# Patient Record
Sex: FEMALE | Race: WHITE | NOT HISPANIC OR LATINO | Employment: OTHER | URBAN - METROPOLITAN AREA
[De-identification: names, ages, dates, MRNs, and addresses within clinical notes are randomized per-mention and may not be internally consistent; named-entity substitution may affect disease eponyms.]

---

## 2018-05-14 ENCOUNTER — CONVERSION ENCOUNTER (OUTPATIENT)
Dept: MAMMOGRAPHY | Facility: HOSPITAL | Age: 68
End: 2018-05-14

## 2018-06-27 ENCOUNTER — OFFICE VISIT CONVERTED (OUTPATIENT)
Dept: GASTROENTEROLOGY | Facility: CLINIC | Age: 68
End: 2018-06-27
Attending: NURSE PRACTITIONER

## 2019-05-16 ENCOUNTER — OFFICE VISIT CONVERTED (OUTPATIENT)
Dept: ORTHOPEDIC SURGERY | Facility: CLINIC | Age: 69
End: 2019-05-16
Attending: ORTHOPAEDIC SURGERY

## 2019-06-18 ENCOUNTER — OFFICE VISIT CONVERTED (OUTPATIENT)
Dept: ORTHOPEDIC SURGERY | Facility: CLINIC | Age: 69
End: 2019-06-18
Attending: ORTHOPAEDIC SURGERY

## 2019-08-05 ENCOUNTER — HOSPITAL ENCOUNTER (OUTPATIENT)
Dept: PREADMISSION TESTING | Facility: HOSPITAL | Age: 69
Discharge: HOME | End: 2019-08-05
Attending: ORTHOPAEDIC SURGERY

## 2019-08-05 LAB
ALBUMIN SERPL-MCNC: 4.3 G/DL (ref 3.5–5)
ALBUMIN/GLOB SERPL: 1.5 {RATIO} (ref 1.4–2.6)
ALP SERPL-CCNC: 75 U/L (ref 43–160)
ALT SERPL-CCNC: 10 U/L (ref 10–40)
ANION GAP SERPL CALC-SCNC: 15 MMOL/L (ref 8–19)
APTT BLD: 23.4 S (ref 22.2–34.2)
AST SERPL-CCNC: 16 U/L (ref 15–50)
BASOPHILS # BLD AUTO: 0.07 10*3/UL (ref 0–0.2)
BASOPHILS NFR BLD AUTO: 0.9 % (ref 0–3)
BILIRUB SERPL-MCNC: 0.41 MG/DL (ref 0.2–1.3)
BUN SERPL-MCNC: 14 MG/DL (ref 5–25)
BUN/CREAT SERPL: 19 {RATIO} (ref 6–20)
CALCIUM SERPL-MCNC: 9.3 MG/DL (ref 8.7–10.4)
CHLORIDE SERPL-SCNC: 101 MMOL/L (ref 99–111)
CONV ABS IMM GRAN: 0.02 10*3/UL (ref 0–0.2)
CONV CO2: 28 MMOL/L (ref 22–32)
CONV IMMATURE GRAN: 0.3 % (ref 0–1.8)
CONV TOTAL PROTEIN: 7.1 G/DL (ref 6.3–8.2)
CREAT UR-MCNC: 0.73 MG/DL (ref 0.5–0.9)
DEPRECATED RDW RBC AUTO: 44.8 FL (ref 36.4–46.3)
EOSINOPHIL # BLD AUTO: 0.67 10*3/UL (ref 0–0.7)
EOSINOPHIL # BLD AUTO: 8.9 % (ref 0–7)
ERYTHROCYTE [DISTWIDTH] IN BLOOD BY AUTOMATED COUNT: 13.2 % (ref 11.7–14.4)
EST. AVERAGE GLUCOSE BLD GHB EST-MCNC: 103 MG/DL
GFR SERPLBLD BASED ON 1.73 SQ M-ARVRAT: >60 ML/MIN/{1.73_M2}
GLOBULIN UR ELPH-MCNC: 2.8 G/DL (ref 2–3.5)
GLUCOSE SERPL-MCNC: 115 MG/DL (ref 65–99)
HBA1C MFR BLD: 5.2 % (ref 3.5–5.7)
HCT VFR BLD AUTO: 38.5 % (ref 37–47)
HGB BLD-MCNC: 12.3 G/DL (ref 12–16)
INR PPP: 0.9 (ref 2–3)
LYMPHOCYTES # BLD AUTO: 1.42 10*3/UL (ref 1–5)
LYMPHOCYTES NFR BLD AUTO: 18.8 % (ref 20–45)
MCH RBC QN AUTO: 29.6 PG (ref 27–31)
MCHC RBC AUTO-ENTMCNC: 31.9 G/DL (ref 33–37)
MCV RBC AUTO: 92.5 FL (ref 81–99)
MONOCYTES # BLD AUTO: 0.73 10*3/UL (ref 0.2–1.2)
MONOCYTES NFR BLD AUTO: 9.7 % (ref 3–10)
NEUTROPHILS # BLD AUTO: 4.63 10*3/UL (ref 2–8)
NEUTROPHILS NFR BLD AUTO: 61.4 % (ref 30–85)
NRBC CBCN: 0 % (ref 0–0.7)
OSMOLALITY SERPL CALC.SUM OF ELEC: 291 MOSM/KG (ref 273–304)
PLATELET # BLD AUTO: 288 10*3/UL (ref 130–400)
PMV BLD AUTO: 9.5 FL (ref 9.4–12.3)
POTASSIUM SERPL-SCNC: 4.4 MMOL/L (ref 3.5–5.3)
PROTHROMBIN TIME: 9.6 S (ref 9.4–12)
RBC # BLD AUTO: 4.16 10*6/UL (ref 4.2–5.4)
SODIUM SERPL-SCNC: 140 MMOL/L (ref 135–147)
WBC # BLD AUTO: 7.54 10*3/UL (ref 4.8–10.8)

## 2019-09-24 ENCOUNTER — HOSPITAL ENCOUNTER (OUTPATIENT)
Dept: OTHER | Facility: HOSPITAL | Age: 69
Discharge: HOME | End: 2019-09-24
Attending: INTERNAL MEDICINE

## 2019-09-24 LAB
ALBUMIN SERPL-MCNC: 4 G/DL (ref 3.5–5)
ALBUMIN/GLOB SERPL: 1.4 {RATIO} (ref 1.4–2.6)
ALP SERPL-CCNC: 89 U/L (ref 43–160)
ALT SERPL-CCNC: 16 U/L (ref 10–40)
ANION GAP SERPL CALC-SCNC: 13 MMOL/L (ref 8–19)
AST SERPL-CCNC: 22 U/L (ref 15–50)
BASOPHILS # BLD AUTO: 0.06 10*3/UL (ref 0–0.2)
BASOPHILS NFR BLD AUTO: 1 % (ref 0–3)
BILIRUB SERPL-MCNC: 0.23 MG/DL (ref 0.2–1.3)
BUN SERPL-MCNC: 12 MG/DL (ref 5–25)
BUN/CREAT SERPL: 20 {RATIO} (ref 6–20)
CALCIUM SERPL-MCNC: 9.6 MG/DL (ref 8.7–10.4)
CHLORIDE SERPL-SCNC: 101 MMOL/L (ref 99–111)
CONV ABS IMM GRAN: 0.02 10*3/UL (ref 0–0.2)
CONV CO2: 27 MMOL/L (ref 22–32)
CONV IMMATURE GRAN: 0.3 % (ref 0–1.8)
CONV TOTAL PROTEIN: 6.8 G/DL (ref 6.3–8.2)
CREAT UR-MCNC: 0.61 MG/DL (ref 0.5–0.9)
DEPRECATED RDW RBC AUTO: 45.4 FL (ref 36.4–46.3)
EOSINOPHIL # BLD AUTO: 0.42 10*3/UL (ref 0–0.7)
EOSINOPHIL # BLD AUTO: 7.3 % (ref 0–7)
ERYTHROCYTE [DISTWIDTH] IN BLOOD BY AUTOMATED COUNT: 13.2 % (ref 11.7–14.4)
GFR SERPLBLD BASED ON 1.73 SQ M-ARVRAT: >60 ML/MIN/{1.73_M2}
GLOBULIN UR ELPH-MCNC: 2.8 G/DL (ref 2–3.5)
GLUCOSE SERPL-MCNC: 105 MG/DL (ref 65–99)
HCT VFR BLD AUTO: 34.5 % (ref 37–47)
HGB BLD-MCNC: 10.8 G/DL (ref 12–16)
LYMPHOCYTES # BLD AUTO: 1.03 10*3/UL (ref 1–5)
LYMPHOCYTES NFR BLD AUTO: 17.9 % (ref 20–45)
MCH RBC QN AUTO: 29.4 PG (ref 27–31)
MCHC RBC AUTO-ENTMCNC: 31.3 G/DL (ref 33–37)
MCV RBC AUTO: 94 FL (ref 81–99)
MONOCYTES # BLD AUTO: 0.62 10*3/UL (ref 0.2–1.2)
MONOCYTES NFR BLD AUTO: 10.7 % (ref 3–10)
NEUTROPHILS # BLD AUTO: 3.62 10*3/UL (ref 2–8)
NEUTROPHILS NFR BLD AUTO: 62.8 % (ref 30–85)
NRBC CBCN: 0 % (ref 0–0.7)
OSMOLALITY SERPL CALC.SUM OF ELEC: 284 MOSM/KG (ref 273–304)
PLATELET # BLD AUTO: 384 10*3/UL (ref 130–400)
PMV BLD AUTO: 9.7 FL (ref 9.4–12.3)
POTASSIUM SERPL-SCNC: 4.4 MMOL/L (ref 3.5–5.3)
RBC # BLD AUTO: 3.67 10*6/UL (ref 4.2–5.4)
SODIUM SERPL-SCNC: 137 MMOL/L (ref 135–147)
WBC # BLD AUTO: 5.77 10*3/UL (ref 4.8–10.8)

## 2019-09-26 ENCOUNTER — OFFICE VISIT CONVERTED (OUTPATIENT)
Dept: ORTHOPEDIC SURGERY | Facility: CLINIC | Age: 69
End: 2019-09-26

## 2019-10-24 ENCOUNTER — OFFICE VISIT CONVERTED (OUTPATIENT)
Dept: ORTHOPEDIC SURGERY | Facility: CLINIC | Age: 69
End: 2019-10-24

## 2019-11-21 ENCOUNTER — OFFICE VISIT CONVERTED (OUTPATIENT)
Dept: ORTHOPEDIC SURGERY | Facility: CLINIC | Age: 69
End: 2019-11-21
Attending: PHYSICIAN ASSISTANT

## 2020-01-07 ENCOUNTER — OFFICE VISIT CONVERTED (OUTPATIENT)
Dept: ORTHOPEDIC SURGERY | Facility: CLINIC | Age: 70
End: 2020-01-07
Attending: PHYSICIAN ASSISTANT

## 2020-02-18 ENCOUNTER — OFFICE VISIT CONVERTED (OUTPATIENT)
Dept: ORTHOPEDIC SURGERY | Facility: CLINIC | Age: 70
End: 2020-02-18
Attending: PHYSICIAN ASSISTANT

## 2020-11-21 ENCOUNTER — HOSPITAL ENCOUNTER (OUTPATIENT)
Dept: MAMMOGRAPHY | Facility: HOSPITAL | Age: 70
Discharge: HOME | End: 2020-11-21
Attending: INTERNAL MEDICINE

## 2021-01-25 ENCOUNTER — HOSPITAL ENCOUNTER (OUTPATIENT)
Dept: MAMMOGRAPHY | Facility: HOSPITAL | Age: 71
Discharge: HOME | End: 2021-01-25
Attending: INTERNAL MEDICINE

## 2021-05-11 ENCOUNTER — HOSPITAL ENCOUNTER (OUTPATIENT)
Dept: MRI IMAGING | Facility: HOSPITAL | Age: 71
Discharge: HOME | End: 2021-05-11
Attending: INTERNAL MEDICINE

## 2021-05-11 LAB
CREAT BLD-MCNC: 0.5 MG/DL (ref 0.6–1.4)
GFR SERPLBLD BASED ON 1.73 SQ M-ARVRAT: >60 ML/MIN/{1.73_M2}

## 2021-05-15 VITALS — HEIGHT: 63 IN | HEART RATE: 105 BPM | OXYGEN SATURATION: 94 %

## 2021-05-15 VITALS — OXYGEN SATURATION: 97 % | WEIGHT: 255 LBS | BODY MASS INDEX: 45.18 KG/M2 | HEIGHT: 63 IN | HEART RATE: 81 BPM

## 2021-05-15 VITALS — WEIGHT: 267 LBS | BODY MASS INDEX: 42.91 KG/M2 | OXYGEN SATURATION: 99 % | HEART RATE: 90 BPM | HEIGHT: 66 IN

## 2021-05-15 VITALS — WEIGHT: 255 LBS | OXYGEN SATURATION: 98 % | HEIGHT: 63 IN | BODY MASS INDEX: 45.18 KG/M2 | HEART RATE: 82 BPM

## 2021-05-15 VITALS — HEART RATE: 90 BPM | WEIGHT: 266 LBS | BODY MASS INDEX: 47.13 KG/M2 | HEIGHT: 63 IN | OXYGEN SATURATION: 97 %

## 2021-05-15 VITALS — HEIGHT: 63 IN | WEIGHT: 255 LBS | HEART RATE: 104 BPM | BODY MASS INDEX: 45.18 KG/M2 | OXYGEN SATURATION: 98 %

## 2021-05-15 VITALS — HEART RATE: 96 BPM | HEIGHT: 66 IN | OXYGEN SATURATION: 92 % | BODY MASS INDEX: 42.77 KG/M2 | WEIGHT: 266.12 LBS

## 2021-05-16 VITALS
WEIGHT: 269 LBS | HEART RATE: 103 BPM | TEMPERATURE: 97.8 F | SYSTOLIC BLOOD PRESSURE: 145 MMHG | HEIGHT: 66 IN | DIASTOLIC BLOOD PRESSURE: 75 MMHG | BODY MASS INDEX: 43.23 KG/M2

## 2022-03-15 ENCOUNTER — TRANSCRIBE ORDERS (OUTPATIENT)
Dept: ADMINISTRATIVE | Facility: HOSPITAL | Age: 72
End: 2022-03-15

## 2022-03-15 DIAGNOSIS — Z12.31 VISIT FOR SCREENING MAMMOGRAM: Primary | ICD-10-CM

## 2022-03-22 ENCOUNTER — APPOINTMENT (OUTPATIENT)
Dept: MAMMOGRAPHY | Facility: HOSPITAL | Age: 72
End: 2022-03-22

## 2022-11-01 ENCOUNTER — APPOINTMENT (OUTPATIENT)
Dept: MAMMOGRAPHY | Facility: HOSPITAL | Age: 72
End: 2022-11-01

## 2022-12-22 ENCOUNTER — APPOINTMENT (OUTPATIENT)
Dept: GENERAL RADIOLOGY | Facility: HOSPITAL | Age: 72
DRG: 184 | End: 2022-12-22
Payer: MEDICARE

## 2022-12-22 ENCOUNTER — HOSPITAL ENCOUNTER (INPATIENT)
Facility: HOSPITAL | Age: 72
LOS: 2 days | Discharge: NURSING, REHAB OR LONG-TERM CARE | DRG: 184 | End: 2022-12-27
Attending: EMERGENCY MEDICINE | Admitting: INTERNAL MEDICINE
Payer: MEDICARE

## 2022-12-22 ENCOUNTER — APPOINTMENT (OUTPATIENT)
Dept: CT IMAGING | Facility: HOSPITAL | Age: 72
DRG: 184 | End: 2022-12-22
Payer: MEDICARE

## 2022-12-22 ENCOUNTER — APPOINTMENT (OUTPATIENT)
Dept: GENERAL RADIOLOGY | Facility: HOSPITAL | Age: 72
End: 2022-12-22

## 2022-12-22 ENCOUNTER — HOSPITAL ENCOUNTER (EMERGENCY)
Facility: HOSPITAL | Age: 72
Discharge: HOME | End: 2022-12-22
Attending: EMERGENCY MEDICINE | Admitting: EMERGENCY MEDICINE

## 2022-12-22 ENCOUNTER — APPOINTMENT (OUTPATIENT)
Dept: CT IMAGING | Facility: HOSPITAL | Age: 72
End: 2022-12-22

## 2022-12-22 VITALS
RESPIRATION RATE: 24 BRPM | TEMPERATURE: 98.6 F | HEIGHT: 65 IN | DIASTOLIC BLOOD PRESSURE: 79 MMHG | OXYGEN SATURATION: 95 % | WEIGHT: 293 LBS | SYSTOLIC BLOOD PRESSURE: 166 MMHG | HEART RATE: 107 BPM | BODY MASS INDEX: 48.82 KG/M2

## 2022-12-22 DIAGNOSIS — S22.39XA CLOSED FRACTURE OF ONE RIB, UNSPECIFIED LATERALITY, INITIAL ENCOUNTER: ICD-10-CM

## 2022-12-22 DIAGNOSIS — S62.101A WRIST FRACTURE, CLOSED, RIGHT, INITIAL ENCOUNTER: ICD-10-CM

## 2022-12-22 DIAGNOSIS — Z78.9 DECREASED ACTIVITIES OF DAILY LIVING (ADL): ICD-10-CM

## 2022-12-22 DIAGNOSIS — S22.41XD CLOSED FRACTURE OF MULTIPLE RIBS OF RIGHT SIDE WITH ROUTINE HEALING, SUBSEQUENT ENCOUNTER: Primary | ICD-10-CM

## 2022-12-22 DIAGNOSIS — R26.2 DIFFICULTY WALKING: ICD-10-CM
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
## 2022-12-22 PROBLEM — R52 INTRACTABLE PAIN: Status: ACTIVE | Noted: 2022-12-22

## 2022-12-22 LAB
ALBUMIN SERPL-MCNC: 3.8 G/DL (ref 3.5–5.2)
ALBUMIN SERPL-MCNC: 3.9 G/DL (ref 3.5–5.2)
ALBUMIN/GLOB SERPL: 1.4 G/DL
ALBUMIN/GLOB SERPL: 1.4 G/DL
ALP SERPL-CCNC: 90 U/L (ref 39–117)
ALP SERPL-CCNC: 92 U/L (ref 39–117)
ALT SERPL W P-5'-P-CCNC: 11 U/L (ref 1–33)
ALT SERPL W P-5'-P-CCNC: 14 U/L (ref 1–33)
ANION GAP SERPL CALCULATED.3IONS-SCNC: 12.8 MMOL/L (ref 5–15)
ANION GAP SERPL CALCULATED.3IONS-SCNC: 13.7 MMOL/L (ref 5–15)
AST SERPL-CCNC: 20 U/L (ref 1–32)
AST SERPL-CCNC: 35 U/L (ref 1–32)
BACTERIA UR QL AUTO: ABNORMAL /HPF
BASOPHILS # BLD AUTO: 0.05 10*3/MM3 (ref 0–0.2)
BASOPHILS # BLD AUTO: 0.06 10*3/MM3 (ref 0–0.2)
BASOPHILS NFR BLD AUTO: 0.4 % (ref 0–1.5)
BASOPHILS NFR BLD AUTO: 0.7 % (ref 0–1.5)
BILIRUB SERPL-MCNC: 0.4 MG/DL (ref 0–1.2)
BILIRUB SERPL-MCNC: 0.7 MG/DL (ref 0–1.2)
BILIRUB UR QL STRIP: NEGATIVE
BUN SERPL-MCNC: 13 MG/DL (ref 8–23)
BUN SERPL-MCNC: 16 MG/DL (ref 8–23)
BUN/CREAT SERPL: 19.4 (ref 7–25)
BUN/CREAT SERPL: 22.5 (ref 7–25)
CALCIUM SPEC-SCNC: 9.5 MG/DL (ref 8.6–10.5)
CALCIUM SPEC-SCNC: 9.5 MG/DL (ref 8.6–10.5)
CHLORIDE SERPL-SCNC: 105 MMOL/L (ref 98–107)
CHLORIDE SERPL-SCNC: 99 MMOL/L (ref 98–107)
CLARITY UR: ABNORMAL
CO2 SERPL-SCNC: 24.3 MMOL/L (ref 22–29)
CO2 SERPL-SCNC: 26.2 MMOL/L (ref 22–29)
COD CRY URNS QL: ABNORMAL /HPF
COLOR UR: YELLOW
CREAT SERPL-MCNC: 0.67 MG/DL (ref 0.57–1)
CREAT SERPL-MCNC: 0.71 MG/DL (ref 0.57–1)
DEPRECATED RDW RBC AUTO: 45.7 FL (ref 37–54)
DEPRECATED RDW RBC AUTO: 45.8 FL (ref 37–54)
EGFRCR SERPLBLD CKD-EPI 2021: 90.5 ML/MIN/1.73
EGFRCR SERPLBLD CKD-EPI 2021: 93 ML/MIN/1.73
EOSINOPHIL # BLD AUTO: 0.02 10*3/MM3 (ref 0–0.4)
EOSINOPHIL # BLD AUTO: 0.18 10*3/MM3 (ref 0–0.4)
EOSINOPHIL NFR BLD AUTO: 0.2 % (ref 0.3–6.2)
EOSINOPHIL NFR BLD AUTO: 2.1 % (ref 0.3–6.2)
ERYTHROCYTE [DISTWIDTH] IN BLOOD BY AUTOMATED COUNT: 13.4 % (ref 12.3–15.4)
ERYTHROCYTE [DISTWIDTH] IN BLOOD BY AUTOMATED COUNT: 13.7 % (ref 12.3–15.4)
GLOBULIN UR ELPH-MCNC: 2.7 GM/DL
GLOBULIN UR ELPH-MCNC: 2.7 GM/DL
GLUCOSE SERPL-MCNC: 109 MG/DL (ref 65–99)
GLUCOSE SERPL-MCNC: 111 MG/DL (ref 65–99)
GLUCOSE UR STRIP-MCNC: NEGATIVE MG/DL
HCT VFR BLD AUTO: 35 % (ref 34–46.6)
HCT VFR BLD AUTO: 37.2 % (ref 34–46.6)
HGB BLD-MCNC: 11.2 G/DL (ref 12–15.9)
HGB BLD-MCNC: 11.7 G/DL (ref 12–15.9)
HGB UR QL STRIP.AUTO: ABNORMAL
HOLD SPECIMEN: NORMAL
HOLD SPECIMEN: NORMAL
HYALINE CASTS UR QL AUTO: ABNORMAL /LPF
IMM GRANULOCYTES # BLD AUTO: 0.04 10*3/MM3 (ref 0–0.05)
IMM GRANULOCYTES # BLD AUTO: 0.05 10*3/MM3 (ref 0–0.05)
IMM GRANULOCYTES NFR BLD AUTO: 0.4 % (ref 0–0.5)
IMM GRANULOCYTES NFR BLD AUTO: 0.5 % (ref 0–0.5)
KETONES UR QL STRIP: ABNORMAL
LEUKOCYTE ESTERASE UR QL STRIP.AUTO: ABNORMAL
LYMPHOCYTES # BLD AUTO: 0.62 10*3/MM3 (ref 0.7–3.1)
LYMPHOCYTES # BLD AUTO: 0.88 10*3/MM3 (ref 0.7–3.1)
LYMPHOCYTES NFR BLD AUTO: 10.5 % (ref 19.6–45.3)
LYMPHOCYTES NFR BLD AUTO: 4.7 % (ref 19.6–45.3)
MAGNESIUM SERPL-MCNC: 1.8 MG/DL (ref 1.6–2.4)
MCH RBC QN AUTO: 28.9 PG (ref 26.6–33)
MCH RBC QN AUTO: 29 PG (ref 26.6–33)
MCHC RBC AUTO-ENTMCNC: 31.5 G/DL (ref 31.5–35.7)
MCHC RBC AUTO-ENTMCNC: 32 G/DL (ref 31.5–35.7)
MCV RBC AUTO: 90.2 FL (ref 79–97)
MCV RBC AUTO: 92.1 FL (ref 79–97)
MONOCYTES # BLD AUTO: 0.93 10*3/MM3 (ref 0.1–0.9)
MONOCYTES # BLD AUTO: 0.97 10*3/MM3 (ref 0.1–0.9)
MONOCYTES NFR BLD AUTO: 11.5 % (ref 5–12)
MONOCYTES NFR BLD AUTO: 7 % (ref 5–12)
NEUTROPHILS NFR BLD AUTO: 11.58 10*3/MM3 (ref 1.7–7)
NEUTROPHILS NFR BLD AUTO: 6.28 10*3/MM3 (ref 1.7–7)
NEUTROPHILS NFR BLD AUTO: 74.7 % (ref 42.7–76)
NEUTROPHILS NFR BLD AUTO: 87.3 % (ref 42.7–76)
NITRITE UR QL STRIP: NEGATIVE
NRBC BLD AUTO-RTO: 0 /100 WBC (ref 0–0.2)
NRBC BLD AUTO-RTO: 0 /100 WBC (ref 0–0.2)
PH UR STRIP.AUTO: 5.5 [PH] (ref 5–8)
PLATELET # BLD AUTO: 350 10*3/MM3 (ref 140–450)
PLATELET # BLD AUTO: 356 10*3/MM3 (ref 140–450)
PMV BLD AUTO: 9 FL (ref 6–12)
PMV BLD AUTO: 9.3 FL (ref 6–12)
POTASSIUM SERPL-SCNC: 4 MMOL/L (ref 3.5–5.2)
POTASSIUM SERPL-SCNC: 4.4 MMOL/L (ref 3.5–5.2)
PROT SERPL-MCNC: 6.5 G/DL (ref 6–8.5)
PROT SERPL-MCNC: 6.6 G/DL (ref 6–8.5)
PROT UR QL STRIP: NEGATIVE
RBC # BLD AUTO: 3.88 10*6/MM3 (ref 3.77–5.28)
RBC # BLD AUTO: 4.04 10*6/MM3 (ref 3.77–5.28)
RBC # UR STRIP: ABNORMAL /HPF
REF LAB TEST METHOD: ABNORMAL
RENAL EPI CELLS #/AREA URNS HPF: ABNORMAL /HPF
SODIUM SERPL-SCNC: 137 MMOL/L (ref 136–145)
SODIUM SERPL-SCNC: 144 MMOL/L (ref 136–145)
SP GR UR STRIP: 1.02 (ref 1–1.03)
SQUAMOUS #/AREA URNS HPF: ABNORMAL /HPF
TROPONIN T SERPL-MCNC: <0.01 NG/ML (ref 0–0.03)
TROPONIN T SERPL-MCNC: <0.01 NG/ML (ref 0–0.03)
URATE CRY URNS QL MICRO: ABNORMAL /HPF
UROBILINOGEN UR QL STRIP: ABNORMAL
WBC # UR STRIP: ABNORMAL /HPF
WBC NRBC COR # BLD: 13.25 10*3/MM3 (ref 3.4–10.8)
WBC NRBC COR # BLD: 8.41 10*3/MM3 (ref 3.4–10.8)
WHOLE BLOOD HOLD COAG: NORMAL
WHOLE BLOOD HOLD SPECIMEN: NORMAL

## 2022-12-22 PROCEDURE — 93010 ELECTROCARDIOGRAM REPORT: CPT | Performed by: INTERNAL MEDICINE

## 2022-12-22 PROCEDURE — 99284 EMERGENCY DEPT VISIT MOD MDM: CPT

## 2022-12-22 PROCEDURE — 84484 ASSAY OF TROPONIN QUANT: CPT | Performed by: NURSE PRACTITIONER

## 2022-12-22 PROCEDURE — 36415 COLL VENOUS BLD VENIPUNCTURE: CPT

## 2022-12-22 PROCEDURE — 73502 X-RAY EXAM HIP UNI 2-3 VIEWS: CPT

## 2022-12-22 PROCEDURE — 81001 URINALYSIS AUTO W/SCOPE: CPT | Performed by: NURSE PRACTITIONER

## 2022-12-22 PROCEDURE — G0378 HOSPITAL OBSERVATION PER HR: HCPCS

## 2022-12-22 PROCEDURE — 25010000002 HYDROMORPHONE 1 MG/ML SOLUTION: Performed by: EMERGENCY MEDICINE

## 2022-12-22 PROCEDURE — 73110 X-RAY EXAM OF WRIST: CPT

## 2022-12-22 PROCEDURE — 71101 X-RAY EXAM UNILAT RIBS/CHEST: CPT

## 2022-12-22 PROCEDURE — 93005 ELECTROCARDIOGRAM TRACING: CPT | Performed by: EMERGENCY MEDICINE

## 2022-12-22 PROCEDURE — 85025 COMPLETE CBC W/AUTO DIFF WBC: CPT

## 2022-12-22 PROCEDURE — 93005 ELECTROCARDIOGRAM TRACING: CPT | Performed by: NURSE PRACTITIONER

## 2022-12-22 PROCEDURE — 0 IOPAMIDOL PER 1 ML: Performed by: EMERGENCY MEDICINE

## 2022-12-22 PROCEDURE — 25010000002 ENOXAPARIN PER 10 MG: Performed by: INTERNAL MEDICINE

## 2022-12-22 PROCEDURE — 80053 COMPREHEN METABOLIC PANEL: CPT | Performed by: NURSE PRACTITIONER

## 2022-12-22 PROCEDURE — 80053 COMPREHEN METABOLIC PANEL: CPT | Performed by: EMERGENCY MEDICINE

## 2022-12-22 PROCEDURE — 85025 COMPLETE CBC W/AUTO DIFF WBC: CPT | Performed by: NURSE PRACTITIONER

## 2022-12-22 PROCEDURE — 93005 ELECTROCARDIOGRAM TRACING: CPT

## 2022-12-22 PROCEDURE — 71260 CT THORAX DX C+: CPT

## 2022-12-22 PROCEDURE — 84484 ASSAY OF TROPONIN QUANT: CPT | Performed by: EMERGENCY MEDICINE

## 2022-12-22 PROCEDURE — 25010000002 KETOROLAC TROMETHAMINE PER 15 MG: Performed by: EMERGENCY MEDICINE

## 2022-12-22 PROCEDURE — 83735 ASSAY OF MAGNESIUM: CPT | Performed by: EMERGENCY MEDICINE

## 2022-12-22 PROCEDURE — 96372 THER/PROPH/DIAG INJ SC/IM: CPT

## 2022-12-22 PROCEDURE — 70450 CT HEAD/BRAIN W/O DYE: CPT

## 2022-12-22 RX ORDER — AMITRIPTYLINE HYDROCHLORIDE 50 MG/1
50 TABLET, FILM COATED ORAL NIGHTLY
COMMUNITY

## 2022-12-22 RX ORDER — KETOROLAC TROMETHAMINE 30 MG/ML
30 INJECTION, SOLUTION INTRAMUSCULAR; INTRAVENOUS ONCE
Status: COMPLETED | OUTPATIENT
Start: 2022-12-22 | End: 2022-12-22

## 2022-12-22 RX ORDER — KETOROLAC TROMETHAMINE 30 MG/ML
30 INJECTION, SOLUTION INTRAMUSCULAR; INTRAVENOUS EVERY 6 HOURS PRN
Status: DISCONTINUED | OUTPATIENT
Start: 2022-12-22 | End: 2022-12-27 | Stop reason: HOSPADM

## 2022-12-22 RX ORDER — MONTELUKAST SODIUM 10 MG/1
10 TABLET ORAL NIGHTLY
COMMUNITY

## 2022-12-22 RX ORDER — HYDROCODONE BITARTRATE AND ACETAMINOPHEN 5; 325 MG/1; MG/1
1 TABLET ORAL ONCE
Status: DISCONTINUED | OUTPATIENT
Start: 2022-12-22 | End: 2022-12-26

## 2022-12-22 RX ORDER — ALBUTEROL SULFATE 2.5 MG/3ML
2.5 SOLUTION RESPIRATORY (INHALATION) EVERY 4 HOURS PRN
Status: ON HOLD | COMMUNITY
End: 2022-12-23

## 2022-12-22 RX ORDER — ATORVASTATIN CALCIUM 20 MG/1
20 TABLET, FILM COATED ORAL DAILY
COMMUNITY

## 2022-12-22 RX ORDER — KETOROLAC TROMETHAMINE 10 MG/1
10 TABLET, FILM COATED ORAL ONCE
Status: COMPLETED | OUTPATIENT
Start: 2022-12-22 | End: 2022-12-22

## 2022-12-22 RX ORDER — HYDROCODONE BITARTRATE AND ACETAMINOPHEN 7.5; 325 MG/1; MG/1
1 TABLET ORAL ONCE
Status: COMPLETED | OUTPATIENT
Start: 2022-12-22 | End: 2022-12-22

## 2022-12-22 RX ORDER — SODIUM CHLORIDE 0.9 % (FLUSH) 0.9 %
10 SYRINGE (ML) INJECTION AS NEEDED
Status: DISCONTINUED | OUTPATIENT
Start: 2022-12-22 | End: 2022-12-22 | Stop reason: HOSPADM

## 2022-12-22 RX ORDER — ERGOCALCIFEROL 1.25 MG/1
50000 CAPSULE ORAL 2 TIMES WEEKLY
COMMUNITY

## 2022-12-22 RX ORDER — HYDROCODONE BITARTRATE AND ACETAMINOPHEN 7.5; 325 MG/1; MG/1
1 TABLET ORAL EVERY 4 HOURS PRN
Qty: 16 TABLET | Refills: 0 | Status: SHIPPED | OUTPATIENT
Start: 2022-12-22 | End: 2022-12-27 | Stop reason: HOSPADM

## 2022-12-22 RX ORDER — HYDROCODONE BITARTRATE AND ACETAMINOPHEN 5; 325 MG/1; MG/1
1 TABLET ORAL EVERY 6 HOURS PRN
Status: DISCONTINUED | OUTPATIENT
Start: 2022-12-22 | End: 2022-12-22

## 2022-12-22 RX ORDER — ENOXAPARIN SODIUM 100 MG/ML
40 INJECTION SUBCUTANEOUS EVERY 12 HOURS SCHEDULED
Status: DISCONTINUED | OUTPATIENT
Start: 2022-12-22 | End: 2022-12-23

## 2022-12-22 RX ADMIN — HYDROMORPHONE HYDROCHLORIDE 1 MG: 1 INJECTION, SOLUTION INTRAMUSCULAR; INTRAVENOUS; SUBCUTANEOUS at 20:04

## 2022-12-22 RX ADMIN — HYDROCODONE BITARTRATE AND ACETAMINOPHEN 1 TABLET: 7.5; 325 TABLET ORAL at 09:57

## 2022-12-22 RX ADMIN — HYDROCODONE BITARTRATE AND ACETAMINOPHEN 1 TABLET: 5; 325 TABLET ORAL at 17:04

## 2022-12-22 RX ADMIN — IOPAMIDOL 100 ML: 755 INJECTION, SOLUTION INTRAVENOUS at 19:35

## 2022-12-22 RX ADMIN — ENOXAPARIN SODIUM 40 MG: 100 INJECTION SUBCUTANEOUS at 23:02

## 2022-12-22 RX ADMIN — KETOROLAC TROMETHAMINE 10 MG: 10 TABLET, FILM COATED ORAL at 17:13

## 2022-12-22 RX ADMIN — HYDROCODONE BITARTRATE AND ACETAMINOPHEN 1 TABLET: 7.5; 325 TABLET ORAL at 03:09

## 2022-12-22 RX ADMIN — KETOROLAC TROMETHAMINE 30 MG: 30 INJECTION, SOLUTION INTRAMUSCULAR; INTRAVENOUS at 03:09

## 2022-12-22 NOTE — ED PROVIDER NOTES
Time: 2:35 AM EST  Chief Complaint:   Chief Complaint   Patient presents with   • Wrist Injury   • Back Pain           History of Present Illness:        Patient is a 72 y.o. year old female who presents to the emergency department with right wrist pain after a fall. Patient states that she was putting on a top when she lost her balance and fell to the floor. She denies losing consciousness but does does not recall hitting her head or bending her glasses. She states that she could not get up for quite some time. Patient also reports that 2 weeks ago she had an episode of chest pain that radiated to her neck. She said it went away and she never was seen for this but thinks that this may be related to today's episode. Pt notes she has chronic pain from fibromyalgia and arthritis. Pt normally takes tylenol for the pain. Pt notes she has been unable to stand since the fall.       History provided by:  Patient   used: No            Patient Care Team  Primary Care Provider: Christopher Thao MD    Past Medical History:     Allergies   Allergen Reactions   • Morphine Anaphylaxis     Past Medical History:   Diagnosis Date   • AMD (age related macular degeneration)    • Arthritis    • Asthma    • COPD (chronic obstructive pulmonary disease) (HCC)    • Fibromyalgia    • Hyperlipidemia    • Hypertension    • Polymyalgia arteritica (HCC)    • Sciatica      Past Surgical History:   Procedure Laterality Date   • REPLACEMENT TOTAL KNEE     • TONSILLECTOMY       History reviewed. No pertinent family history.    Home Medications:  Prior to Admission medications    Not on File        Social History:   Social History     Tobacco Use   • Smoking status: Never   • Smokeless tobacco: Never   Substance Use Topics   • Alcohol use: Never   • Drug use: Never         Review of Systems:  Review of Systems   Constitutional: Negative for chills and fever.   HENT: Negative for congestion, rhinorrhea and sore throat.    Eyes:  "Negative for pain and visual disturbance.   Respiratory: Negative for apnea, cough, chest tightness and shortness of breath.    Cardiovascular: Negative for chest pain and palpitations.   Gastrointestinal: Negative for abdominal pain, diarrhea, nausea and vomiting.   Genitourinary: Negative for difficulty urinating and dysuria.   Musculoskeletal: Positive for arthralgias and back pain. Negative for joint swelling and myalgias.        Right wrist pain   Skin: Negative for color change.   Neurological: Positive for syncope. Negative for seizures and headaches.   Psychiatric/Behavioral: Negative.    All other systems reviewed and are negative.       Physical Exam:  /79 (BP Location: Right arm, Patient Position: Sitting)   Pulse 107   Temp 98.6 °F (37 °C) (Oral)   Resp 24   Ht 165.1 cm (65\")   Wt (!) 138 kg (305 lb)   SpO2 95%   BMI 50.75 kg/m²     Physical Exam  Vitals and nursing note reviewed.   Constitutional:       General: She is not in acute distress.     Appearance: Normal appearance. She is not toxic-appearing.      Comments: In pain   HENT:      Head: Normocephalic and atraumatic.      Jaw: There is normal jaw occlusion.   Eyes:      General: Lids are normal.      Extraocular Movements: Extraocular movements intact.      Conjunctiva/sclera: Conjunctivae normal.      Pupils: Pupils are equal, round, and reactive to light.   Cardiovascular:      Rate and Rhythm: Normal rate and regular rhythm.      Pulses: Normal pulses.      Heart sounds: Normal heart sounds.   Pulmonary:      Effort: Pulmonary effort is normal. No respiratory distress.      Breath sounds: Normal breath sounds. No wheezing or rhonchi.   Abdominal:      General: Abdomen is flat.      Palpations: Abdomen is soft.      Tenderness: There is no abdominal tenderness. There is no guarding or rebound.   Musculoskeletal:      Right wrist: Bony tenderness present. Decreased range of motion.      Cervical back: Normal range of motion and neck " supple.      Right lower leg: No edema.      Left lower leg: No edema.   Skin:     General: Skin is warm and dry.      Findings: Abrasion (superficial and to the R forehead) present.   Neurological:      Mental Status: She is alert and oriented to person, place, and time. Mental status is at baseline.   Psychiatric:         Mood and Affect: Mood normal.                Medications in the Emergency Department:  Medications   sodium chloride 0.9 % flush 10 mL (has no administration in time range)   ketorolac (TORADOL) injection 30 mg (30 mg Intramuscular Given 12/22/22 0309)   HYDROcodone-acetaminophen (NORCO) 7.5-325 MG per tablet 1 tablet (1 tablet Oral Given 12/22/22 0309)        Labs  Lab Results (last 24 hours)     Procedure Component Value Units Date/Time    CBC & Differential [[id number]]  (Abnormal) Collected: 12/22/22 0042    Specimen: Blood Updated: 12/22/22 0112    Narrative:      The following orders were created for panel order CBC & Differential.  Procedure                               Abnormality         Status                     ---------                               -----------         ------                     CBC Auto Differential[[id number]]        Abnormal            Final result                 Please view results for these tests on the individual orders.    Comprehensive Metabolic Panel [[id number]]  (Abnormal) Collected: 12/22/22 0042    Specimen: Blood Updated: 12/22/22 0135     Glucose 111 mg/dL      BUN 13 mg/dL      Creatinine 0.67 mg/dL      Sodium 144 mmol/L      Potassium 4.4 mmol/L      Chloride 105 mmol/L      CO2 26.2 mmol/L      Calcium 9.5 mg/dL      Total Protein 6.5 g/dL      Albumin 3.80 g/dL      ALT (SGPT) 11 U/L      AST (SGOT) 20 U/L      Alkaline Phosphatase 92 U/L      Total Bilirubin 0.4 mg/dL      Globulin 2.7 gm/dL      A/G Ratio 1.4 g/dL      BUN/Creatinine Ratio 19.4     Anion Gap 12.8 mmol/L      eGFR 93.0 mL/min/1.73      Comment: National Kidney Foundation and  American Society of Nephrology (ASN) Task Force recommended calculation based on the Chronic Kidney Disease Epidemiology Collaboration (CKD-EPI) equation refit without adjustment for race.       Narrative:      GFR Normal >60  Chronic Kidney Disease <60  Kidney Failure <15    The GFR formula is only valid for adults with stable renal function between ages 18 and 70.    Magnesium [[id number]]  (Normal) Collected: 12/22/22 0042    Specimen: Blood Updated: 12/22/22 0135     Magnesium 1.8 mg/dL     Troponin [[id number]]  (Normal) Collected: 12/22/22 0042    Specimen: Blood Updated: 12/22/22 0133     Troponin T <0.010 ng/mL     Narrative:      Troponin T Reference Range:  <= 0.03 ng/mL-   Negative for AMI  >0.03 ng/mL-     Abnormal for myocardial necrosis.  Clinicians would have to utilize clinical acumen, EKG, Troponin and serial changes to determine if it is an Acute Myocardial Infarction or myocardial injury due to an underlying chronic condition.       Results may be falsely decreased if patient taking Biotin.      CBC Auto Differential [[id number]]  (Abnormal) Collected: 12/22/22 0042    Specimen: Blood Updated: 12/22/22 0112     WBC 13.25 10*3/mm3      RBC 4.04 10*6/mm3      Hemoglobin 11.7 g/dL      Hematocrit 37.2 %      MCV 92.1 fL      MCH 29.0 pg      MCHC 31.5 g/dL      RDW 13.4 %      RDW-SD 45.8 fl      MPV 9.0 fL      Platelets 350 10*3/mm3      Neutrophil % 87.3 %      Lymphocyte % 4.7 %      Monocyte % 7.0 %      Eosinophil % 0.2 %      Basophil % 0.4 %      Immature Grans % 0.4 %      Neutrophils, Absolute 11.58 10*3/mm3      Lymphocytes, Absolute 0.62 10*3/mm3      Monocytes, Absolute 0.93 10*3/mm3      Eosinophils, Absolute 0.02 10*3/mm3      Basophils, Absolute 0.05 10*3/mm3      Immature Grans, Absolute 0.05 10*3/mm3      nRBC 0.0 /100 WBC            Imaging:  XR Wrist 3+ View Right    Result Date: 12/22/2022  PROCEDURE: XR WRIST 3+ VW RIGHT  COMPARISON: None.  INDICATIONS: 72-year-old female who fell;  right wrist trauma/injury/pain.  FINDINGS: Three views were obtained.  There is an acute comminuted intra-articular closed impacted slightly displaced distal right radial fracture, which is likely a Frykman type 4 (IV) fracture.  There is also an acute nondisplaced fracture of the right ulnar styloid process.  Acute soft tissue contusion is centered about the distal right forearm and the right wrist.  No dislocation is seen.  No other acute fractures are identified.  Mild degenerative changes involve the imaged portions of the right hand and the right wrist.  There is suspected generalized osteopenia.       Acute fractures involve the distal right radius and right ulna, as discussed.  No dislocation.     Please note that portions of this note were completed with a voice recognition program.  KRYSTEN CLEMENTE JR, MD       Electronically Signed and Approved By: KRYSTEN CLEMENTE JR, MD on 12/22/2022 at 2:02              CT Head Without Contrast    Result Date: 12/22/2022  PROCEDURE: CT HEAD WO CONTRAST  COMPARISON: None.  INDICATIONS: 72-year-old female who fell; head injury/trauma; AMS (altered mental status); right wrist fractures.  PROTOCOL:   Standard CT imaging protocol performed.    RADIATION:   Total DLP: 1,082.2 mGy*cm.   MA and/or KV were/was adjusted to minimize radiation dose.    TECHNIQUE: After obtaining the patient's consent, 138 CT images were obtained without non-ionic intravenous contrast material.  There is slight motion artifact on the study.  Asymmetry in positioning of the patient's head in the scan field of view (FOV) is noted.  The best possible images were obtained, considering the patient's condition.  DISCUSSION:  A routine nonenhanced head CT was performed. No acute brain abnormality is identified. No acute intracranial hemorrhage. No acute infarction. No acute skull fracture. No midline shift or acute intracranial mass effect is seen.  Minimal chronic small vessel ischemia/infarction is  suspected. There are arterial calcifications. The extra-axial spaces and the ventricular system are prominent.  Again, there is slight motion artifact on the study.  The patient has undergone bilateral cataract extractions with intra-ocular lens implants.  Mild age-indeterminate mucosal thickening involves the imaged paranasal sinuses.  No air-fluid interfaces are seen within the imaged paranasal sinuses.  Benign external auditory canal debris is suspected, especially on the left.        No acute brain abnormality is seen.  No acute intracranial hemorrhage.  No acute skull fracture.   Please note that portions of this note were completed with a voice recognition program.  KRYSTEN CLEMENTE JR, MD       Electronically Signed and Approved By: KRYSTEN CLEMENTE JR, MD on 12/22/2022 at 3:02              XR Hip With or Without Pelvis 2 - 3 View Left    Result Date: 12/22/2022  PROCEDURE: XR HIP W OR WO PELVIS 2-3 VIEW LEFT  COMPARISON: [facility], CR, RIGHT HIP/PELVIS, 10/28/2018, 15:19.  INDICATIONS: FALL, LEFT HIP PAIN  FINDINGS:  Four (4) views were obtained.  The views are limited.  Grossly, no acute fracture or acute malalignment is identified.  Mild-to-moderate degenerative changes involve the bilateral hip joints.  Chronic posttraumatic changes involve the left pubic bone and the superior and inferior left ischiopubic rami, seen previously.  If symptoms or clinical concerns persist, consider imaging follow-up.        The views are limited.  Grossly, no acute fracture or acute malalignment is appreciated.     Please note that portions of this note were completed with a voice recognition program.  KRYSTEN CLEMENTE JR, MD       Electronically Signed and Approved By: KRYSTEN CLEMENTE JR, MD on 12/22/2022 at 4:28                Procedures:  Procedures    Progress  ED Course as of 12/22/22 0718   Thu Dec 22, 2022   0357 Potassium: 4.4 [JS]      ED Course User Index  [JS] Mehrdad Quigley MD                            The patient  was initially evaluated in the triage area where orders were placed. The patient was later dispositioned by Mehrdad Quigley MD.      The patient was advised to stay for completion of workup which includes but is not limited to communication of labs and radiological results, reassessment and plan. The patient was advised that leaving prior to disposition by a provider could result in critical findings that are not communicated to the patient.     Medical Decision Making:  MDM  Number of Diagnoses or Management Options  Fall, initial encounter: established and worsening  Wrist fracture, closed, right, initial encounter: new and requires workup  Diagnosis management comments: Patient's right wrist fracture was immobilized in a sugar-tong splint.  Patient was immobilized in a sling with improved comfort.  She will follow-up with orthopedics.  Prior to discharge patient began to complain of left groin pain.  X-ray shows no evidence of fracture or dislocation.  Patient is able to range her left hip without pain.  She is safe for discharge home.  We discussed return precautions including worsening symptoms or any additional concerns.    We discussed the patient's frequent falls we discussed the possibility of admission although she has no indication for admission other than concern for increased falls at home but the patient states she feels comfortable plan for discharge home and will contact her PCP regarding additional help at home and/or physical therapy.  We discussed return precautions including worsening symptoms or any additional concerns.       Amount and/or Complexity of Data Reviewed  Clinical lab tests: ordered and reviewed  Tests in the radiology section of CPT®: ordered and reviewed  Tests in the medicine section of CPT®: reviewed and ordered             The following orders were placed after triage and evaluation:  Orders Placed This Encounter   Procedures   • Splint Application   • CT Head Without Contrast    • XR Wrist 3+ View Right   • XR Hip With or Without Pelvis 2 - 3 View Left   • [place] Draw   • Comprehensive Metabolic Panel   • Magnesium   • Troponin   • CBC Auto Differential   • NPO Diet NPO Type: Strict NPO   • Undress & Gown   • Cardiac Monitoring   • Continuous Pulse Oximetry   • Vital Signs   • Orthostatic Blood Pressure   • Obtain & Apply The Following- Upper extremity; Sling   • Oxygen Therapy- Nasal Cannula; 2 LPM; Titrate for SPO2: equal to or greater than, 92%   • POC Glucose Once   • ECG 12 Lead ED Triage Standing Order; Syncope   • Insert Peripheral IV   • CBC & Differential   • Green Top (Gel)   • Lavender Top   • Gold Top - SST   • Light Blue Top       Final diagnoses:   Fall, initial encounter   Wrist fracture, closed, right, initial encounter          Disposition:  ED Disposition     ED Disposition   Discharge    Condition   Stable    Comment   --             This medical record created using voice recognition software.           Eduardo Burrell  12/22/22 0235       Eduardo Burrell  12/22/22 0236       Mehrdad Quigley MD  12/22/22 0782
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
## 2022-12-22 NOTE — ED PROVIDER NOTES
Time: 4:30 PM EST  Chief Complaint:   Chief Complaint   Patient presents with   • Rib Pain           History of Present Illness:  Patient is a 72 y.o. year old female who presents to the emergency department with was seen here this morning due to a fall had imaging of her pelvis, right wrist and head.  Was diagnosed with a wrist fracture.  Patient now has complaints of right rib pain worse with deep breathing and coughing.  Patient followed up with her PCP (Dr. Gilliam) today who told her to come to the ED for admission.      Pt was here this morning due to a fall last night. She injured her pelvis, Right wrist, and head. She had imaging and was diagnosed with a wrist fracture and sent back home around 9:00 AM.     She went to Dr. Thao's office after leaving the ED when she began to not be able to move or breathe well. She notes she began having Right-sided rib pain and began wheezing.     Dr. Thao recommended that she go back to the ED.       History provided by:  Patient   used: No    Injury  Mechanism of injury: fall    Injury location:  Torso, pelvis, hand and head/neck  Hand injury location:  R wrist  Torso injury location:  R chest  Incident location:  Home  Time since incident:  1 day  Arrived directly from scene: no    Prior to arrival data:     Breathing condition since incident:  Worsening  Associated symptoms: chest pain and difficulty breathing    Associated symptoms: no abdominal pain, no headaches, no nausea, no seizures and no vomiting            Patient Care Team  Primary Care Provider: Christopher Thao MD    Past Medical History:     Allergies   Allergen Reactions   • Morphine Anaphylaxis     Past Medical History:   Diagnosis Date   • AMD (age related macular degeneration)    • Arthritis    • Asthma    • COPD (chronic obstructive pulmonary disease) ([facility])    • Fibromyalgia    • Hyperlipidemia    • Hypertension    • Obesity    • Polymyalgia arteritica ([facility])    • Sciatica      Past  Surgical History:   Procedure Laterality Date   • REPLACEMENT TOTAL KNEE     • TONSILLECTOMY       History reviewed. No pertinent family history.    Home Medications:  Prior to Admission medications    Medication Sig Start Date End Date Taking? Authorizing Provider   albuterol (PROVENTIL) (2.5 MG/3ML) 0.083% nebulizer solution Take 2.5 mg by nebulization Every 4 (Four) Hours As Needed for Wheezing.    Ciara Mcgarry MD   amitriptyline (ELAVIL) 10 MG tablet Take 10 mg by mouth Every Night.    Ciara Mcgarry MD   atorvastatin (LIPITOR) 10 MG tablet Take 10 mg by mouth Daily.    Ciara Mcgarry MD   HYDROcodone-acetaminophen (NORCO) 7.5-325 MG per tablet Take 1 tablet by mouth Every 4 (Four) Hours As Needed for Moderate Pain. 12/22/22   Mehrdad Quigley MD   montelukast (SINGULAIR) 10 MG tablet Take 10 mg by mouth Every Night.    Ciara Mcgarry MD        Social History:   Social History     Tobacco Use   • Smoking status: Never   • Smokeless tobacco: Never   Substance Use Topics   • Alcohol use: Never   • Drug use: Never         Review of Systems:  Review of Systems   Constitutional: Negative for chills and fever.   HENT: Negative for congestion, rhinorrhea and sore throat.    Eyes: Negative for pain and visual disturbance.   Respiratory: Positive for shortness of breath. Negative for apnea, cough and chest tightness.    Cardiovascular: Positive for chest pain. Negative for palpitations.   Gastrointestinal: Negative for abdominal pain, diarrhea, nausea and vomiting.   Genitourinary: Negative for difficulty urinating and dysuria.   Musculoskeletal: Negative for joint swelling and myalgias.   Skin: Negative for color change.   Neurological: Negative for seizures and headaches.   Psychiatric/Behavioral: Negative.    All other systems reviewed and are negative.       Physical Exam:  /77   Pulse 97   Temp 98.4 °F (36.9 °C) (Oral)   Resp 20   Ht 167.6 cm (66\")   Wt (!) 138 kg (304 lb 3.8  oz)   SpO2 97%   BMI 49.10 kg/m²     Physical Exam  Vitals and nursing note reviewed.   Constitutional:       General: She is not in acute distress.     Appearance: Normal appearance. She is not toxic-appearing.   HENT:      Head: Normocephalic and atraumatic.      Jaw: There is normal jaw occlusion.   Eyes:      General: Lids are normal.      Extraocular Movements: Extraocular movements intact.      Conjunctiva/sclera: Conjunctivae normal.      Pupils: Pupils are equal, round, and reactive to light.   Cardiovascular:      Rate and Rhythm: Normal rate and regular rhythm.      Pulses: Normal pulses.      Heart sounds: Normal heart sounds.   Pulmonary:      Effort: Pulmonary effort is normal. No respiratory distress.      Breath sounds: Normal breath sounds. No wheezing or rhonchi.   Chest:      Chest wall: Tenderness present.      Comments: Right chest wall tenderness.   Abdominal:      General: Abdomen is flat.      Palpations: Abdomen is soft.      Tenderness: There is no abdominal tenderness. There is no guarding or rebound.   Musculoskeletal:         General: Normal range of motion.      Cervical back: Normal range of motion and neck supple.      Right lower le+ Edema present.      Left lower le+ Edema present.      Comments: Right upper extremity is in a splint.   Skin:     General: Skin is warm and dry.   Neurological:      Mental Status: She is alert and oriented to person, place, and time. Mental status is at baseline.   Psychiatric:         Mood and Affect: Mood normal.                Medications in the Emergency Department:  Medications   HYDROcodone-acetaminophen (NORCO) 5-325 MG per tablet 1 tablet ( Oral Canceled Entry 22)   ketorolac (TORADOL) tablet 10 mg (10 mg Oral Given 22 1713)   HYDROmorphone (DILAUDID) injection 1 mg (1 mg Intravenous Given 22)   iopamidol (ISOVUE-370) 76 % injection 100 mL (100 mL Intravenous Given 22)        Labs  Lab Results  (last 24 hours)     Procedure Component Value Units Date/Time    CBC & Differential [[id number]]  (Abnormal) Collected: 12/22/22 0042    Specimen: Blood Updated: 12/22/22 0112    Narrative:      The following orders were created for panel order CBC & Differential.  Procedure                               Abnormality         Status                     ---------                               -----------         ------                     CBC Auto Differential[[id number]]        Abnormal            Final result                 Please view results for these tests on the individual orders.    Comprehensive Metabolic Panel [[id number]]  (Abnormal) Collected: 12/22/22 0042    Specimen: Blood Updated: 12/22/22 0135     Glucose 111 mg/dL      BUN 13 mg/dL      Creatinine 0.67 mg/dL      Sodium 144 mmol/L      Potassium 4.4 mmol/L      Chloride 105 mmol/L      CO2 26.2 mmol/L      Calcium 9.5 mg/dL      Total Protein 6.5 g/dL      Albumin 3.80 g/dL      ALT (SGPT) 11 U/L      AST (SGOT) 20 U/L      Alkaline Phosphatase 92 U/L      Total Bilirubin 0.4 mg/dL      Globulin 2.7 gm/dL      A/G Ratio 1.4 g/dL      BUN/Creatinine Ratio 19.4     Anion Gap 12.8 mmol/L      eGFR 93.0 mL/min/1.73      Comment: National Kidney Foundation and American Society of Nephrology (ASN) Task Force recommended calculation based on the Chronic Kidney Disease Epidemiology Collaboration (CKD-EPI) equation refit without adjustment for race.       Narrative:      GFR Normal >60  Chronic Kidney Disease <60  Kidney Failure <15    The GFR formula is only valid for adults with stable renal function between ages 18 and 70.    Magnesium [[id number]]  (Normal) Collected: 12/22/22 0042    Specimen: Blood Updated: 12/22/22 0135     Magnesium 1.8 mg/dL     Troponin [[id number]]  (Normal) Collected: 12/22/22 0042    Specimen: Blood Updated: 12/22/22 0133     Troponin T <0.010 ng/mL     Narrative:      Troponin T Reference Range:  <= 0.03 ng/mL-   Negative for  AMI  >0.03 ng/mL-     Abnormal for myocardial necrosis.  Clinicians would have to utilize clinical acumen, EKG, Troponin and serial changes to determine if it is an Acute Myocardial Infarction or myocardial injury due to an underlying chronic condition.       Results may be falsely decreased if patient taking Biotin.      CBC Auto Differential [[id number]]  (Abnormal) Collected: 12/22/22 0042    Specimen: Blood Updated: 12/22/22 0112     WBC 13.25 10*3/mm3      RBC 4.04 10*6/mm3      Hemoglobin 11.7 g/dL      Hematocrit 37.2 %      MCV 92.1 fL      MCH 29.0 pg      MCHC 31.5 g/dL      RDW 13.4 %      RDW-SD 45.8 fl      MPV 9.0 fL      Platelets 350 10*3/mm3      Neutrophil % 87.3 %      Lymphocyte % 4.7 %      Monocyte % 7.0 %      Eosinophil % 0.2 %      Basophil % 0.4 %      Immature Grans % 0.4 %      Neutrophils, Absolute 11.58 10*3/mm3      Lymphocytes, Absolute 0.62 10*3/mm3      Monocytes, Absolute 0.93 10*3/mm3      Eosinophils, Absolute 0.02 10*3/mm3      Basophils, Absolute 0.05 10*3/mm3      Immature Grans, Absolute 0.05 10*3/mm3      nRBC 0.0 /100 WBC     CBC & Differential [[id number]]  (Abnormal) Collected: 12/22/22 1713    Specimen: Blood Updated: 12/22/22 1728    Narrative:      The following orders were created for panel order CBC & Differential.  Procedure                               Abnormality         Status                     ---------                               -----------         ------                     CBC Auto Differential[[id number]]        Abnormal            Final result                 Please view results for these tests on the individual orders.    Comprehensive Metabolic Panel [[id number]]  (Abnormal) Collected: 12/22/22 1713    Specimen: Blood Updated: 12/22/22 1753     Glucose 109 mg/dL      BUN 16 mg/dL      Creatinine 0.71 mg/dL      Sodium 137 mmol/L      Potassium 4.0 mmol/L      Comment: Slight hemolysis detected by analyzer. Results may be affected.        Chloride 99  mmol/L      CO2 24.3 mmol/L      Calcium 9.5 mg/dL      Total Protein 6.6 g/dL      Albumin 3.90 g/dL      ALT (SGPT) 14 U/L      AST (SGOT) 35 U/L      Alkaline Phosphatase 90 U/L      Total Bilirubin 0.7 mg/dL      Globulin 2.7 gm/dL      A/G Ratio 1.4 g/dL      BUN/Creatinine Ratio 22.5     Anion Gap 13.7 mmol/L      eGFR 90.5 mL/min/1.73      Comment: National Kidney Foundation and American Society of Nephrology (ASN) Task Force recommended calculation based on the Chronic Kidney Disease Epidemiology Collaboration (CKD-EPI) equation refit without adjustment for race.       Narrative:      GFR Normal >60  Chronic Kidney Disease <60  Kidney Failure <15    The GFR formula is only valid for adults with stable renal function between ages 18 and 70.    Troponin [[id number]]  (Normal) Collected: 12/22/22 1713    Specimen: Blood Updated: 12/22/22 1759     Troponin T <0.010 ng/mL     Narrative:      Troponin T Reference Range:  <= 0.03 ng/mL-   Negative for AMI  >0.03 ng/mL-     Abnormal for myocardial necrosis.  Clinicians would have to utilize clinical acumen, EKG, Troponin and serial changes to determine if it is an Acute Myocardial Infarction or myocardial injury due to an underlying chronic condition.       Results may be falsely decreased if patient taking Biotin.      CBC Auto Differential [[id number]]  (Abnormal) Collected: 12/22/22 1713    Specimen: Blood Updated: 12/22/22 1728     WBC 8.41 10*3/mm3      RBC 3.88 10*6/mm3      Hemoglobin 11.2 g/dL      Hematocrit 35.0 %      MCV 90.2 fL      MCH 28.9 pg      MCHC 32.0 g/dL      RDW 13.7 %      RDW-SD 45.7 fl      MPV 9.3 fL      Platelets 356 10*3/mm3      Neutrophil % 74.7 %      Lymphocyte % 10.5 %      Monocyte % 11.5 %      Eosinophil % 2.1 %      Basophil % 0.7 %      Immature Grans % 0.5 %      Neutrophils, Absolute 6.28 10*3/mm3      Lymphocytes, Absolute 0.88 10*3/mm3      Monocytes, Absolute 0.97 10*3/mm3      Eosinophils, Absolute 0.18 10*3/mm3       Basophils, Absolute 0.06 10*3/mm3      Immature Grans, Absolute 0.04 10*3/mm3      nRBC 0.0 /100 WBC     Urinalysis With Microscopic If Indicated (No Culture) - Urine, Clean Catch [[id number]]  (Abnormal) Collected: 12/22/22 2017    Specimen: Urine, Clean Catch Updated: 12/22/22 2107     Color, UA Yellow     Appearance, UA Turbid     pH, UA 5.5     Specific Gravity, UA 1.019     Glucose, UA Negative     Ketones, UA 15 mg/dL (1+)     Bilirubin, UA Negative     Blood, UA Moderate (2+)     Protein, UA Negative     Leuk Esterase, UA Moderate (2+)     Nitrite, UA Negative     Urobilinogen, UA 1.0 E.U./dL    Urinalysis, Microscopic Only - Urine, Clean Catch [[id number]]  (Abnormal) Collected: 12/22/22 2017    Specimen: Urine, Clean Catch Updated: 12/22/22 2117     RBC, UA 3-5 /HPF      WBC, UA 6-12 /HPF      Bacteria, UA 4+ /HPF      Squamous Epithelial Cells, UA 7-12 /HPF      Renal Epithelial Cells, UA 0-2 /HPF      Hyaline Casts, UA None Seen /LPF      Uric Acid Crystals, UA Small/1+ /HPF      Calcium Oxalate Crystals, UA Small/1+ /HPF      Methodology Manual Light Microscopy           Imaging:  XR Ribs Right With PA Chest    Result Date: 12/22/2022  PROCEDURE: XR RIBS RIGHT W PA CHEST  COMPARISON: [facility], , CHEST AP/PA 1 VIEW, 8/30/2019, 15:02.  INDICATIONS: fall right rib pain.  FINDINGS:  Lungs normally expanded.  Heart size is enlarged.  No pneumothorax or pleural effusion.  No focal pulmonary parenchymal opacity.  Osteopenia.  Port tip terminates at the brachiocephalic SVC junction.  Scoliosis thoracic spine convex rightward.  There is a mildly displaced fracture of the lateral right 7th rib.        1. No acute cardiopulmonary abnormality. 2. Mildly displaced fracture of the right lateral 7th rib.     MILAGROS JACINTO MD       Electronically Signed and Approved By: MILAGROS JACINTO MD on 12/22/2022 at 18:07             XR Wrist 3+ View Right    Result Date: 12/22/2022  PROCEDURE: XR WRIST 3+ VW RIGHT   COMPARISON: None.  INDICATIONS: 72-year-old female who fell; right wrist trauma/injury/pain.  FINDINGS: Three views were obtained.  There is an acute comminuted intra-articular closed impacted slightly displaced distal right radial fracture, which is likely a Frykman type 4 (IV) fracture.  There is also an acute nondisplaced fracture of the right ulnar styloid process.  Acute soft tissue contusion is centered about the distal right forearm and the right wrist.  No dislocation is seen.  No other acute fractures are identified.  Mild degenerative changes involve the imaged portions of the right hand and the right wrist.  There is suspected generalized osteopenia.       Acute fractures involve the distal right radius and right ulna, as discussed.  No dislocation.     Please note that portions of this note were completed with a voice recognition program.  KRYSTEN CLEMENTE JR, MD       Electronically Signed and Approved By: KRYSTEN CLEMENTE JR, MD on 12/22/2022 at 2:02              CT Head Without Contrast    Result Date: 12/22/2022  PROCEDURE: CT HEAD WO CONTRAST  COMPARISON: None.  INDICATIONS: 72-year-old female who fell; head injury/trauma; AMS (altered mental status); right wrist fractures.  PROTOCOL:   Standard CT imaging protocol performed.    RADIATION:   Total DLP: 1,082.2 mGy*cm.   MA and/or KV were/was adjusted to minimize radiation dose.    TECHNIQUE: After obtaining the patient's consent, 138 CT images were obtained without non-ionic intravenous contrast material.  There is slight motion artifact on the study.  Asymmetry in positioning of the patient's head in the scan field of view (FOV) is noted.  The best possible images were obtained, considering the patient's condition.  DISCUSSION:  A routine nonenhanced head CT was performed. No acute brain abnormality is identified. No acute intracranial hemorrhage. No acute infarction. No acute skull fracture. No midline shift or acute intracranial mass effect is seen.   Minimal chronic small vessel ischemia/infarction is suspected. There are arterial calcifications. The extra-axial spaces and the ventricular system are prominent.  Again, there is slight motion artifact on the study.  The patient has undergone bilateral cataract extractions with intra-ocular lens implants.  Mild age-indeterminate mucosal thickening involves the imaged paranasal sinuses.  No air-fluid interfaces are seen within the imaged paranasal sinuses.  Benign external auditory canal debris is suspected, especially on the left.        No acute brain abnormality is seen.  No acute intracranial hemorrhage.  No acute skull fracture.   Please note that portions of this note were completed with a voice recognition program.  KRYSTEN CLEMENTE JR, MD       Electronically Signed and Approved By: KRYSTEN CLEMENTE JR, MD on 12/22/2022 at 3:02              CT Chest With Contrast Diagnostic    Result Date: 12/22/2022  PROCEDURE: CT CHEST W CONTRAST DIAGNOSTIC  COMPARISON:  None INDICATIONS: shortness of breath right rib pain.  Mildly displaced fracture right lateral 7th rib on radiograph.  TECHNIQUE: After obtaining the patient's consent, CT images were obtained with non-ionic intravenous contrast material.   PROTOCOL:   Pulmonary embolism imaging protocol performed    RADIATION:   DLP: 650.6mGy*cm   Automated exposure control was utilized to minimize radiation dose. CONTRAST: 100cc Isovue 370 I.V. LABS:   eGFR: >60ml/min/1.73m2  FINDINGS:  There is adequate opacification pulmonary arteries.  There are no large filling defect to suggest pulmonary artery embolus.  There is slightly limited evaluation of the subsegmental pulmonary arteries due to respiratory motion.  Pulmonary arteries are enlarged.  There is cardiomegaly.  There are no pathologically enlarged hilar mediastinal lymph nodes.  Thyroid gland symmetric homogeneous.  No pericardial effusion.  No significant coronary artery atherosclerotic calcification.  There is  pleural calcification and pleural thickening of the left thorax.  Volume loss left thorax.  There is a nodule in the left lower lobe inferiorly on image number 182.  It measures 2.4 by 1.9 cm.  It measures 18 Hounsfield units and could be related to some loculated pleural fluid but pulmonary nodule cannot be excluded.  There is scattered ground-glass opacity geographic in appearance throughout the lung parenchyma.  There is some volume loss and atelectasis in the lingula.  Respiratory motion limits evaluation lung parenchyma.  There is a moderate to large hiatal hernia with majority the stomach fundus above the diaphragm.  Large body habitus limits evaluation.  There is a port in the right anterior chest wall.  The tip terminates at the brachiocephalic SVC junction.  There is a minimally displaced fracture of the lateral right 7th rib as was seen on radiograph.  No other acute appearing fractures are identified.  Osteopenia.  Scoliosis thoracic spine.         1. No findings of pulmonary embolus.  Limited evaluation of subsegmental pulmonary arteries due to respiratory motion.  Enlarged pulmonary arteries, question pulmonary artery hypertension. 2. Chronic appearing volume loss in the left thorax with pleural calcifications likely related to remote infection or trauma. 3. There is a nonspecific 2.4 cm nodule in the inferior left lower lobe.  PET-CT evaluation is recommended.  If patient has any prior CT studies of the chest for comparison that could also be helpful. 4. There is some geographic ground-glass opacity upper lobe predominant which is nonspecific.  This can be seen with air trapping, early infection or inflammation or pulmonary edema. 5. Cardiomegaly. 6. Moderate to large hiatal hernia. 7. There is an acute mildly displaced fracture of the right lateral 7th rib.     MILAGROS JACINTO MD       Electronically Signed and Approved By: MILAGROS JACINTO MD on 12/22/2022 at 19:59             XR Hip With or Without Pelvis 2  - 3 View Left    Result Date: 12/22/2022  PROCEDURE: XR HIP W OR WO PELVIS 2-3 VIEW LEFT  COMPARISON: [facility], CR, RIGHT HIP/PELVIS, 10/28/2018, 15:19.  INDICATIONS: FALL, LEFT HIP PAIN  FINDINGS:  Four (4) views were obtained.  The views are limited.  Grossly, no acute fracture or acute malalignment is identified.  Mild-to-moderate degenerative changes involve the bilateral hip joints.  Chronic posttraumatic changes involve the left pubic bone and the superior and inferior left ischiopubic rami, seen previously.  If symptoms or clinical concerns persist, consider imaging follow-up.        The views are limited.  Grossly, no acute fracture or acute malalignment is appreciated.     Please note that portions of this note were completed with a voice recognition program.  KRYSTEN CLEMENTE JR, MD       Electronically Signed and Approved By: KRYSTEN CLEMENTE JR, MD on 12/22/2022 at 4:28                Procedures:  Procedures    Progress  ED Course as of 12/22/22 2121   Thu Dec 22, 2022   2120 EKG:    Rhythm: sinus  Rate: 94  Axis: normal  Intervals: normal  ST Segment: no elevations      Interpreted by me   [BN]      ED Course User Index  [BN] Shahid Lazcano MD                            The patient was initially evaluated in the triage area where orders were placed. The patient was later dispositioned by Shahid Lazcano MD.      The patient was advised to stay for completion of workup which includes but is not limited to communication of labs and radiological results, reassessment and plan. The patient was advised that leaving prior to disposition by a provider could result in critical findings that are not communicated to the patient.     Medical Decision Making:  MDM  Number of Diagnoses or Management Options  Closed fracture of one rib, unspecified laterality, initial encounter  Diagnosis management comments: The patient´s CBC that was reviewed and interpreted by me shows no abnormalities of critical concern.  Of note, there is no anemia requiring a blood transfusion and the platelet count is acceptable.  The patient´s CMP that was reviewed and interpretted by me shows no abnormalities of critical concern. Of note, the patient´s sodium and potassium are acceptable. The patient´s liver enzymes are unremarkable. The patient´s renal function (creatinine) is preserved. The patient has a normal anion gap.  Urinalysis does show 4+ bacteriuria.  CT scan of the chest is negative for PE.  It does show 1/7 rib fracture.  Case was discussed with Dr. Brownlee who agrees with admission.       Amount and/or Complexity of Data Reviewed  Clinical lab tests: reviewed  Tests in the radiology section of CPT®: reviewed  Tests in the medicine section of CPT®: reviewed  Discussion of test results with the performing providers: yes  Discuss the patient with other providers: yes  Independent visualization of images, tracings, or specimens: yes    Risk of Complications, Morbidity, and/or Mortality  Presenting problems: moderate  Management options: moderate    Patient Progress  Patient progress: stable           The following orders were placed after triage and evaluation:  Orders Placed This Encounter   Procedures   • XR Ribs Right With PA Chest   • CT Chest With Contrast Diagnostic   • Comprehensive Metabolic Panel   • Troponin   • Urinalysis With Microscopic If Indicated (No Culture) - Urine, Clean Catch   • CBC Auto Differential   • Urinalysis, Microscopic Only - Urine, Clean Catch   • ECG 12 Lead Other; weakness   • Initiate Observation Status   • CBC & Differential       Final diagnoses:   Closed fracture of one rib, unspecified laterality, initial encounter          Disposition:  ED Disposition     ED Disposition   Decision to Admit    Condition   --    Comment   Level of Care: Med/Surg [1]   Diagnosis: Intractable pain [366957]   Admitting Physician: LUDY SANCHEZ [[id number]]   Attending Physician: LUDY SANCHEZ [[id number]]               This medical  record created using voice recognition software.    Documentation assistance provided by Ethan Chowdhury acting as scribe for Shahid Lazcano MD. Information recorded by the scribe was done at my direction and has been verified and validated by me.          Ethan Chowdhury  12/22/22 1831       Shahid Lazcano MD  12/22/22 2121

## 2022-12-23 PROBLEM — R06.00 DYSPNEA: Status: ACTIVE | Noted: 2022-12-23

## 2022-12-23 PROBLEM — S62.109A WRIST FRACTURE: Status: ACTIVE | Noted: 2022-12-23

## 2022-12-23 PROBLEM — R91.1 LUNG NODULE: Status: ACTIVE | Noted: 2022-12-23

## 2022-12-23 PROBLEM — S22.49XA RIB FRACTURES: Status: ACTIVE | Noted: 2022-12-23

## 2022-12-23 LAB — NT-PROBNP SERPL-MCNC: 412.1 PG/ML (ref 0–900)

## 2022-12-23 PROCEDURE — 25010000002 FUROSEMIDE PER 20 MG: Performed by: INTERNAL MEDICINE

## 2022-12-23 PROCEDURE — 94640 AIRWAY INHALATION TREATMENT: CPT

## 2022-12-23 PROCEDURE — 97165 OT EVAL LOW COMPLEX 30 MIN: CPT

## 2022-12-23 PROCEDURE — 94799 UNLISTED PULMONARY SVC/PX: CPT

## 2022-12-23 PROCEDURE — 25600 CLTX DST RDL FX/EPHYS SEP WO: CPT | Performed by: ORTHOPAEDIC SURGERY

## 2022-12-23 PROCEDURE — 25010000002 ENOXAPARIN PER 10 MG: Performed by: INTERNAL MEDICINE

## 2022-12-23 PROCEDURE — 83880 ASSAY OF NATRIURETIC PEPTIDE: CPT | Performed by: INTERNAL MEDICINE

## 2022-12-23 PROCEDURE — G0378 HOSPITAL OBSERVATION PER HR: HCPCS

## 2022-12-23 PROCEDURE — 97161 PT EVAL LOW COMPLEX 20 MIN: CPT

## 2022-12-23 PROCEDURE — 97530 THERAPEUTIC ACTIVITIES: CPT

## 2022-12-23 PROCEDURE — 25010000002 KETOROLAC TROMETHAMINE PER 15 MG: Performed by: INTERNAL MEDICINE

## 2022-12-23 PROCEDURE — 25010000002 HYDROMORPHONE 1 MG/ML SOLUTION: Performed by: INTERNAL MEDICINE

## 2022-12-23 RX ORDER — BENZONATATE 100 MG/1
100 CAPSULE ORAL 3 TIMES DAILY PRN
Status: DISCONTINUED | OUTPATIENT
Start: 2022-12-23 | End: 2022-12-27 | Stop reason: HOSPADM

## 2022-12-23 RX ORDER — ATORVASTATIN CALCIUM 20 MG/1
20 TABLET, FILM COATED ORAL NIGHTLY
Status: DISCONTINUED | OUTPATIENT
Start: 2022-12-23 | End: 2022-12-27 | Stop reason: HOSPADM

## 2022-12-23 RX ORDER — GABAPENTIN 300 MG/1
300 CAPSULE ORAL 3 TIMES DAILY
Status: ON HOLD | COMMUNITY
End: 2022-12-26 | Stop reason: SDUPTHER

## 2022-12-23 RX ORDER — AMITRIPTYLINE HYDROCHLORIDE 50 MG/1
50 TABLET, FILM COATED ORAL NIGHTLY
Status: DISCONTINUED | OUTPATIENT
Start: 2022-12-23 | End: 2022-12-27 | Stop reason: HOSPADM

## 2022-12-23 RX ORDER — FUROSEMIDE 10 MG/ML
20 INJECTION INTRAMUSCULAR; INTRAVENOUS EVERY 12 HOURS
Status: DISCONTINUED | OUTPATIENT
Start: 2022-12-23 | End: 2022-12-27 | Stop reason: HOSPADM

## 2022-12-23 RX ORDER — ATORVASTATIN CALCIUM 10 MG/1
10 TABLET, FILM COATED ORAL DAILY
Status: DISCONTINUED | OUTPATIENT
Start: 2022-12-23 | End: 2022-12-23 | Stop reason: DRUGHIGH

## 2022-12-23 RX ORDER — ALBUTEROL SULFATE 2.5 MG/3ML
2.5 SOLUTION RESPIRATORY (INHALATION) EVERY 4 HOURS PRN
Status: DISCONTINUED | OUTPATIENT
Start: 2022-12-23 | End: 2022-12-23

## 2022-12-23 RX ORDER — AMITRIPTYLINE HYDROCHLORIDE 10 MG/1
10 TABLET, FILM COATED ORAL NIGHTLY
Status: DISCONTINUED | OUTPATIENT
Start: 2022-12-23 | End: 2022-12-23 | Stop reason: DRUGHIGH

## 2022-12-23 RX ORDER — ENOXAPARIN SODIUM 100 MG/ML
60 INJECTION SUBCUTANEOUS EVERY 12 HOURS SCHEDULED
Status: DISCONTINUED | OUTPATIENT
Start: 2022-12-23 | End: 2022-12-27 | Stop reason: HOSPADM

## 2022-12-23 RX ORDER — IRBESARTAN 300 MG/1
300 TABLET ORAL NIGHTLY
COMMUNITY

## 2022-12-23 RX ORDER — ACETAMINOPHEN AND CODEINE PHOSPHATE 300; 30 MG/1; MG/1
1 TABLET ORAL EVERY 8 HOURS PRN
COMMUNITY
End: 2022-12-27 | Stop reason: HOSPADM

## 2022-12-23 RX ORDER — BUDESONIDE, GLYCOPYRROLATE, AND FORMOTEROL FUMARATE 160; 9; 4.8 UG/1; UG/1; UG/1
2 AEROSOL, METERED RESPIRATORY (INHALATION) 2 TIMES DAILY
COMMUNITY

## 2022-12-23 RX ORDER — OMEPRAZOLE 40 MG/1
40 CAPSULE, DELAYED RELEASE ORAL DAILY
COMMUNITY

## 2022-12-23 RX ORDER — MONTELUKAST SODIUM 10 MG/1
10 TABLET ORAL NIGHTLY
Status: DISCONTINUED | OUTPATIENT
Start: 2022-12-23 | End: 2022-12-27 | Stop reason: HOSPADM

## 2022-12-23 RX ORDER — IPRATROPIUM BROMIDE AND ALBUTEROL SULFATE 2.5; .5 MG/3ML; MG/3ML
3 SOLUTION RESPIRATORY (INHALATION)
Status: DISCONTINUED | OUTPATIENT
Start: 2022-12-23 | End: 2022-12-27 | Stop reason: HOSPADM

## 2022-12-23 RX ORDER — LEVOTHYROXINE SODIUM 0.07 MG/1
75 TABLET ORAL DAILY
COMMUNITY

## 2022-12-23 RX ADMIN — MICONAZOLE NITRATE 1 APPLICATION: 20 CREAM TOPICAL at 20:32

## 2022-12-23 RX ADMIN — HYDROMORPHONE HYDROCHLORIDE 0.5 MG: 1 INJECTION, SOLUTION INTRAMUSCULAR; INTRAVENOUS; SUBCUTANEOUS at 20:32

## 2022-12-23 RX ADMIN — ATORVASTATIN CALCIUM 20 MG: 20 TABLET, FILM COATED ORAL at 20:32

## 2022-12-23 RX ADMIN — MICONAZOLE NITRATE 1 APPLICATION: 20 CREAM TOPICAL at 14:20

## 2022-12-23 RX ADMIN — KETOROLAC TROMETHAMINE 30 MG: 30 INJECTION, SOLUTION INTRAMUSCULAR; INTRAVENOUS at 22:46

## 2022-12-23 RX ADMIN — IPRATROPIUM BROMIDE AND ALBUTEROL SULFATE 3 ML: .5; 3 SOLUTION RESPIRATORY (INHALATION) at 12:43

## 2022-12-23 RX ADMIN — IPRATROPIUM BROMIDE AND ALBUTEROL SULFATE 3 ML: .5; 3 SOLUTION RESPIRATORY (INHALATION) at 23:15

## 2022-12-23 RX ADMIN — ENOXAPARIN SODIUM 60 MG: 100 INJECTION SUBCUTANEOUS at 20:31

## 2022-12-23 RX ADMIN — KETOROLAC TROMETHAMINE 30 MG: 30 INJECTION, SOLUTION INTRAMUSCULAR; INTRAVENOUS at 07:49

## 2022-12-23 RX ADMIN — HYDROMORPHONE HYDROCHLORIDE 0.5 MG: 1 INJECTION, SOLUTION INTRAMUSCULAR; INTRAVENOUS; SUBCUTANEOUS at 11:57

## 2022-12-23 RX ADMIN — KETOROLAC TROMETHAMINE 30 MG: 30 INJECTION, SOLUTION INTRAMUSCULAR; INTRAVENOUS at 15:39

## 2022-12-23 RX ADMIN — IPRATROPIUM BROMIDE AND ALBUTEROL SULFATE 3 ML: .5; 3 SOLUTION RESPIRATORY (INHALATION) at 19:55

## 2022-12-23 RX ADMIN — AMITRIPTYLINE HYDROCHLORIDE 50 MG: 50 TABLET, FILM COATED ORAL at 20:32

## 2022-12-23 RX ADMIN — FUROSEMIDE 20 MG: 10 INJECTION, SOLUTION INTRAMUSCULAR; INTRAVENOUS at 13:39

## 2022-12-23 RX ADMIN — IPRATROPIUM BROMIDE AND ALBUTEROL SULFATE 3 ML: .5; 3 SOLUTION RESPIRATORY (INHALATION) at 15:50

## 2022-12-23 RX ADMIN — BENZONATATE 100 MG: 100 CAPSULE ORAL at 20:32

## 2022-12-23 RX ADMIN — ENOXAPARIN SODIUM 40 MG: 100 INJECTION SUBCUTANEOUS at 09:20

## 2022-12-23 RX ADMIN — MONTELUKAST 10 MG: 10 TABLET, FILM COATED ORAL at 20:32

## 2022-12-23 RX ADMIN — HYDROMORPHONE HYDROCHLORIDE 0.5 MG: 1 INJECTION, SOLUTION INTRAMUSCULAR; INTRAVENOUS; SUBCUTANEOUS at 01:56

## 2022-12-23 RX ADMIN — HYDROMORPHONE HYDROCHLORIDE 0.5 MG: 1 INJECTION, SOLUTION INTRAMUSCULAR; INTRAVENOUS; SUBCUTANEOUS at 14:20

## 2022-12-23 RX ADMIN — HYDROMORPHONE HYDROCHLORIDE 0.5 MG: 1 INJECTION, SOLUTION INTRAMUSCULAR; INTRAVENOUS; SUBCUTANEOUS at 09:20

## 2022-12-23 RX ADMIN — HYDROMORPHONE HYDROCHLORIDE 0.5 MG: 1 INJECTION, SOLUTION INTRAMUSCULAR; INTRAVENOUS; SUBCUTANEOUS at 17:47

## 2022-12-23 RX ADMIN — HYDROMORPHONE HYDROCHLORIDE 0.5 MG: 1 INJECTION, SOLUTION INTRAMUSCULAR; INTRAVENOUS; SUBCUTANEOUS at 06:45

## 2022-12-23 NOTE — PLAN OF CARE
Goal Outcome Evaluation:  Plan of Care Reviewed With: patient        Progress: no change  Outcome Evaluation: Patient presents with limitations affecting strength, activity tolerance, and balance impacting patient's ability to return home safely and independently.  The skills of a therapist will be required to safely and effectively implement the following treatment plan to restore maximal level of function

## 2022-12-23 NOTE — SIGNIFICANT NOTE
12/23/22 1240   Plan   Plan [facility] [place], and [facility] can offer pt a bed pending precert. Pt has accepted [facility]. Precert will be started once PT eval and additional clinicals are available.

## 2022-12-23 NOTE — THERAPY EVALUATION
Acute Care - Physical Therapy Initial Evaluation  [facility]AL[facility]     Patient Name: Lona Rodríguez  : 1950  MRN: [id number]  Today's Date: 2022      Visit Dx:     ICD-10-CM ICD-9-CM   1. Closed fracture of one rib, unspecified laterality, initial encounter  S22.39XA 807.01   2. Decreased activities of daily living (ADL)  Z78.9 V49.89   3. Difficulty walking  R26.2 719.7     Patient Active Problem List   Diagnosis   • Intractable pain   • Wrist fracture   • Rib fractures   • Lung nodule   • Dyspnea     Past Medical History:   Diagnosis Date   • AMD (age related macular degeneration)    • Arthritis    • Asthma    • COPD (chronic obstructive pulmonary disease) ([facility])    • Fibromyalgia    • Hyperlipidemia    • Hypertension    • Obesity    • Polymyalgia arteritica ([facility])    • Sciatica      Past Surgical History:   Procedure Laterality Date   • REPLACEMENT TOTAL KNEE     • TONSILLECTOMY       PT Assessment (last 12 hours)     PT Evaluation and Treatment     Row Name 22 1314          Physical Therapy Time and Intention    Subjective Information complains of;weakness;fatigue;pain  -CW     Document Type evaluation  -CW     Mode of Treatment individual therapy;physical therapy  -CW     Patient Effort adequate  -CW     Symptoms Noted During/After Treatment fatigue;dizziness  -CW     Row Name 22 1314          General Information    Patient Profile Reviewed yes  -CW     Patient Observations alert;cooperative;agree to therapy  -CW     Prior Level of Function independent:;gait;transfer;ADL's  -CW     Equipment Currently Used at Home rollator  -CW     Row Name 22 1314          Living Environment    Current Living Arrangements home  -CW     Home Accessibility stairs to enter home;stairs within home  -CW     People in Home alone  -CW     Row Name 22 1314          Home Main Entrance    Number of Stairs, Main Entrance none  -CW     Stairs Comment, Main Entrance ramp  -CW     Row Name 22 1314           Stairs Within Home, Primary    Number of Stairs, Within Home, Primary none  -CW     Row Name 12/23/22 1314          Range of Motion (ROM)    Range of Motion bilateral lower extremities;ROM is WFL  -CW     Row Name 12/23/22 1314          Strength (Manual Muscle Testing)    Strength (Manual Muscle Testing) bilateral lower extremities  3/5  -CW     Row Name 12/23/22 1314          Mobility    Extremity Weight-bearing Status right upper extremity  -CW     Left Upper Extremity (Weight-bearing Status) other (see comments);non weight-bearing (NWB)  no WB orders, pt states MD told her NWB until she is casted  -CW     Row Name 12/23/22 1314          Bed Mobility    Bed Mobility sit-supine  -CW     Sit-Supine [place] (Bed Mobility) maximum assist (25% patient effort);2 person assist  -CW     Row Name 12/23/22 1314          Transfers    Transfers sit-stand transfer;stand-sit transfer;chair-bed transfer  -     Row Name 12/23/22 1314          Chair-Bed Transfer    Chair-Bed [place] (Transfers) minimum assist (75% patient effort);2 person assist  -     Assistive Device (Chair-Bed Transfers) other (see comments)  HHA  -CW     Row Name 12/23/22 1314          Sit-Stand Transfer    Sit-Stand [place] (Transfers) minimum assist (75% patient effort)  -CW     Assistive Device (Sit-Stand Transfers) other (see comments)  HHA  -CW     Row Name 12/23/22 1314          Stand-Sit Transfer    Stand-Sit [place] (Transfers) minimum assist (75% patient effort)  -CW     Assistive Device (Stand-Sit Transfers) other (see comments)  HHA  -CW     Row Name 12/23/22 1314          Gait/Stairs (Locomotion)    Gait/Stairs Locomotion gait/ambulation independence;gait/ambulation assistive device;distance ambulated  -     [place] Level (Gait) minimum assist (75% patient effort);2 person assist  -CW     Assistive Device (Gait) other (see comments)  HHA  -     Distance in Feet (Gait) 3'  -CW     Deviations/Abnormal Patterns  (Gait) base of support, narrow;festinating/shuffling;gait speed decreased;stride length decreased  -CW     Row Name 12/23/22 1314          Balance    Balance Assessment standing dynamic balance  -CW     Dynamic Standing Balance minimal assist;2-person assist  -CW     Row Name             Wound 12/22/22 2307 Left upper groin MASD (Moisture associated skin damage)    Wound - Properties Group Placement Date: 12/22/22  -KM Placement Time: 2307  -KM Present on Hospital Admission: Y  -KM Side: Left  -KM Orientation: upper  -KM Location: groin  -KM Primary Wound Type: MASD  -KM    Retired Wound - Properties Group Placement Date: 12/22/22  -KM Placement Time: 2307  -KM Present on Hospital Admission: Y  -KM Side: Left  -KM Orientation: upper  -KM Location: groin  -KM Primary Wound Type: MASD  -KM    Retired Wound - Properties Group Date first assessed: 12/22/22  -KM Time first assessed: 2307  -KM Present on Hospital Admission: Y  -KM Side: Left  -KM Location: groin  -KM Primary Wound Type: MASD  -KM    Row Name 12/23/22 1314          Plan of Care Review    Plan of Care Reviewed With patient  -CW     Outcome Evaluation Pt presents with deficits in strength, balance, and functional mobililty. Pt also exhibits increased anxiety and fear of falling with ambulation. Pt would benefit from skilled PT services to address mentioned impairments and allow pt to achieve optimal functional mobility. Recommend rehab upon discharge from hospital.  -CW     Row Name 12/23/22 1314          Positioning and Restraints    Pre-Treatment Position sitting in chair/recliner  -CW     Post Treatment Position bed  -CW     In Bed fowlers;call light within reach;encouraged to call for assist;exit alarm on;with nsg  -CW     Row Name 12/23/22 1314          Therapy Assessment/Plan (PT)    Rehab Potential (PT) good, to achieve stated therapy goals  -CW     Criteria for Skilled Interventions Met (PT) yes;skilled treatment is necessary  -CW     Therapy  Frequency (PT) daily  -CW     Predicted Duration of Therapy Intervention (PT) 10 days  -CW     Problem List (PT) problems related to;balance;mobility;strength  -CW     Activity Limitations Related to Problem List (PT) unable to ambulate safely;unable to transfer safely  -CW     Row Name 12/23/22 1314          PT Evaluation Complexity    History, PT Evaluation Complexity 1-2 personal factors and/or comorbidities  -CW     Examination of Body Systems (PT Eval Complexity) total of 4 or more elements  -CW     Clinical Presentation (PT Evaluation Complexity) stable  -CW     Clinical Decision Making (PT Evaluation Complexity) low complexity  -CW     Overall Complexity (PT Evaluation Complexity) low complexity  -CW     Row Name 12/23/22 1314          Therapy Plan Review/Discharge Plan (PT)    Therapy Plan Review (PT) evaluation/treatment results reviewed;patient  -CW     Row Name 12/23/22 1314          Physical Therapy Goals    Bed Mobility Goal Selection (PT) bed mobility, PT goal 1  -CW     Transfer Goal Selection (PT) transfer, PT goal 1  -CW     Gait Training Goal Selection (PT) gait training, PT goal 1  -     Row Name 12/23/22 1314          Bed Mobility Goal 1 (PT)    Activity/Assistive Device (Bed Mobility Goal 1, PT) sit to supine/supine to sit  -CW     [place] Level/Cues Needed (Bed Mobility Goal 1, PT) minimum assist (75% or more patient effort)  -CW     Time Frame (Bed Mobility Goal 1, PT) 10 days  -     Row Name 12/23/22 1314          Transfer Goal 1 (PT)    Activity/Assistive Device (Transfer Goal 1, PT) sit-to-stand/stand-to-sit;walker, rolling platform  -CW     [place] Level/Cues Needed (Transfer Goal 1, PT) standby assist  -CW     Time Frame (Transfer Goal 1, PT) 10 days  -     Row Name 12/23/22 1314          Gait Training Goal 1 (PT)    Activity/Assistive Device (Gait Training Goal 1, PT) gait (walking locomotion);assistive device use;walker, rolling platform  -CW     [place] Level (Gait  Training Goal 1, PT) minimum assist (75% or more patient effort)  -CW     Distance (Gait Training Goal 1, PT) 100'  -CW     Time Frame (Gait Training Goal 1, PT) 10 days  -CW           User Key  (r) = Recorded By, (t) = Taken By, (c) = Cosigned By    Initials Name Provider Type    Nuzhat Garcia RNA Registered Nurse    Aracely Winston, PT Physical Therapist                Physical Therapy Education     Title: PT OT SLP Therapies (Done)     Topic: Physical Therapy (Done)     Point: Mobility training (Done)     Learning Progress Summary           Patient Acceptance, E,TB, VU by CW at 12/23/2022 1324                   Point: Body mechanics (Done)     Learning Progress Summary           Patient Acceptance, E,TB, VU by CW at 12/23/2022 1324                   Point: Precautions (Done)     Learning Progress Summary           Patient Acceptance, E,TB, VU by CW at 12/23/2022 1324                               User Key     Initials Effective Dates Name Provider Type Discipline     09/23/22 -  Aracely Contreras, PT Physical Therapist PT              PT Recommendation and Plan  Anticipated Discharge Disposition (PT): inpatient rehabilitation facility, sub acute care setting  Planned Therapy Interventions (PT): balance training, bed mobility training, gait training, home exercise program, neuromuscular re-education, postural re-education, patient/family education, ROM (range of motion), stair training, strengthening, stretching, transfer training  Therapy Frequency (PT): daily  Plan of Care Reviewed With: patient  Outcome Evaluation: Pt presents with deficits in strength, balance, and functional mobililty. Pt also exhibits increased anxiety and fear of falling with ambulation. Pt would benefit from skilled PT services to address mentioned impairments and allow pt to achieve optimal functional mobility. Recommend rehab upon discharge from hospital.   Outcome Measures     Row Name 12/23/22 1324             How much help  from another person do you currently need...    Turning from your back to your side while in flat bed without using bedrails? 2  -CW      Moving from lying on back to sitting on the side of a flat bed without bedrails? 2  -CW      Moving to and from a bed to a chair (including a wheelchair)? 2  -CW      Standing up from a chair using your arms (e.g., wheelchair, bedside chair)? 3  -CW      Climbing 3-5 steps with a railing? 1  -CW      To walk in hospital room? 1  -CW      AM-PAC 6 Clicks Score (PT) 11  -CW         Functional Assessment    Outcome Measure Options AM-PAC 6 Clicks Basic Mobility (PT)  -CW            User Key  (r) = Recorded By, (t) = Taken By, (c) = Cosigned By    Initials Name Provider Type    Aracely Winston PT Physical Therapist                 Time Calculation:    PT Charges     Row Name 12/23/22 1326             Time Calculation    PT Received On 12/23/22  -CW      PT Goal Re-Cert Due Date 01/01/23  -CW         Untimed Charges    PT Eval/Re-eval Minutes 35  -CW         Total Minutes    Untimed Charges Total Minutes 35  -CW       Total Minutes 35  -CW            User Key  (r) = Recorded By, (t) = Taken By, (c) = Cosigned By    Initials Name Provider Type    Aracely Winston PT Physical Therapist              Therapy Charges for Today     Code Description Service Date Service Provider Modifiers Qty    [id number] HC PT EVAL LOW COMPLEXITY 3 12/23/2022 Aracely Contreras, HAYDEE GP 1          PT G-Codes  Outcome Measure Options: AM-PAC 6 Clicks Basic Mobility (PT)  AM-PAC 6 Clicks Score (PT): 11  AM-PAC 6 Clicks Score (OT): 10    Aracely Contreras PT  12/23/2022

## 2022-12-23 NOTE — CONSULTS
Pt reports having educational information regarding Advance Directives. Pt reports she does not have the information to appoint a HCS. Pt reports she wants to speak to that individual first. SW informed pt document can be completed at any time.

## 2022-12-23 NOTE — SIGNIFICANT NOTE
Wound Eval / Progress Noted    [facility]     Patient Name: Lona Rodríguez  : 1950  MRN: [id number]  Today's Date: 2022                 Admit Date: 2022    Visit Dx:    ICD-10-CM ICD-9-CM   1. Closed fracture of one rib, unspecified laterality, initial encounter  S22.39XA 807.01   2. Decreased activities of daily living (ADL)  Z78.9 V49.89       Patient Active Problem List   Diagnosis   • Intractable pain   • Wrist fracture   • Rib fractures   • Lung nodule   • Dyspnea        Past Medical History:   Diagnosis Date   • AMD (age related macular degeneration)    • Arthritis    • Asthma    • COPD (chronic obstructive pulmonary disease) ([facility])    • Fibromyalgia    • Hyperlipidemia    • Hypertension    • Obesity    • Polymyalgia arteritica ([facility])    • Sciatica         Past Surgical History:   Procedure Laterality Date   • REPLACEMENT TOTAL KNEE     • TONSILLECTOMY           Physical Assessment:  Wound 22 Left upper groin MASD (Moisture associated skin damage) (Active)   Wound Image   22   Dressing Appearance open to air 22 1054   Closure None 22 1054   Base red;dry 22 1054   Periwound intact;redness;pink 22 1054   Periwound Temperature warm 22 1054   Periwound Skin Turgor soft 22 1054   Edges rolled/closed 22 1054   Drainage Amount none 22 1054   Care, Wound cleansed with;sterile normal saline 22 1054   Dressing Care open to air 22 1054   Periwound Care barrier ointment applied 227      Wound Check / Follow-up:  Patient seen today for wound consult. Patient with small area of fungal rash to left abdominal/groin fold. Cleansed with normal saline and gauze. Recommending BID skin care with application of miconazole cream. Patient denies any other skin issues and need for further assessment at this time.      Impression: Fungal rash.      Short term goals: Regain skin integrity, topical treatment, skin protection,  moisture prevention.      Kylah Romero RN    12/23/2022    12:11 EST

## 2022-12-23 NOTE — PLAN OF CARE
Goal Outcome Evaluation:  Plan of Care Reviewed With: patient        Progress: no change     Pt pain managed with rotation of dilaudid and Toradol IM.  Pt BP WNL for pt.  1 episode of nigh BP in early am.  Reduced once pain medication adm.      Pt up to use bedside commode with 2 assist.

## 2022-12-23 NOTE — PLAN OF CARE
Goal Outcome Evaluation:  Plan of Care Reviewed With: patient           Outcome Evaluation: Pt presents with deficits in strength, balance, and functional mobililty. Pt also exhibits increased anxiety and fear of falling with ambulation. Pt would benefit from skilled PT services to address mentioned impairments and allow pt to achieve optimal functional mobility. Recommend rehab upon discharge from hospital.

## 2022-12-23 NOTE — CONSULTS
[facility]   Consult Note    Patient Name: Lona Rodríguez  : 1950  MRN: [id number]  Primary Care Physician:  Christopher Thao MD  Referring Physician: No ref. provider found  Date of admission: 2022    Consults  Subjective   Subjective     Reason for Consult/ Chief Complaint: Right wrist pain after a fall    History of Present Illness  Lona Rodríguez is a 72 y.o. female who fell approximately 2 days ago and was diagnosed with a right distal radius fracture.  She was sent home but returned degenerate ability to ambulate because of pain.  Believes she has been worked up for possible discharge to a care facility.  Complaints of pain and swelling to her wrist.  Said the splint is sticking into her forearm.    Review of Systems     Personal History     Past Medical History:   Diagnosis Date   • AMD (age related macular degeneration)    • Arthritis    • Asthma    • COPD (chronic obstructive pulmonary disease) ([facility])    • Fibromyalgia    • Hyperlipidemia    • Hypertension    • Obesity    • Polymyalgia arteritica ([facility])    • Sciatica        Past Surgical History:   Procedure Laterality Date   • REPLACEMENT TOTAL KNEE     • TONSILLECTOMY         Family History: Her family history is not on file.     Social History: She  reports that she has never smoked. She has never used smokeless tobacco. She reports that she does not drink alcohol and does not use drugs.    Home Medications:  Budeson-Glycopyrrol-Formoterol, HYDROcodone-acetaminophen, acetaminophen-codeine, amitriptyline, atorvastatin, gabapentin, irbesartan, levothyroxine, montelukast, omeprazole, and vitamin D    Allergies:  She is allergic to morphine.    Objective    Objective     Vitals:    Temp:  [97.2 °F (36.2 °C)-99.1 °F (37.3 °C)] 97.9 °F (36.6 °C)  Heart Rate:  [] 91  Resp:  [18-22] 18  BP: (126-157)/() 141/75    Physical Exam  Awake and alert, pain in right wrist, moderate swelling, able to wiggle her fingers, no elbow pain, no  shoulder pain, pain with palpation to her right wrist.  Decreased range of motion.  No left upper extremity pain.  Previous total knee incisions well-healed.  Result Review    Result Review:  I have personally reviewed the results from the time of this admission to 12/23/2022 13:30 EST and agree with these findings:  [x]  Laboratory list / accordion  []  Microbiology  [x]  Radiology  []  EKG/Telemetry   []  Cardiology/Vascular   []  Pathology  []  Old records  []  Other:  Most notable findings include: Right distal radius fracture      Assessment & Plan   Assessment / Plan     Brief Patient Summary:  Lona Rodríguez is a 72 y.o. female right distal radius fracture with mild displacement.    Active Hospital Problems:  Active Hospital Problems    Diagnosis    • **Intractable pain    • Wrist fracture    • Rib fractures    • Lung nodule    • Dyspnea        Plan:   Is awaiting placement in rehab.  She is placed in a well padded well molded short arm cast.  Okay to use platform walker.  Follow-up with me in approximately a week for repeat x-rays    Precious Kennedy MD

## 2022-12-23 NOTE — OP NOTE
Procedure Report    Patient Name:  Lona Rodríguez  YOB: 1950    Date of Surgery:       Indications: Distal radius fracture    Pre-op Diagnosis:   Comminuted mildly displaced distal radius fracture       Post-Op Diagnosis Codes:  Same    Procedure/CPT® Codes:  Closed treatment with a short arm cast with molding            Staff:  Surgeon: Dr. Precious Kennedy  Assistant: Margaret Almaraz    Anesthesia: None    Estimated Blood Loss: None        Specimen:          None        Findings: Right distal radius fracture    Complications: None    Description of Procedure: Risk and benefits of continued splinting versus casting were explained.  Patient desires to have a cast.  While holding traction by the assistant I placed a well-padded well molded short arm cast.  This was molded to help with alignment.  Follow-up in 1 week for repeat imaging                      Precious Kennedy MD     Date: 12/23/2022  Time: 13:34 EST

## 2022-12-23 NOTE — THERAPY EVALUATION
Patient Name: Lona Rodríguez  : 1950    MRN: [id number]                              Today's Date: 2022       Admit Date: 2022    Visit Dx:     ICD-10-CM ICD-9-CM   1. Closed fracture of one rib, unspecified laterality, initial encounter  S22.39XA 807.01   2. Decreased activities of daily living (ADL)  Z78.9 V49.89     Patient Active Problem List   Diagnosis   • Intractable pain     Past Medical History:   Diagnosis Date   • AMD (age related macular degeneration)    • Arthritis    • Asthma    • COPD (chronic obstructive pulmonary disease) ([facility])    • Fibromyalgia    • Hyperlipidemia    • Hypertension    • Obesity    • Polymyalgia arteritica ([facility])    • Sciatica      Past Surgical History:   Procedure Laterality Date   • REPLACEMENT TOTAL KNEE     • TONSILLECTOMY        General Information     Row Name 22       OT Time and Intention    Document Type therapy note (daily note)  -PG evaluation  -PG    Mode of Treatment individual therapy;occupational therapy  -PG individual therapy;occupational therapy  -PG    Row Name 22          General Information    Patient Profile Reviewed yes  -PG     Prior Level of Function independent:;transfer;ADL's  -PG     Existing Precautions/Restrictions fall;non-weight bearing  -PG     Barriers to Rehab none identified  -PG     Row Name 22          Occupational Profile    Reason for Services/Referral (Occupational Profile) Patient is a pleasant 72-year-old female who sustained a fall at home fracturing her right wrist and 1 rib.  No previous OT services identified.  Patient being evaluated by Occupational Therapy due to recent decline in ADL function  -PG     Row Name 22          Living Environment    People in Home alone  -PG     Row Name 22          Cognition    Orientation Status (Cognition) oriented x 3  -PG     Row Name 22          Safety Issues, Functional Mobility    Impairments  Affecting Function (Mobility) balance;endurance/activity tolerance;range of motion (ROM);strength;shortness of breath;pain  -PG           User Key  (r) = Recorded By, (t) = Taken By, (c) = Cosigned By    Initials Name Provider Type    PG Raudel Bartlett, OT Occupational Therapist                 Mobility/ADL's     Row Name 12/23/22 0916 12/23/22 0908       Bed Mobility    Bed Mobility supine-sit  -PG supine-sit  -PG    Supine-Sit [place] (Bed Mobility) 2 person assist;maximum assist (25% patient effort);moderate assist (50% patient effort)  - 2 person assist;maximum assist (25% patient effort);moderate assist (50% patient effort)  -    Row Name 12/23/22 0916 12/23/22 0908       Transfers    Transfers bed-chair transfer  -PG bed-chair transfer  -PG    Row Name 12/23/22 0916 12/23/22 0908       Bed-Chair Transfer    Bed-Chair [place] (Transfers) minimum assist (75% patient effort);2 person assist;verbal cues  - minimum assist (75% patient effort);2 person assist;verbal cues  -    Row Name 12/23/22 0908          Activities of Daily Living    BADL Assessment/Intervention bathing;upper body dressing;lower body dressing;grooming;toileting  -     Row Name 12/23/22 0908          Bathing Assessment/Intervention    [place] Level (Bathing) bathing skills;maximum assist (25% patient effort);dependent (less than 25% patient effort)  -     Row Name 12/23/22 09          Upper Body Dressing Assessment/Training    [place] Level (Upper Body Dressing) upper body dressing skills;maximum assist (25% patient effort);dependent (less than 25% patient effort)  -     Row Name 12/23/22 0908          Lower Body Dressing Assessment/Training    [place] Level (Lower Body Dressing) lower body dressing skills;dependent (less than 25% patient effort)  -     Row Name 12/23/22 09          Grooming Assessment/Training    [place] Level (Grooming) grooming skills;minimum assist (75% patient effort)  -      Row Name 12/23/22 0908          Toileting Assessment/Training    [place] Level (Toileting) toileting skills;dependent (less than 25% patient effort)  -PG           User Key  (r) = Recorded By, (t) = Taken By, (c) = Cosigned By    Initials Name Provider Type    PG Raudel Bartlett OT Occupational Therapist               Obj/Interventions     Row Name 12/23/22 0909          Sensory Assessment (Somatosensory)    Sensory Assessment (Somatosensory) sensation intact  -PG     Row Name 12/23/22 0909          Vision Assessment/Intervention    Visual Impairment/Limitations WFL  -PG     Row Name 12/23/22 0909          Range of Motion Comprehensive    General Range of Motion upper extremity range of motion deficits identified  -PG     Row Name 12/23/22 0909          Strength Comprehensive (MMT)    General Manual Muscle Testing (MMT) Assessment upper extremity strength deficits identified  -PG     Comment, General Manual Muscle Testing (MMT) Assessment Grossly 3+ bilateral upper extremities  -PG     Row Name 12/23/22 0909          Motor Skills    Motor Skills coordination;functional endurance  -PG     Coordination WFL  -PG     Functional Endurance Poor plus  -PG           User Key  (r) = Recorded By, (t) = Taken By, (c) = Cosigned By    Initials Name Provider Type    PG Raudel Bartlett OT Occupational Therapist               Goals/Plan     Row Name 12/23/22 0911          Transfer Goal 1 (OT)    Activity/Assistive Device (Transfer Goal 1, OT) transfers, all  -PG     [place] Level/Cues Needed (Transfer Goal 1, OT) supervision required  -PG     Time Frame (Transfer Goal 1, OT) long term goal (LTG);10 days  -PG     Row Name 12/23/22 0911          Bathing Goal 1 (OT)    Activity/Device (Bathing Goal 1, OT) bathing skills, all  -PG     [place] Level/Cues Needed (Bathing Goal 1, OT) supervision required  -PG     Time Frame (Bathing Goal 1, OT) long term goal (LTG);10 days  -PG     Row Name 12/23/22 0911          Dressing  Goal 1 (OT)    Activity/Device (Dressing Goal 1, OT) dressing skills, all  -PG     [place]/Cues Needed (Dressing Goal 1, OT) minimum assist (75% or more patient effort)  -PG     Time Frame (Dressing Goal 1, OT) long term goal (LTG);10 days  -PG     Row Name 12/23/22 0911          Toileting Goal 1 (OT)    Activity/Device (Toileting Goal 1, OT) toileting skills, all  -PG     [place] Level/Cues Needed (Toileting Goal 1, OT) minimum assist (75% or more patient effort)  -PG     Time Frame (Toileting Goal 1, OT) long term goal (LTG);10 days  -PG     Row Name 12/23/22 0911          Grooming Goal 1 (OT)    Activity/Device (Grooming Goal 1, OT) grooming skills, all  -PG     [place] (Grooming Goal 1, OT) minimum assist (75% or more patient effort)  -PG     Time Frame (Grooming Goal 1, OT) long term goal (LTG);10 days  -PG     Row Name 12/23/22 0911          Problem Specific Goal 1 (OT)    Problem Specific Goal 1 (OT) Patient will improve activity tolerance to fair plus to support independence and engagement with ADL activities  -PG     Time Frame (Problem Specific Goal 1, OT) long term goal (LTG);10 days  -PG     Row Name 12/23/22 0911          Therapy Assessment/Plan (OT)    Planned Therapy Interventions (OT) activity tolerance training;BADL retraining;strengthening exercise;transfer/mobility retraining;patient/caregiver education/training;occupation/activity based interventions  -PG           User Key  (r) = Recorded By, (t) = Taken By, (c) = Cosigned By    Initials Name Provider Type    PG Raudel Bartlett, OT Occupational Therapist               Clinical Impression     Row Name 12/23/22 0910          Pain Assessment    Pretreatment Pain Rating 8/10  -PG     Posttreatment Pain Rating 8/10  -PG     Pain Location generalized  -PG     Pain Intervention(s) Nursing Notified  -PG     Row Name 12/23/22 0910          Plan of Care Review    Plan of Care Reviewed With patient  -PG     Progress no change  -PG     Outcome  Evaluation Patient presents with limitations affecting strength, activity tolerance, and balance impacting patient's ability to return home safely and independently.  The skills of a therapist will be required to safely and effectively implement the following treatment plan to restore maximal level of function  -PG     Row Name 12/23/22 0910          Therapy Assessment/Plan (OT)    Patient/Family Therapy Goal Statement (OT) Patient would like to be in less pain and return home independently  -PG     Rehab Potential (OT) good, to achieve stated therapy goals  -PG     Criteria for Skilled Therapeutic Interventions Met (OT) yes;meets criteria;skilled treatment is necessary  -PG     Therapy Frequency (OT) 5 times/wk  -PG     Row Name 12/23/22 0910          Therapy Plan Review/Discharge Plan (OT)    Anticipated Discharge Disposition (OT) skilled nursing facility;inpatient rehabilitation facility;sub acute care setting  -PG           User Key  (r) = Recorded By, (t) = Taken By, (c) = Cosigned By    Initials Name Provider Type    PG Raudel Bartlett, OT Occupational Therapist               Outcome Measures     Row Name 12/23/22 0914          How much help from another is currently needed...    Putting on and taking off regular lower body clothing? 1  -PG     Bathing (including washing, rinsing, and drying) 1  -PG     Toileting (which includes using toilet bed pan or urinal) 1  -PG     Putting on and taking off regular upper body clothing 2  -PG     Taking care of personal grooming (such as brushing teeth) 2  -PG     Eating meals 3  -PG     AM-PAC 6 Clicks Score (OT) 10  -PG     Row Name 12/22/22 7950          How much help from another person do you currently need...    Turning from your back to your side while in flat bed without using bedrails? 2  -KM     Moving from lying on back to sitting on the side of a flat bed without bedrails? 2  -KM     Moving to and from a bed to a chair (including a wheelchair)? 2  -KM      Standing up from a chair using your arms (e.g., wheelchair, bedside chair)? 1  -KM     Climbing 3-5 steps with a railing? 1  -KM     To walk in hospital room? 1  -KM     AM-PAC 6 Clicks Score (PT) 9  -KM     Highest level of mobility 3 --> Sat at edge of bed  -KM     Row Name 12/23/22 0914          Functional Assessment    Outcome Measure Options AM-PAC 6 Clicks Daily Activity (OT);Optimal Instrument  -PG     Row Name 12/23/22 0914          Optimal Instrument    Optimal Instrument Optimal - 3  -PG     Bending/Stooping 4  -PG     Standing 3  -PG     Reaching 2  -PG     From the list, choose the 3 activities you would most like to be able to do without any difficulty Bending/stooping;Standing;Reaching  -PG     Total Score Optimal - 3 9  -PG           User Key  (r) = Recorded By, (t) = Taken By, (c) = Cosigned By    Initials Name Provider Type    Nuzhat Garcia RNA Registered Nurse    Raudel Martell OT Occupational Therapist                Occupational Therapy Education     Title: PT OT SLP Therapies (Done)     Topic: Occupational Therapy (Done)     Point: ADL training (Done)     Description:   Instruct learner(s) on proper safety adaptation and remediation techniques during self care or transfers.   Instruct in proper use of assistive devices.              Learning Progress Summary           Patient Acceptance, E,D, DU by PG at 12/23/2022 0915                   Point: Home exercise program (Done)     Description:   Instruct learner(s) on appropriate technique for monitoring, assisting and/or progressing therapeutic exercises/activities.              Learning Progress Summary           Patient Acceptance, E,D, DU by PG at 12/23/2022 0915                   Point: Precautions (Done)     Description:   Instruct learner(s) on prescribed precautions during self-care and functional transfers.              Learning Progress Summary           Patient Acceptance, E,D, DU by PG at 12/23/2022 0915                    Point: Body mechanics (Done)     Description:   Instruct learner(s) on proper positioning and spine alignment during self-care, functional mobility activities and/or exercises.              Learning Progress Summary           Patient Acceptance, E,D, DU by PG at 12/23/2022 0915                               User Key     Initials Effective Dates Name Provider Type Discipline    PG 06/16/21 -  Raudel Bartlett OT Occupational Therapist OT              OT Recommendation and Plan  Planned Therapy Interventions (OT): activity tolerance training, BADL retraining, strengthening exercise, transfer/mobility retraining, patient/caregiver education/training, occupation/activity based interventions  Therapy Frequency (OT): 5 times/wk  Plan of Care Review  Plan of Care Reviewed With: patient  Progress: no change  Outcome Evaluation: Patient presents with limitations affecting strength, activity tolerance, and balance impacting patient's ability to return home safely and independently.  The skills of a therapist will be required to safely and effectively implement the following treatment plan to restore maximal level of function     Time Calculation:    Time Calculation- OT     Row Name 12/23/22 0917             Time Calculation- OT    OT Received On 12/23/22  -PG      OT Goal Re-Cert Due Date 01/01/23  -PG         Timed Charges    02128 - OT Therapeutic Activity Minutes 10  -PG         Untimed Charges    OT Eval/Re-eval Minutes 35  -PG         Total Minutes    Timed Charges Total Minutes 10  -PG      Untimed Charges Total Minutes 35  -PG       Total Minutes 45  -PG            User Key  (r) = Recorded By, (t) = Taken By, (c) = Cosigned By    Initials Name Provider Type    PG Raudel Bartlett OT Occupational Therapist              Therapy Charges for Today     Code Description Service Date Service Provider Modifiers Qty    [id number]  OT THERAPEUTIC ACT EA 15 MIN 12/23/2022 Raudel Bartlett OT GO 1    [id number]  OT EVAL LOW  COMPLEXITY 3 12/23/2022 Raudel Bartlett, OT GO 1               Raudel Bartlett, OT  12/23/2022

## 2022-12-23 NOTE — PLAN OF CARE
Goal Outcome Evaluation:      New admit from ED this shift. Bath given upon arrival, photos taken of rash in left groin fold, would consult placed. Will continue to monitor.

## 2022-12-23 NOTE — CONSULTS
Discharge Planning Assessment   Rodriguez     Patient Name: Lona Rodríguez  MRN: [id number]  Today's Date: 12/23/2022    Admit Date: 12/22/2022    Plan: RL completed assessment with pt. Pt lives alone and reports having neighbors that can assist when available. Pt confirmed PCP and pharmacy. Pt reports \"my house is a mess.\" Pt has dogs, but the neighbors are caring for dogs while pt is in the hospital. Pt uses neighbor, insurance transport, or TACK for transportation. SW discussed discharge planning and rehab placement. Pt is agreeable to local referrals being made. Prefers [place]. PT/OT pending.   Discharge Needs Assessment     Row Name 12/23/22 1052       Living Environment    People in Home alone    Current Living Arrangements home    Primary Care Provided by self    Provides Primary Care For no one, unable/limited ability to care for self    Family Caregiver if Needed friend(s)       Resource/Environmental Concerns    Resource/Environmental Concerns home accessibility       Transition Planning    Patient/Family Anticipates Transition to inpatient rehabilitation facility    Patient/Family Anticipated Services at Transition skilled nursing;rehabilitation services    Transportation Anticipated public transportation;family or friend will provide;health plan transportation       Discharge Needs Assessment    Readmission Within the Last 30 Days no previous admission in last 30 days    Equipment Currently Used at Home rollator;shower chair    Equipment Needed After Discharge none    Discharge Facility/Level of Care Needs nursing facility, skilled;rehabilitation facility               Discharge Plan     Row Name 12/23/22 1055       Plan    Plan RL completed assessment with pt. Pt lives alone and reports having neighbors that can assist when available. Pt confirmed PCP and pharmacy. Pt reports \"my house is a mess.\" Pt has dogs, but the neighbors are caring for dogs while pt is in the hospital. Pt uses neighbor,  insurance transport, or TACK for transportation. SW discussed discharge planning and rehab placement. Pt is agreeable to local referrals being made. Prefers [place]. PT/OT pending.              Continued Care and Services - Admitted Since 12/22/2022     Destination     Service Provider Request Status Selected Services Address Phone Fax Patient Preferred    [facility] - Request Sent N/A [address] [place] [address] [phone] --    [facility] Pending - Request Sent N/A [address] [place] [id number] [phone] --    [facility] Pending - Request Sent N/A [address] [place] [address] [phone] --    [facility] Pending - Request Sent N/A [address] [place] [address] [phone] --    [facility] [place] Pending - Request Sent N/A [address] [place] [address] [phone] --    [facility] Pending - Request Sent N/A [address] [place] [address] [phone] --    [facility] Pending - Request Sent N/A [address] [place] [address] [phone]     [facility] Pending - Request Sent N/A [address] [place] [address] [phone] --                 Demographic Summary     Row Name 12/23/22 1051       General Information    Admission Type observation    Arrived From emergency department    Reason for Consult discharge planning    Preferred Language English               Functional Status    No documentation.                Psychosocial    No documentation.                Abuse/Neglect    No documentation.                Legal    No documentation.                 Substance Abuse    No documentation.                Patient Forms    No documentation.                   NONA Little

## 2022-12-24 LAB
ALBUMIN SERPL-MCNC: 3.4 G/DL (ref 3.5–5.2)
ALBUMIN/GLOB SERPL: 1.4 G/DL
ALP SERPL-CCNC: 76 U/L (ref 39–117)
ALT SERPL W P-5'-P-CCNC: 12 U/L (ref 1–33)
ANION GAP SERPL CALCULATED.3IONS-SCNC: 10.3 MMOL/L (ref 5–15)
AST SERPL-CCNC: 25 U/L (ref 1–32)
BILIRUB SERPL-MCNC: 0.5 MG/DL (ref 0–1.2)
BUN SERPL-MCNC: 13 MG/DL (ref 8–23)
BUN/CREAT SERPL: 18.8 (ref 7–25)
CALCIUM SPEC-SCNC: 8.8 MG/DL (ref 8.6–10.5)
CHLORIDE SERPL-SCNC: 100 MMOL/L (ref 98–107)
CO2 SERPL-SCNC: 29.7 MMOL/L (ref 22–29)
CREAT SERPL-MCNC: 0.69 MG/DL (ref 0.57–1)
EGFRCR SERPLBLD CKD-EPI 2021: 92.3 ML/MIN/1.73
GLOBULIN UR ELPH-MCNC: 2.5 GM/DL
GLUCOSE SERPL-MCNC: 122 MG/DL (ref 65–99)
POTASSIUM SERPL-SCNC: 3.8 MMOL/L (ref 3.5–5.2)
PROT SERPL-MCNC: 5.9 G/DL (ref 6–8.5)
SODIUM SERPL-SCNC: 140 MMOL/L (ref 136–145)
WHOLE BLOOD HOLD SPECIMEN: NORMAL

## 2022-12-24 PROCEDURE — 94664 DEMO&/EVAL PT USE INHALER: CPT

## 2022-12-24 PROCEDURE — 25010000002 HYDROMORPHONE 1 MG/ML SOLUTION: Performed by: INTERNAL MEDICINE

## 2022-12-24 PROCEDURE — 25010000002 KETOROLAC TROMETHAMINE PER 15 MG: Performed by: INTERNAL MEDICINE

## 2022-12-24 PROCEDURE — 25010000002 ENOXAPARIN PER 10 MG: Performed by: INTERNAL MEDICINE

## 2022-12-24 PROCEDURE — 94799 UNLISTED PULMONARY SVC/PX: CPT

## 2022-12-24 PROCEDURE — 80053 COMPREHEN METABOLIC PANEL: CPT | Performed by: INTERNAL MEDICINE

## 2022-12-24 PROCEDURE — 94761 N-INVAS EAR/PLS OXIMETRY MLT: CPT

## 2022-12-24 PROCEDURE — G0378 HOSPITAL OBSERVATION PER HR: HCPCS

## 2022-12-24 PROCEDURE — 25010000002 FUROSEMIDE PER 20 MG: Performed by: INTERNAL MEDICINE

## 2022-12-24 RX ADMIN — HYDROMORPHONE HYDROCHLORIDE 0.5 MG: 1 INJECTION, SOLUTION INTRAMUSCULAR; INTRAVENOUS; SUBCUTANEOUS at 04:50

## 2022-12-24 RX ADMIN — ENOXAPARIN SODIUM 60 MG: 100 INJECTION SUBCUTANEOUS at 08:23

## 2022-12-24 RX ADMIN — IPRATROPIUM BROMIDE AND ALBUTEROL SULFATE 3 ML: .5; 3 SOLUTION RESPIRATORY (INHALATION) at 15:52

## 2022-12-24 RX ADMIN — ENOXAPARIN SODIUM 60 MG: 100 INJECTION SUBCUTANEOUS at 21:48

## 2022-12-24 RX ADMIN — IPRATROPIUM BROMIDE AND ALBUTEROL SULFATE 3 ML: .5; 3 SOLUTION RESPIRATORY (INHALATION) at 18:41

## 2022-12-24 RX ADMIN — AMITRIPTYLINE HYDROCHLORIDE 50 MG: 50 TABLET, FILM COATED ORAL at 21:49

## 2022-12-24 RX ADMIN — KETOROLAC TROMETHAMINE 30 MG: 30 INJECTION, SOLUTION INTRAMUSCULAR; INTRAVENOUS at 07:26

## 2022-12-24 RX ADMIN — FUROSEMIDE 20 MG: 10 INJECTION, SOLUTION INTRAMUSCULAR; INTRAVENOUS at 13:05

## 2022-12-24 RX ADMIN — IPRATROPIUM BROMIDE AND ALBUTEROL SULFATE 3 ML: .5; 3 SOLUTION RESPIRATORY (INHALATION) at 08:19

## 2022-12-24 RX ADMIN — IPRATROPIUM BROMIDE AND ALBUTEROL SULFATE 3 ML: .5; 3 SOLUTION RESPIRATORY (INHALATION) at 03:31

## 2022-12-24 RX ADMIN — FUROSEMIDE 20 MG: 10 INJECTION, SOLUTION INTRAMUSCULAR; INTRAVENOUS at 00:31

## 2022-12-24 RX ADMIN — BENZONATATE 100 MG: 100 CAPSULE ORAL at 18:03

## 2022-12-24 RX ADMIN — HYDROMORPHONE HYDROCHLORIDE 0.5 MG: 1 INJECTION, SOLUTION INTRAMUSCULAR; INTRAVENOUS; SUBCUTANEOUS at 08:54

## 2022-12-24 RX ADMIN — MONTELUKAST 10 MG: 10 TABLET, FILM COATED ORAL at 21:49

## 2022-12-24 RX ADMIN — HYDROMORPHONE HYDROCHLORIDE 0.5 MG: 1 INJECTION, SOLUTION INTRAMUSCULAR; INTRAVENOUS; SUBCUTANEOUS at 13:02

## 2022-12-24 RX ADMIN — HYDROMORPHONE HYDROCHLORIDE 0.5 MG: 1 INJECTION, SOLUTION INTRAMUSCULAR; INTRAVENOUS; SUBCUTANEOUS at 21:52

## 2022-12-24 RX ADMIN — IPRATROPIUM BROMIDE AND ALBUTEROL SULFATE 3 ML: .5; 3 SOLUTION RESPIRATORY (INHALATION) at 12:11

## 2022-12-24 RX ADMIN — KETOROLAC TROMETHAMINE 30 MG: 30 INJECTION, SOLUTION INTRAMUSCULAR; INTRAVENOUS at 18:02

## 2022-12-24 RX ADMIN — MICONAZOLE NITRATE 1 APPLICATION: 20 CREAM TOPICAL at 21:49

## 2022-12-24 RX ADMIN — HYDROMORPHONE HYDROCHLORIDE 0.5 MG: 1 INJECTION, SOLUTION INTRAMUSCULAR; INTRAVENOUS; SUBCUTANEOUS at 02:45

## 2022-12-24 RX ADMIN — HYDROMORPHONE HYDROCHLORIDE 0.5 MG: 1 INJECTION, SOLUTION INTRAMUSCULAR; INTRAVENOUS; SUBCUTANEOUS at 16:21

## 2022-12-24 RX ADMIN — BENZONATATE 100 MG: 100 CAPSULE ORAL at 04:35

## 2022-12-24 RX ADMIN — ATORVASTATIN CALCIUM 20 MG: 20 TABLET, FILM COATED ORAL at 21:49

## 2022-12-24 NOTE — PLAN OF CARE
Goal Outcome Evaluation:      Pt remains A&O x4, rm air, NSR, +2 LE, still requiring dilaudid Q2 for broken rib fracture, pt states very painful while coughing. Pt has gotten up to bedside commode 3 times to urinate, diuresis pt. Pt requested telson pearls to diminish cough frequency so they can get some rest tonight.  Pt is progressing well.

## 2022-12-24 NOTE — PROGRESS NOTES
[facility]     Progress Note    Patient Name: Lona Rodríguez  : 1950  MRN: [id number]  Primary Care Physician:  Christopher Thao MD  Date of admission: 2022    Subjective   Subjective     Chief Complaint: Fall with rib fractures and distal radius fracture right    HPI:  Patient Reports she has fractures of her ribs and right arm.  Dr. Kennedy has placed a short arm cast with molding on right arm yesterday.  She is morbidly obese and uses assistive device to walk.   Review of Systems   Review of Systems   Constitutional: Activity change: in bed.   HENT: Negative.    Eyes: Negative.    Respiratory: Negative.    Cardiovascular: Chest pain: rib fractures.   Gastrointestinal: Negative.    Endocrine: Negative.    Genitourinary: Negative.    Musculoskeletal: Positive for arthralgias.   Skin: Negative.    Allergic/Immunologic: Negative.    Neurological: Negative.    Hematological: Negative.    Psychiatric/Behavioral: Negative.        Objective   Objective     Vitals:   Temp:  [97.4 °F (36.3 °C)-98.3 °F (36.8 °C)] 97.9 °F (36.6 °C)  Heart Rate:  [88-97] 88  Resp:  [18-20] 18  BP: (125-150)/(62-84) 125/74  Physical Exam    Constitutional: Awake, alert   Eyes: PERRLA, sclerae anicteric, no conjunctival injection   HENT: NCAT, mucous membranes moist   Neck: Supple, no thyromegaly, no lymphadenopathy, trachea midline   Respiratory: Clear to auscultation bilaterally, nonlabored respirations    Cardiovascular: RRR, no murmurs, rubs, or gallops, palpable pedal pulses bilaterally   Gastrointestinal: Positive bowel sounds, soft, nontender, nondistended   Musculoskeletal: No bilateral ankle edema, no clubbing or cyanosis to extremities   Psychiatric: Appropriate affect, cooperative   Neurologic: Oriented x 3, strength symmetric in all extremities, Cranial Nerves grossly intact to confrontation, speech clear   Skin: No rashes     Result Review    Result Review:  I have personally reviewed the results from the time of  this admission to 12/24/2022 12:07 EST and agree with these findings:  [x]  Laboratory  []  Microbiology  []  Radiology  []  EKG/Telemetry   []  Cardiology/Vascular   []  Pathology  []  Old records  []  Other:  Most notable findings include: Post right wrist fracture and rib fractures after fall at home  Assessment & Plan   Assessment / Plan     Brief Patient Summary:  Lona Rodríguez is a 72 y.o. female who has fallen at home and being treated for right radius fracture and rib fractures.     Active Hospital Problems:  Active Hospital Problems    Diagnosis    • **Intractable pain    • Wrist fracture    • Rib fractures    • Lung nodule    • Dyspnea      Plan:   Has 2.4 cm lung nodule, PET scan as outpatient  Social service for inpatient rehab  On pain meds Lasix nebs    DVT prophylaxis:  Medical DVT prophylaxis orders are present.    CODE STATUS:   Code Status (Patient has no pulse and is not breathing): CPR (Attempt to Resuscitate)  Medical Interventions (Patient has pulse or is breathing): Full Support  Release to patient: Routine Release      Electronically signed by Jazlyn Luna MD, 12/24/22, 12:07 PM EST.

## 2022-12-24 NOTE — PLAN OF CARE
Goal Outcome Evaluation:      VSS. Pain control this shift. Multiple voids, x2 to BSC. Will continue to monitor.

## 2022-12-25 PROBLEM — S22.39XA CLOSED FRACTURE OF ONE RIB, UNSPECIFIED LATERALITY, INITIAL ENCOUNTER: Status: ACTIVE | Noted: 2022-12-25

## 2022-12-25 PROCEDURE — 94761 N-INVAS EAR/PLS OXIMETRY MLT: CPT

## 2022-12-25 PROCEDURE — 94799 UNLISTED PULMONARY SVC/PX: CPT

## 2022-12-25 PROCEDURE — 25010000002 ENOXAPARIN PER 10 MG: Performed by: INTERNAL MEDICINE

## 2022-12-25 PROCEDURE — 25010000002 KETOROLAC TROMETHAMINE PER 15 MG: Performed by: INTERNAL MEDICINE

## 2022-12-25 PROCEDURE — 25010000002 HYDROMORPHONE 1 MG/ML SOLUTION: Performed by: INTERNAL MEDICINE

## 2022-12-25 PROCEDURE — 25010000002 FUROSEMIDE PER 20 MG: Performed by: INTERNAL MEDICINE

## 2022-12-25 RX ORDER — HYDROCODONE BITARTRATE AND ACETAMINOPHEN 5; 325 MG/1; MG/1
1 TABLET ORAL EVERY 6 HOURS PRN
Status: DISCONTINUED | OUTPATIENT
Start: 2022-12-25 | End: 2022-12-26 | Stop reason: SDUPTHER

## 2022-12-25 RX ADMIN — HYDROMORPHONE HYDROCHLORIDE 0.5 MG: 1 INJECTION, SOLUTION INTRAMUSCULAR; INTRAVENOUS; SUBCUTANEOUS at 11:42

## 2022-12-25 RX ADMIN — BENZONATATE 100 MG: 100 CAPSULE ORAL at 17:18

## 2022-12-25 RX ADMIN — IPRATROPIUM BROMIDE AND ALBUTEROL SULFATE 3 ML: .5; 3 SOLUTION RESPIRATORY (INHALATION) at 00:08

## 2022-12-25 RX ADMIN — MICONAZOLE NITRATE 1 APPLICATION: 20 CREAM TOPICAL at 20:05

## 2022-12-25 RX ADMIN — HYDROMORPHONE HYDROCHLORIDE 0.5 MG: 1 INJECTION, SOLUTION INTRAMUSCULAR; INTRAVENOUS; SUBCUTANEOUS at 14:55

## 2022-12-25 RX ADMIN — IPRATROPIUM BROMIDE AND ALBUTEROL SULFATE 3 ML: .5; 3 SOLUTION RESPIRATORY (INHALATION) at 15:49

## 2022-12-25 RX ADMIN — IPRATROPIUM BROMIDE AND ALBUTEROL SULFATE 3 ML: .5; 3 SOLUTION RESPIRATORY (INHALATION) at 06:48

## 2022-12-25 RX ADMIN — HYDROMORPHONE HYDROCHLORIDE 0.5 MG: 1 INJECTION, SOLUTION INTRAMUSCULAR; INTRAVENOUS; SUBCUTANEOUS at 00:44

## 2022-12-25 RX ADMIN — HYDROCODONE BITARTRATE AND ACETAMINOPHEN 1 TABLET: 5; 325 TABLET ORAL at 19:17

## 2022-12-25 RX ADMIN — ATORVASTATIN CALCIUM 20 MG: 20 TABLET, FILM COATED ORAL at 20:06

## 2022-12-25 RX ADMIN — BENZONATATE 100 MG: 100 CAPSULE ORAL at 09:08

## 2022-12-25 RX ADMIN — MICONAZOLE NITRATE 1 APPLICATION: 20 CREAM TOPICAL at 08:36

## 2022-12-25 RX ADMIN — HYDROMORPHONE HYDROCHLORIDE 0.5 MG: 1 INJECTION, SOLUTION INTRAMUSCULAR; INTRAVENOUS; SUBCUTANEOUS at 04:30

## 2022-12-25 RX ADMIN — AMITRIPTYLINE HYDROCHLORIDE 50 MG: 50 TABLET, FILM COATED ORAL at 20:06

## 2022-12-25 RX ADMIN — ENOXAPARIN SODIUM 60 MG: 100 INJECTION SUBCUTANEOUS at 20:04

## 2022-12-25 RX ADMIN — HYDROMORPHONE HYDROCHLORIDE 0.5 MG: 1 INJECTION, SOLUTION INTRAMUSCULAR; INTRAVENOUS; SUBCUTANEOUS at 23:41

## 2022-12-25 RX ADMIN — HYDROMORPHONE HYDROCHLORIDE 0.5 MG: 1 INJECTION, SOLUTION INTRAMUSCULAR; INTRAVENOUS; SUBCUTANEOUS at 08:36

## 2022-12-25 RX ADMIN — FUROSEMIDE 20 MG: 10 INJECTION, SOLUTION INTRAMUSCULAR; INTRAVENOUS at 00:44

## 2022-12-25 RX ADMIN — IPRATROPIUM BROMIDE AND ALBUTEROL SULFATE 3 ML: .5; 3 SOLUTION RESPIRATORY (INHALATION) at 03:13

## 2022-12-25 RX ADMIN — IPRATROPIUM BROMIDE AND ALBUTEROL SULFATE 3 ML: .5; 3 SOLUTION RESPIRATORY (INHALATION) at 19:00

## 2022-12-25 RX ADMIN — FUROSEMIDE 20 MG: 10 INJECTION, SOLUTION INTRAMUSCULAR; INTRAVENOUS at 20:06

## 2022-12-25 RX ADMIN — IPRATROPIUM BROMIDE AND ALBUTEROL SULFATE 3 ML: .5; 3 SOLUTION RESPIRATORY (INHALATION) at 23:42

## 2022-12-25 RX ADMIN — ENOXAPARIN SODIUM 60 MG: 100 INJECTION SUBCUTANEOUS at 08:35

## 2022-12-25 RX ADMIN — KETOROLAC TROMETHAMINE 30 MG: 30 INJECTION, SOLUTION INTRAMUSCULAR; INTRAVENOUS at 13:33

## 2022-12-25 RX ADMIN — MONTELUKAST 10 MG: 10 TABLET, FILM COATED ORAL at 20:06

## 2022-12-25 RX ADMIN — IPRATROPIUM BROMIDE AND ALBUTEROL SULFATE 3 ML: .5; 3 SOLUTION RESPIRATORY (INHALATION) at 11:27

## 2022-12-25 NOTE — PROGRESS NOTES
[facility]     Progress Note    Patient Name: Lona Rodríguez  : 1950  MRN: [id number]  Primary Care Physician:  Christopher Thao MD  Date of admission: 2022    Subjective   Subjective     Chief Complaint: Fall with rib fractures and distal radius fracture right  HPI:  Patient Reports she is taking IV pain medication.  Encouraged her to use p.o. medications which will last longer.  Also worried about her rehab bed    Review of Systems   Review of Systems   Constitutional: Activity change: up in chair.   HENT: Negative.    Eyes: Negative.    Respiratory: Negative.    Gastrointestinal: Negative.    Endocrine: Negative.    Genitourinary: Negative.    Musculoskeletal: Joint swelling: rib pain on coughing.   Skin: Negative.    Neurological: Negative.    Hematological: Negative.    Psychiatric/Behavioral: Negative.        Objective   Objective     Vitals:   Temp:  [97.3 °F (36.3 °C)-98.3 °F (36.8 °C)] 97.9 °F (36.6 °C)  Heart Rate:  [85-97] 89  Resp:  [16-20] 18  BP: (105-133)/(52-64) 122/53  Flow (L/min):  [1.5-2] 1.5  Physical Exam    Constitutional: Awake, alert   Eyes: PERRLA, sclerae anicteric, no conjunctival injection   HENT: NCAT, mucous membranes moist   Neck: Supple, no thyromegaly, no lymphadenopathy, trachea midline   Respiratory: Clear to auscultation bilaterally, nonlabored respirations    Cardiovascular: RRR, no murmurs, rubs, or gallops, palpable pedal pulses bilaterally   Gastrointestinal: Positive bowel sounds, soft, nontender, nondistended   Musculoskeletal: No bilateral ankle edema, no clubbing or cyanosis to extremities   Psychiatric: Appropriate affect, cooperative   Neurologic: Oriented x 3, strength symmetric in all extremities, Cranial Nerves grossly intact to confrontation, speech clear   Skin: No rashes     Result Review    Result Review:  I have personally reviewed the results from the time of this admission to 2022 15:04 EST and agree with these findings:  [x]   Laboratory  []  Microbiology  []  Radiology  []  EKG/Telemetry   []  Cardiology/Vascular   []  Pathology  []  Old records  []  Other:  Most notable findings include: Comp profile within normal range  Assessment & Plan   Assessment / Plan     Brief Patient Summary:  Lona Rodríguez is a 72 y.o. female who has been having pain in her ribs    Active Hospital Problems:  Active Hospital Problems    Diagnosis    • **Intractable pain    • Wrist fracture    • Rib fractures    • Lung nodule    • Dyspnea      Plan:   Encouraged her to use oral pain medicine  We will switch to inpatient  Nurse to talk to social service  DVT prophylaxis:  Medical DVT prophylaxis orders are present.    CODE STATUS:   Code Status (Patient has no pulse and is not breathing): CPR (Attempt to Resuscitate)  Medical Interventions (Patient has pulse or is breathing): Full Support  Release to patient: Routine Release      Electronically signed by Jazlyn Luna MD, 12/25/22, 3:04 PM EST.

## 2022-12-25 NOTE — PLAN OF CARE
Problem: Fall Injury Risk  Goal: Absence of Fall and Fall-Related Injury  Outcome: Ongoing, Progressing  Intervention: Promote Injury-Free Environment  Recent Flowsheet Documentation  Taken 12/25/2022 1726 by Gia Schaffer RN  Safety Promotion/Fall Prevention: safety round/check completed     Problem: Pain Acute  Goal: Acceptable Pain Control and Functional Ability  Outcome: Ongoing, Progressing  Intervention: Develop Pain Management Plan  Recent Flowsheet Documentation  Taken 12/25/2022 1455 by Gia Schaffer RN  Pain Management Interventions: see MAR     Problem: COPD (Chronic Obstructive Pulmonary Disease) Comorbidity  Goal: Maintenance of COPD Symptom Control  Outcome: Ongoing, Progressing     Problem: Hypertension Comorbidity  Goal: Blood Pressure in Desired Range  Outcome: Ongoing, Progressing     Problem: Pain Chronic (Persistent) (Comorbidity Management)  Goal: Acceptable Pain Control and Functional Ability  Outcome: Ongoing, Progressing  Intervention: Develop Pain Management Plan  Recent Flowsheet Documentation  Taken 12/25/2022 1455 by Gia Schaffer RN  Pain Management Interventions: see MAR     Problem: Adult Inpatient Plan of Care  Goal: Plan of Care Review  Outcome: Ongoing, Progressing  Goal: Patient-Specific Goal (Individualized)  Outcome: Ongoing, Progressing  Goal: Absence of Hospital-Acquired Illness or Injury  Outcome: Ongoing, Progressing  Intervention: Identify and Manage Fall Risk  Recent Flowsheet Documentation  Taken 12/25/2022 1726 by Gia Schaffer RN  Safety Promotion/Fall Prevention: safety round/check completed  Intervention: Prevent and Manage VTE (Venous Thromboembolism) Risk  Recent Flowsheet Documentation  Taken 12/25/2022 1726 by Gia Schaffer RN  Activity Management: up to bedside commode  Goal: Optimal Comfort and Wellbeing  Outcome: Ongoing, Progressing  Intervention: Monitor Pain and Promote Comfort  Recent Flowsheet Documentation  Taken 12/25/2022 1455 by Karime  Gia PINEDA RN  Pain Management Interventions: see MAR  Goal: Readiness for Transition of Care  Outcome: Ongoing, Progressing     Problem: Skin Injury Risk Increased  Goal: Skin Health and Integrity  Outcome: Ongoing, Progressing

## 2022-12-26 ENCOUNTER — APPOINTMENT (OUTPATIENT)
Dept: GENERAL RADIOLOGY | Facility: HOSPITAL | Age: 72
DRG: 184 | End: 2022-12-26
Payer: MEDICARE

## 2022-12-26 PROCEDURE — 25010000002 HYDROMORPHONE 1 MG/ML SOLUTION: Performed by: INTERNAL MEDICINE

## 2022-12-26 PROCEDURE — 25010000002 FUROSEMIDE PER 20 MG: Performed by: INTERNAL MEDICINE

## 2022-12-26 PROCEDURE — 73110 X-RAY EXAM OF WRIST: CPT

## 2022-12-26 PROCEDURE — 25010000002 KETOROLAC TROMETHAMINE PER 15 MG: Performed by: INTERNAL MEDICINE

## 2022-12-26 PROCEDURE — 94799 UNLISTED PULMONARY SVC/PX: CPT

## 2022-12-26 PROCEDURE — 25010000002 ENOXAPARIN PER 10 MG: Performed by: INTERNAL MEDICINE

## 2022-12-26 PROCEDURE — 94761 N-INVAS EAR/PLS OXIMETRY MLT: CPT

## 2022-12-26 RX ORDER — SODIUM PHOSPHATE,MONO-DIBASIC 19G-7G/118
1 ENEMA (ML) RECTAL ONCE AS NEEDED
Status: COMPLETED | OUTPATIENT
Start: 2022-12-26 | End: 2022-12-27

## 2022-12-26 RX ORDER — HYDROCODONE BITARTRATE AND ACETAMINOPHEN 10; 325 MG/1; MG/1
1 TABLET ORAL EVERY 4 HOURS PRN
Qty: 150 TABLET | Refills: 0 | Status: SHIPPED | OUTPATIENT
Start: 2022-12-26 | End: 2023-01-25

## 2022-12-26 RX ORDER — FUROSEMIDE 20 MG/1
20 TABLET ORAL 2 TIMES DAILY
Qty: 60 TABLET | Refills: 0 | Status: SHIPPED | OUTPATIENT
Start: 2022-12-26 | End: 2023-01-25

## 2022-12-26 RX ORDER — POLYETHYLENE GLYCOL 3350 17 G/17G
17 POWDER, FOR SOLUTION ORAL DAILY
Status: DISCONTINUED | OUTPATIENT
Start: 2022-12-26 | End: 2022-12-27 | Stop reason: HOSPADM

## 2022-12-26 RX ORDER — HYDROCODONE BITARTRATE AND ACETAMINOPHEN 10; 325 MG/1; MG/1
1 TABLET ORAL EVERY 4 HOURS PRN
Status: DISCONTINUED | OUTPATIENT
Start: 2022-12-26 | End: 2022-12-27 | Stop reason: HOSPADM

## 2022-12-26 RX ORDER — BENZONATATE 100 MG/1
100 CAPSULE ORAL 3 TIMES DAILY PRN
Qty: 90 CAPSULE | Refills: 0 | Status: SHIPPED | OUTPATIENT
Start: 2022-12-26 | End: 2023-01-25

## 2022-12-26 RX ORDER — IPRATROPIUM BROMIDE AND ALBUTEROL SULFATE 2.5; .5 MG/3ML; MG/3ML
3 SOLUTION RESPIRATORY (INHALATION)
Qty: 540 ML | Refills: 0 | Status: SHIPPED | OUTPATIENT
Start: 2022-12-26 | End: 2023-01-25

## 2022-12-26 RX ORDER — ENOXAPARIN SODIUM 100 MG/ML
60 INJECTION SUBCUTANEOUS EVERY 12 HOURS SCHEDULED
Qty: 36 ML | Refills: 0 | Status: SHIPPED | OUTPATIENT
Start: 2022-12-26 | End: 2023-01-25

## 2022-12-26 RX ORDER — LACTULOSE 10 G/15ML
30 SOLUTION ORAL ONCE
Status: COMPLETED | OUTPATIENT
Start: 2022-12-26 | End: 2022-12-26

## 2022-12-26 RX ORDER — POLYETHYLENE GLYCOL 3350 17 G/17G
17 POWDER, FOR SOLUTION ORAL DAILY
Qty: 30 PACKET | Refills: 0 | Status: SHIPPED | OUTPATIENT
Start: 2022-12-27 | End: 2023-01-26

## 2022-12-26 RX ORDER — GABAPENTIN 300 MG/1
300 CAPSULE ORAL 3 TIMES DAILY
Qty: 90 CAPSULE | Refills: 0 | Status: SHIPPED | OUTPATIENT
Start: 2022-12-26 | End: 2023-01-25

## 2022-12-26 RX ADMIN — HYDROMORPHONE HYDROCHLORIDE 0.5 MG: 1 INJECTION, SOLUTION INTRAMUSCULAR; INTRAVENOUS; SUBCUTANEOUS at 09:38

## 2022-12-26 RX ADMIN — IPRATROPIUM BROMIDE AND ALBUTEROL SULFATE 3 ML: .5; 3 SOLUTION RESPIRATORY (INHALATION) at 18:58

## 2022-12-26 RX ADMIN — FUROSEMIDE 20 MG: 10 INJECTION, SOLUTION INTRAMUSCULAR; INTRAVENOUS at 08:09

## 2022-12-26 RX ADMIN — BENZONATATE 100 MG: 100 CAPSULE ORAL at 01:13

## 2022-12-26 RX ADMIN — HYDROCODONE BITARTRATE AND ACETAMINOPHEN 1 TABLET: 5; 325 TABLET ORAL at 07:33

## 2022-12-26 RX ADMIN — IPRATROPIUM BROMIDE AND ALBUTEROL SULFATE 3 ML: .5; 3 SOLUTION RESPIRATORY (INHALATION) at 11:48

## 2022-12-26 RX ADMIN — HYDROCODONE BITARTRATE AND ACETAMINOPHEN 1 TABLET: 10; 325 TABLET ORAL at 22:52

## 2022-12-26 RX ADMIN — POLYETHYLENE GLYCOL 3350 17 G: 17 POWDER, FOR SOLUTION ORAL at 14:31

## 2022-12-26 RX ADMIN — MICONAZOLE NITRATE 1 APPLICATION: 20 CREAM TOPICAL at 20:28

## 2022-12-26 RX ADMIN — ENOXAPARIN SODIUM 60 MG: 100 INJECTION SUBCUTANEOUS at 08:10

## 2022-12-26 RX ADMIN — HYDROCODONE BITARTRATE AND ACETAMINOPHEN 1 TABLET: 10; 325 TABLET ORAL at 14:31

## 2022-12-26 RX ADMIN — MONTELUKAST 10 MG: 10 TABLET, FILM COATED ORAL at 20:27

## 2022-12-26 RX ADMIN — BENZONATATE 100 MG: 100 CAPSULE ORAL at 22:51

## 2022-12-26 RX ADMIN — IPRATROPIUM BROMIDE AND ALBUTEROL SULFATE 3 ML: .5; 3 SOLUTION RESPIRATORY (INHALATION) at 03:19

## 2022-12-26 RX ADMIN — HYDROCODONE BITARTRATE AND ACETAMINOPHEN 1 TABLET: 10; 325 TABLET ORAL at 18:48

## 2022-12-26 RX ADMIN — FUROSEMIDE 20 MG: 10 INJECTION, SOLUTION INTRAMUSCULAR; INTRAVENOUS at 20:26

## 2022-12-26 RX ADMIN — ENOXAPARIN SODIUM 60 MG: 100 INJECTION SUBCUTANEOUS at 21:17

## 2022-12-26 RX ADMIN — IPRATROPIUM BROMIDE AND ALBUTEROL SULFATE 3 ML: .5; 3 SOLUTION RESPIRATORY (INHALATION) at 07:01

## 2022-12-26 RX ADMIN — MICONAZOLE NITRATE 1 APPLICATION: 20 CREAM TOPICAL at 08:10

## 2022-12-26 RX ADMIN — HYDROCODONE BITARTRATE AND ACETAMINOPHEN 1 TABLET: 5; 325 TABLET ORAL at 01:13

## 2022-12-26 RX ADMIN — AMITRIPTYLINE HYDROCHLORIDE 50 MG: 50 TABLET, FILM COATED ORAL at 20:27

## 2022-12-26 RX ADMIN — BENZONATATE 100 MG: 100 CAPSULE ORAL at 14:34

## 2022-12-26 RX ADMIN — KETOROLAC TROMETHAMINE 30 MG: 30 INJECTION, SOLUTION INTRAMUSCULAR; INTRAVENOUS at 20:25

## 2022-12-26 RX ADMIN — IPRATROPIUM BROMIDE AND ALBUTEROL SULFATE 3 ML: .5; 3 SOLUTION RESPIRATORY (INHALATION) at 16:28

## 2022-12-26 RX ADMIN — LACTULOSE 30 ML: 20 SOLUTION ORAL at 20:24

## 2022-12-26 RX ADMIN — ATORVASTATIN CALCIUM 20 MG: 20 TABLET, FILM COATED ORAL at 20:27

## 2022-12-26 NOTE — PLAN OF CARE
Goal Outcome Evaluation:  Plan of Care Reviewed With: patient           Outcome Evaluation: patient resting in bed, patient c/o pain in right side rib area when coughing or with movement, patient is alert and oriented, call light within reach.

## 2022-12-26 NOTE — DISCHARGE SUMMARY
[facility]         DISCHARGE SUMMARY    Patient Name: Lona Rodríguez  : 1950  MRN: [id number]    Date of Admission: 2022  Date of Discharge: 2022  Primary Care Physician: Christopher Thao MD    Consults     Date and Time Order Name Status Description    2022 12:02 PM Inpatient Orthopedic Surgery Consult Completed           Presenting Problem:   Intractable pain [R52]  Closed fracture of one rib, unspecified laterality, initial encounter [S22.39XA]    Active and Resolved Hospital Problems:  Active Hospital Problems    Diagnosis POA   • **Intractable pain [R52] Yes     Priority: Medium   • Closed fracture of one rib, unspecified laterality, initial encounter [S22.39XA] Yes     Priority: Medium   • Wrist fracture [S62.109A] Yes     Priority: Medium   • Rib fractures [S22.49XA] Yes     Priority: Medium   • Lung nodule [R91.1] Yes     Priority: Medium   • Dyspnea [R06.00] Yes     Priority: Medium      Resolved Hospital Problems   No resolved problems to display.         Hospital Course     Hospital Course:  Loan Rodríguez is a 72 y.o. female was admitted after a fall at home.  She had come the ER and was treated and sent home.  Because of pain in her right ribs came back again and this time x-rays done showed right rib fractures and a right wrist fracture.  She is moderately obese and uses assistive device to walk.  Because of the fracture of the wrist she was admitted.  Dr. Kennedy was asked to see her and right wrist was placed in a short arm cast with molding.  She was given IV pain medications for her rib pains especially after coughing.  She was also started on Tessalon Perles.  She was encouraged to go on p.o. pain medications hydrocodone 10 mg.   back after holiday weekend and she is being sent to [facility] for rehab now.  She was discharged on 2022.      DISCHARGE Follow Up Recommendations for labs and diagnostics: In PMDs  office      Day of Discharge     Vital Signs:  Temp:  [97.5 °F (36.4 °C)-98.4 °F (36.9 °C)] 98.4 °F (36.9 °C)  Heart Rate:  [78-92] 78  Resp:  [16-20] 18  BP: (106-148)/(47-75) 148/71  Flow (L/min):  [1-1.5] 1  Physical Exam:  Constitutional: Awake, alert   Eyes: PERRLA, sclerae anicteric, no conjunctival injection   HENT: NCAT, mucous membranes moist   Neck: Supple, no thyromegaly, no lymphadenopathy, trachea midline   Respiratory: Clear to auscultation bilaterally, nonlabored respirations    Cardiovascular: RRR, no murmurs, rubs, or gallops, palpable pedal pulses bilaterally   Gastrointestinal: Positive bowel sounds, soft, nontender, nondistended   Musculoskeletal: No bilateral ankle edema, no clubbing or cyanosis to extremities   Psychiatric: Appropriate affect, cooperative   Neurologic: Oriented x 3, strength symmetric in all extremities, Cranial Nerves grossly intact to confrontation, speech clear   Skin: No rashes       Pertinent  and/or Most Recent Results     LAB RESULTS:      Lab 12/22/22 1713 12/22/22 0042   WBC 8.41 13.25*   HEMOGLOBIN 11.2* 11.7*   HEMATOCRIT 35.0 37.2   PLATELETS 356 350   NEUTROS ABS 6.28 11.58*   IMMATURE GRANS (ABS) 0.04 0.05   LYMPHS ABS 0.88 0.62*   MONOS ABS 0.97* 0.93*   EOS ABS 0.18 0.02   MCV 90.2 92.1         Lab 12/24/22 0448 12/22/22 1713 12/22/22 0042   SODIUM 140 137 144   POTASSIUM 3.8 4.0 4.4   CHLORIDE 100 99 105   CO2 29.7* 24.3 26.2   ANION GAP 10.3 13.7 12.8   BUN 13 16 13   CREATININE 0.69 0.71 0.67   EGFR 92.3 90.5 93.0   GLUCOSE 122* 109* 111*   CALCIUM 8.8 9.5 9.5   MAGNESIUM  --   --  1.8         Lab 12/24/22 0448 12/22/22 1713 12/22/22 0042   TOTAL PROTEIN 5.9* 6.6 6.5   ALBUMIN 3.40* 3.90 3.80   GLOBULIN 2.5 2.7 2.7   ALT (SGPT) 12 14 11   AST (SGOT) 25 35* 20   BILIRUBIN 0.5 0.7 0.4   ALK PHOS 76 90 92         Lab 12/23/22  1225 12/22/22  1713 12/22/22  0042   PROBNP 412.1  --   --    TROPONIN T  --  <0.010 <0.010                 Brief Urine Lab  Results  (Last result in the past 365 days)      Color   Clarity   Blood   Leuk Est   Nitrite   Protein   CREAT   Urine HCG        12/22/22 2017 Yellow   Turbid   Moderate (2+)   Moderate (2+)   Negative   Negative               Microbiology Results (last 10 days)     ** No results found for the last 240 hours. **          XR Ribs Right With PA Chest    Result Date: 12/22/2022  Impression:   1. No acute cardiopulmonary abnormality. 2. Mildly displaced fracture of the right lateral 7th rib.     MILAGROS JACINTO MD       Electronically Signed and Approved By: MILAGROS JACINTO MD on 12/22/2022 at 18:07             XR Wrist 3+ View Right    Result Date: 12/26/2022  Impression:  Near anatomic alignment of distal radius fracture.      EMRE GARCIA MD       Electronically Signed and Approved By: EMRE GARCIA MD on 12/26/2022 at 10:37             XR Wrist 3+ View Right    Result Date: 12/22/2022  Impression:  Acute fractures involve the distal right radius and right ulna, as discussed.  No dislocation.     Please note that portions of this note were completed with a voice recognition program.  KRYSTEN CLEMENTE JR, MD       Electronically Signed and Approved By: KRYSTEN CLEMENTE JR, MD on 12/22/2022 at 2:02              CT Head Without Contrast    Result Date: 12/22/2022  Impression:   No acute brain abnormality is seen.  No acute intracranial hemorrhage.  No acute skull fracture.   Please note that portions of this note were completed with a voice recognition program.  KRYSTEN CLEMENTE JR, MD       Electronically Signed and Approved By: KRYSTEN CLEMENTE JR, MD on 12/22/2022 at 3:02              CT Chest With Contrast Diagnostic    Result Date: 12/22/2022  Impression:   1. No findings of pulmonary embolus.  Limited evaluation of subsegmental pulmonary arteries due to respiratory motion.  Enlarged pulmonary arteries, question pulmonary artery hypertension. 2. Chronic appearing volume loss in the left thorax with pleural calcifications likely  related to remote infection or trauma. 3. There is a nonspecific 2.4 cm nodule in the inferior left lower lobe.  PET-CT evaluation is recommended.  If patient has any prior CT studies of the chest for comparison that could also be helpful. 4. There is some geographic ground-glass opacity upper lobe predominant which is nonspecific.  This can be seen with air trapping, early infection or inflammation or pulmonary edema. 5. Cardiomegaly. 6. Moderate to large hiatal hernia. 7. There is an acute mildly displaced fracture of the right lateral 7th rib.     MILAGROS JACINTO MD       Electronically Signed and Approved By: MILAGROS JACINTO MD on 12/22/2022 at 19:59             XR Hip With or Without Pelvis 2 - 3 View Left    Result Date: 12/22/2022  Impression:   The views are limited.  Grossly, no acute fracture or acute malalignment is appreciated.     Please note that portions of this note were completed with a voice recognition program.  KRYSTEN CLEMENTE JR, MD       Electronically Signed and Approved By: KRYSTEN CLEMENTE JR, MD on 12/22/2022 at 4:28                            Labs Pending at Discharge: None        Discharge Details        Discharge Medications      New Medications      Instructions Start Date   benzonatate 100 MG capsule  Commonly known as: TESSALON   100 mg, Oral, 3 Times Daily PRN      Enoxaparin Sodium 60 MG/0.6ML solution prefilled syringe syringe  Commonly known as: LOVENOX   60 mg, Subcutaneous, Every 12 Hours Scheduled      furosemide 20 MG tablet  Commonly known as: Lasix   20 mg, Oral, 2 Times Daily      HYDROcodone-acetaminophen  MG per tablet  Commonly known as: NORCO  Replaces: HYDROcodone-acetaminophen 7.5-325 MG per tablet   1 tablet, Oral, Every 4 Hours PRN      ipratropium-albuterol 0.5-2.5 mg/3 ml nebulizer  Commonly known as: DUO-NEB   3 mL, Nebulization, Every 4 Hours - RT      miconazole 2 % cream  Commonly known as: MICOTIN   1 application, Topical, Every 12 Hours Scheduled       polyethylene glycol 17 g packet  Commonly known as: MIRALAX   17 g, Oral, Daily   Start Date: December 27, 2022        Continue These Medications      Instructions Start Date   amitriptyline 50 MG tablet  Commonly known as: ELAVIL   50 mg, Oral, Nightly      atorvastatin 20 MG tablet  Commonly known as: LIPITOR   20 mg, Oral, Daily      Breztri Aerosphere 160-9-4.8 MCG/ACT aerosol inhaler  Generic drug: Budeson-Glycopyrrol-Formoterol   2 puffs, Inhalation, 2 Times Daily      gabapentin 300 MG capsule  Commonly known as: NEURONTIN   300 mg, Oral, 3 Times Daily      irbesartan 300 MG tablet  Commonly known as: AVAPRO   300 mg, Oral, Nightly      levothyroxine 75 MCG tablet  Commonly known as: SYNTHROID, LEVOTHROID   75 mcg, Oral, Daily      montelukast 10 MG tablet  Commonly known as: SINGULAIR   10 mg, Oral, Nightly      omeprazole 40 MG capsule  Commonly known as: priLOSEC   40 mg, Oral, Daily      vitamin D 1.25 MG (86353 UT) capsule capsule  Commonly known as: ERGOCALCIFEROL   50,000 Units, Oral, 2 Times Weekly         Stop These Medications    acetaminophen-codeine 300-30 MG per tablet  Commonly known as: TYLENOL #3     HYDROcodone-acetaminophen 7.5-325 MG per tablet  Commonly known as: NORCO  Replaced by: HYDROcodone-acetaminophen  MG per tablet            Allergies   Allergen Reactions   • Morphine Anaphylaxis         Discharge Disposition:  Rehab Facility or Unit (DC - External)    Patient Condition on discharge: Stable    Diet:  Hospital:  Diet Order   Procedures   • Diet: Regular/House Diet; Texture: Regular Texture (IDDSI 7); Fluid Consistency: Thin (IDDSI 0)         Discharge Activity: As tolerated        CODE STATUS:  Code Status and Medical Interventions:   Ordered at: 12/23/22 1105     Code Status (Patient has no pulse and is not breathing):    CPR (Attempt to Resuscitate)     Medical Interventions (Patient has pulse or is breathing):    Full Support     Release to patient:    Routine Release          Future Appointments   Date Time Provider Department Center   1/27/2023  3:00 PM YONATAN ALEXIS [facility] 2  YONATAN [facility]WMM [facility]   3/27/2023 10:00 AM NURSE/MA GASTRO ETOWN [facility] GE ETWR [facility]             Electronically signed by Jazlyn Luna MD, 12/26/22, 5:24 PM EST.

## 2022-12-26 NOTE — SIGNIFICANT NOTE
12/26/22 1614   Plan   Plan Pt's pre-cert for [facility] has been approved and expires after tomorrow, 12/27. If pt doesn't admit by tomorrow, pre-cert will need to be restarted.

## 2022-12-27 VITALS
HEART RATE: 86 BPM | RESPIRATION RATE: 20 BRPM | OXYGEN SATURATION: 95 % | HEIGHT: 65 IN | BODY MASS INDEX: 48.82 KG/M2 | TEMPERATURE: 97.5 F | DIASTOLIC BLOOD PRESSURE: 70 MMHG | WEIGHT: 293 LBS | SYSTOLIC BLOOD PRESSURE: 135 MMHG

## 2022-12-27 LAB
ANION GAP SERPL CALCULATED.3IONS-SCNC: 8.9 MMOL/L (ref 5–15)
BUN SERPL-MCNC: 12 MG/DL (ref 8–23)
BUN/CREAT SERPL: 16.2 (ref 7–25)
CALCIUM SPEC-SCNC: 8.9 MG/DL (ref 8.6–10.5)
CHLORIDE SERPL-SCNC: 101 MMOL/L (ref 98–107)
CO2 SERPL-SCNC: 25.1 MMOL/L (ref 22–29)
CREAT SERPL-MCNC: 0.74 MG/DL (ref 0.57–1)
EGFRCR SERPLBLD CKD-EPI 2021: 86.1 ML/MIN/1.73
GLUCOSE SERPL-MCNC: 103 MG/DL (ref 65–99)
POTASSIUM SERPL-SCNC: 4.2 MMOL/L (ref 3.5–5.2)
SODIUM SERPL-SCNC: 135 MMOL/L (ref 136–145)

## 2022-12-27 PROCEDURE — 25010000002 KETOROLAC TROMETHAMINE PER 15 MG: Performed by: INTERNAL MEDICINE

## 2022-12-27 PROCEDURE — 25010000002 ENOXAPARIN PER 10 MG: Performed by: INTERNAL MEDICINE

## 2022-12-27 PROCEDURE — 94799 UNLISTED PULMONARY SVC/PX: CPT

## 2022-12-27 PROCEDURE — 94761 N-INVAS EAR/PLS OXIMETRY MLT: CPT

## 2022-12-27 PROCEDURE — 80048 BASIC METABOLIC PNL TOTAL CA: CPT | Performed by: INTERNAL MEDICINE

## 2022-12-27 PROCEDURE — 25010000002 FUROSEMIDE PER 20 MG: Performed by: INTERNAL MEDICINE

## 2022-12-27 RX ORDER — LEVOTHYROXINE SODIUM 0.07 MG/1
75 TABLET ORAL
Status: DISCONTINUED | OUTPATIENT
Start: 2022-12-27 | End: 2022-12-27 | Stop reason: HOSPADM

## 2022-12-27 RX ADMIN — SODIUM PHOSPHATE 1 ENEMA: 7; 19 ENEMA RECTAL at 09:47

## 2022-12-27 RX ADMIN — POLYETHYLENE GLYCOL 3350 17 G: 17 POWDER, FOR SOLUTION ORAL at 07:40

## 2022-12-27 RX ADMIN — FUROSEMIDE 20 MG: 10 INJECTION, SOLUTION INTRAMUSCULAR; INTRAVENOUS at 07:40

## 2022-12-27 RX ADMIN — MICONAZOLE NITRATE 1 APPLICATION: 20 CREAM TOPICAL at 07:42

## 2022-12-27 RX ADMIN — KETOROLAC TROMETHAMINE 30 MG: 30 INJECTION, SOLUTION INTRAMUSCULAR; INTRAVENOUS at 09:55

## 2022-12-27 RX ADMIN — HYDROCODONE BITARTRATE AND ACETAMINOPHEN 1 TABLET: 10; 325 TABLET ORAL at 05:43

## 2022-12-27 RX ADMIN — IPRATROPIUM BROMIDE AND ALBUTEROL SULFATE 3 ML: .5; 3 SOLUTION RESPIRATORY (INHALATION) at 03:27

## 2022-12-27 RX ADMIN — IPRATROPIUM BROMIDE AND ALBUTEROL SULFATE 3 ML: .5; 3 SOLUTION RESPIRATORY (INHALATION) at 06:55

## 2022-12-27 RX ADMIN — IPRATROPIUM BROMIDE AND ALBUTEROL SULFATE 3 ML: .5; 3 SOLUTION RESPIRATORY (INHALATION) at 00:16

## 2022-12-27 RX ADMIN — LEVOTHYROXINE SODIUM 75 MCG: 75 TABLET ORAL at 09:47

## 2022-12-27 RX ADMIN — ENOXAPARIN SODIUM 60 MG: 100 INJECTION SUBCUTANEOUS at 07:41

## 2022-12-27 NOTE — PLAN OF CARE
Goal Outcome Evaluation:              Outcome Evaluation: VSS. Prn pain meds given for right wrist and chest pain. Pt will be discharged tomorrow to rehab. [facility] was not able to take pt this evening. MD frey.

## 2022-12-27 NOTE — PROGRESS NOTES
[facility]     Progress Note    Patient Name: Lona Rodríguez  : 1950  MRN: [id number]  Primary Care Physician:  Christopher Thao MD  Date of admission: 2022      Subjective   Brief summary.  Patient admitted with wrist fracture and rib fractures  Waiting for nursing home placement      HPI:  Complains of constipation  Taking a lot of pain meds  Also not getting her Synthroid    Review of Systems     Fatigue  Pain in wrist and ribs  Right ribs hurt  No shortness of breath      Objective     Vitals:   Temp:  [97.5 °F (36.4 °C)-98.2 °F (36.8 °C)] 97.5 °F (36.4 °C)  Heart Rate:  [82-91] 86  Resp:  [18-20] 20  BP: (124-145)/(55-79) 135/70  Flow (L/min):  [1] 1    Physical Exam :     Elderly morbidly obese female not in acute distress  Heart regular  Lungs clear  Right chest wall tenderness  Right wrist in cast      Result Review:  I have personally reviewed the results from the time of this admission to 2022 18:12 EST and agree with these findings:  []  Laboratory  []  Microbiology  []  Radiology  []  EKG/Telemetry   []  Cardiology/Vascular   []  Pathology  []  Old records  []  Other:           Assessment / Plan       Active Hospital Problems:  Active Hospital Problems    Diagnosis    • **Intractable pain    • Closed fracture of one rib, unspecified laterality, initial encounter    • Wrist fracture    • Rib fractures    • Lung nodule    • Dyspnea        Plan:   Patient with known history of hypothyroid, restart Synthroid, currently on stool softeners for constipation, fleets/soapsuds enema if no BM, waiting for discharge pending BM.       DVT prophylaxis:  Medical DVT prophylaxis orders are present.    CODE STATUS:   Code Status (Patient has no pulse and is not breathing): CPR (Attempt to Resuscitate)  Medical Interventions (Patient has pulse or is breathing): Full Support  Release to patient: Routine Release            Electronically signed by Luiz Diana MD, 22, 6:12 PM EST.

## 2022-12-27 NOTE — PLAN OF CARE
Goal Outcome Evaluation:              Outcome Evaluation: PRN pain meds given for right sided body pain (see emar).  Patient up to bsc with 2 assist with much anxiety of falling.  Patient with proabable discharge today to [facility].  Continue with plan of care.

## 2022-12-27 NOTE — PROGRESS NOTES
[facility]     Progress Note    Patient Name: Lona Rodríguez  : 1950  MRN: [id number]  Primary Care Physician:  Christopher Thao MD  Date of admission: 2022    Subjective   Subjective     Chief Complaint: Fall with rib and distal right radius fracture    HPI:  Patient Reports she is constipated.Given Miralax with no result Lactulose will be given next.Advised her to take PO meds as she will be discharged to Rehab soon.     Review of Systems   Review of Systems   Constitutional: Activity change: in bed.   HENT: Negative.    Eyes: Negative.    Respiratory: Negative.    Cardiovascular: Chest pain: on coughing.   Gastrointestinal: Positive for constipation.   Endocrine: Negative.    Musculoskeletal: Positive for arthralgias. Myalgias: rib pain.   Skin: Negative.    Allergic/Immunologic: Negative.    Neurological: Negative.    Hematological: Negative.    Psychiatric/Behavioral: Negative.        Objective   Objective     Vitals:   Temp:  [97.6 °F (36.4 °C)-98.4 °F (36.9 °C)] 98.2 °F (36.8 °C)  Heart Rate:  [78-92] 84  Resp:  [16-20] 18  BP: (106-148)/(47-75) 136/55  Flow (L/min):  [1-1.5] 1  Physical Exam    Constitutional: Awake, alert   Eyes: PERRLA, sclerae anicteric, no conjunctival injection   HENT: NCAT, mucous membranes moist   Neck: Supple, no thyromegaly, no lymphadenopathy, trachea midline   Respiratory: Clear to auscultation bilaterally, nonlabored respirations    Cardiovascular: RRR, no murmurs, rubs, or gallops, palpable pedal pulses bilaterally   Gastrointestinal: Positive bowel sounds, soft, nontender, nondistended   Musculoskeletal: No bilateral ankle edema, no clubbing or cyanosis to extremities   Psychiatric: Appropriate affect, cooperative   Neurologic: Oriented x 3, strength symmetric in all extremities, Cranial Nerves grossly intact to confrontation, speech clear   Skin: No rashes     Result Review    Result Review:  I have personally reviewed the results from the time of this  admission to 12/26/2022 20:25 EST and agree with these findings:  []  Laboratory  []  Microbiology  [x]  Radiology  []  EKG/Telemetry   []  Cardiology/Vascular   []  Pathology  []  Old records  []  Other:  Most notable findings include: constipation due to pain medications  Assessment & Plan   Assessment / Plan     Brief Patient Summary:  Lona Rodríguez is a 72 y.o. female who has been on pain medications after rib fractures    Active Hospital Problems:  Active Hospital Problems    Diagnosis    • **Intractable pain    • Closed fracture of one rib, unspecified laterality, initial encounter    • Wrist fracture    • Rib fractures    • Lung nodule    • Dyspnea      Plan:    She was DC to Rehab but nurses in [place] were not able to take her DC postponed for am  Orders written  DC summary written    DVT prophylaxis:  Medical DVT prophylaxis orders are present.    CODE STATUS:   Code Status (Patient has no pulse and is not breathing): CPR (Attempt to Resuscitate)  Medical Interventions (Patient has pulse or is breathing): Full Support  Release to patient: Routine Release      Electronically signed by Jazlyn Luna MD, 12/26/22, 8:25 PM EST.

## 2022-12-27 NOTE — PLAN OF CARE
Goal Outcome Evaluation:              Pt discharged to [facility] rehab, transported by EMS. Report given to Malena

## 2022-12-27 NOTE — PROGRESS NOTES
[facility]   Surgical Progress Note    Patient Name: Lona Rodríguez  : 1950  MRN: [id number]  Date of admission: 2022  Surgical Procedures Since Admission:    Subjective   Subjective     Chief Complaint: Right wrist fracture    History of Present Illness   Status post fall with right wrist fracture, was placed in a splint several days ago.  She states her pain is better, cast is intact.  X-rays show acceptable alignment    Review of Systems    Objective   Objective     Vitals:   Temp:  [97.8 °F (36.6 °C)-98.4 °F (36.9 °C)] 97.8 °F (36.6 °C)  Heart Rate:  [78-92] 89  Resp:  [18-20] 18  BP: (120-148)/(47-71) 133/66  Flow (L/min):  [1] 1    Physical Exam    Cast intact, wiggles fingers, pain control good  Result Review    Result Review:  I have personally reviewed the results from the time of this admission to 2022 07:44 EST and agree with these findings:  []  Laboratory list / accordion  []  Microbiology  [x]  Radiology  []  EKG/Telemetry   []  Cardiology/Vascular   []  Pathology  []  Old records  []  Other:  Most notable findings include: Wrist fracture      Assessment & Plan   Assessment / Plan     Brief Patient Summary:  Lona Rodríguez is a 72 y.o. female who is post casting for wrist fracture    Active Hospital Problems:  Active Hospital Problems    Diagnosis    • **Intractable pain    • Closed fracture of one rib, unspecified laterality, initial encounter    • Wrist fracture    • Rib fractures    • Lung nodule    • Dyspnea      Plan:   Follow-up in my office in 1 week for cast change and x-ray    Precious Kennedy MD

## 2023-01-05 LAB
QT INTERVAL: 345 MS
QT INTERVAL: 365 MS

## 2023-01-19 ENCOUNTER — OFFICE VISIT (OUTPATIENT)
Dept: ORTHOPEDIC SURGERY | Facility: CLINIC | Age: 73
End: 2023-01-19
Payer: MEDICARE

## 2023-01-19 ENCOUNTER — TELEPHONE (OUTPATIENT)
Dept: ORTHOPEDIC SURGERY | Facility: CLINIC | Age: 73
End: 2023-01-19

## 2023-01-19 VITALS — BODY MASS INDEX: 48.82 KG/M2 | HEIGHT: 65 IN | WEIGHT: 293 LBS

## 2023-01-19 DIAGNOSIS — Z47.89 AFTERCARE FOLLOWING SURGERY OF THE MUSCULOSKELETAL SYSTEM: ICD-10-CM

## 2023-01-19 DIAGNOSIS — M25.531 RIGHT WRIST PAIN: Primary | ICD-10-CM

## 2023-01-19 PROCEDURE — 99024 POSTOP FOLLOW-UP VISIT: CPT | Performed by: PHYSICIAN ASSISTANT

## 2023-01-19 NOTE — PROGRESS NOTES
"Chief Complaint  Pain and Follow-up of the Right Wrist    Subjective      Lona Rodríguez presents to [facility] for follow-up of closed, comminuted and mildly displaced distal radius fracture status post closed treatment with short arm cast with molding on 12/23/2022 by Dr. Kennedy. The patient was discharged to have facility for treatment of multiple other injuries, including multiple rib fractures sustained during fall, causing the above-mentioned injury.  She was due for follow-up 1 week after casting for repeat images, however, patient reports she had COVID and was placed in isolation.  She presents today with cast in place.  Does report that she had increased pain in her wrist over the weekend, I felt like I broke it all over again\".  States that she occasionally forgets not to  things with or use her right hand.    Objective   Allergies   Allergen Reactions   • Morphine Anaphylaxis       Vital Signs:   Ht 165.1 cm (65\")   Wt (!) 138 kg (304 lb)   BMI 50.59 kg/m²       Physical Exam    Constitutional: Awake, alert. Well nourished appearance.    Integumentary: Warm, dry, intact. No obvious rashes.    HENT: Atraumatic, normocephalic.   Respiratory: Non labored respirations .   Cardiovascular: Intact peripheral pulses.    Psychiatric: Normal mood and affect. A&O X3    Ortho Exam  Right wrist: Cast removed.  Skin is warm, dry, and intact.  Moderate edema.  No tenderness to palpation of right distal radius.  There is limited ROM to the right wrist with decreased flexion and extension.  Limited supination and pronation.  Patient is able to form a fist.  Good thumb opposition.  Sensation is intact to light touch.  Distal neurovascular intact.    Imaging Results (Most Recent)     Procedure Component Value Units Date/Time    XR Wrist 2 View Right [[id number]] Resulted: 01/19/23 1657     Updated: 01/19/23 1658    Narrative:      X-Ray Report:  Study: X-rays ordered, taken in the " office, and reviewed today.   Site: Right wrist Xray  Indication: Fracture  View: AP/Lateral view(s)  Findings: Good early healing noted to right distal radius fracture,   appropriate anatomic alignment.  Prior studies available for comparison: yes                       Assessment and Plan   Problem List Items Addressed This Visit    None  Visit Diagnoses     Right wrist pain    -  Primary    Relevant Orders    XR Wrist 2 View Right (Completed)    Aftercare following closed reduction of R distal radius fracture              Follow Up   Return in about 3 weeks (around 2/9/2023).  Patient is a non-smoker.  Did not discuss options for smoking cessation    Patient Instructions   X-rays taken and cast removed. Patient placed into R wrist brace. May remove for hygiene and gentle wrist/hand ROM. No lifting, pushing, or pulling.     May ice and elevate as needed for pain and swelling.     Follow up in 3 weeks. Repeat x-rays needed. Call with changes or concerns.     Patient was given instructions and counseling regarding her condition or for health maintenance advice. Please see specific information pulled into the AVS if appropriate.

## 2023-01-19 NOTE — PATIENT INSTRUCTIONS
X-rays taken and cast removed. Patient placed into R wrist brace. May remove for hygiene and gentle wrist/hand ROM. No lifting, pushing, or pulling.     May ice and elevate as needed for pain and swelling.     Follow up in 3 weeks. Repeat x-rays needed. Call with changes or concerns.

## 2023-01-19 NOTE — TELEPHONE ENCOUNTER
SPOKE TO PAT FROM [facility] AND WAS GIVEN PERMISSION TO BILL THEIR FACILITY FOR RT WRIST XRAYS FOR THE PATIENT.

## 2023-02-06 ENCOUNTER — TELEPHONE (OUTPATIENT)
Dept: ORTHOPEDIC SURGERY | Facility: CLINIC | Age: 73
End: 2023-02-06
Payer: MEDICARE

## 2023-02-09 ENCOUNTER — OFFICE VISIT (OUTPATIENT)
Dept: ORTHOPEDIC SURGERY | Facility: CLINIC | Age: 73
End: 2023-02-09
Payer: MEDICARE

## 2023-02-09 VITALS — HEIGHT: 65 IN | WEIGHT: 293 LBS | BODY MASS INDEX: 48.82 KG/M2

## 2023-02-09 DIAGNOSIS — Z47.89 AFTERCARE FOLLOWING SURGERY OF THE MUSCULOSKELETAL SYSTEM: Primary | ICD-10-CM

## 2023-02-09 PROCEDURE — 99024 POSTOP FOLLOW-UP VISIT: CPT | Performed by: ORTHOPAEDIC SURGERY

## 2023-02-10 NOTE — PROGRESS NOTES
"Chief Complaint  Follow-up of the Right Wrist     Subjective      Lona Rodríguez presents to [facility] for follow-up of closed, comminuted and mildly displaced distal radius fracture status post closed treatment with short arm cast with molding on 12/23/2022.  She is wearing a brace and notes some pain still.  She is working on increasing ROM for increase ability with ADL's and fine motor coordination with tasks.     Allergies   Allergen Reactions   • Morphine Anaphylaxis        Social History     Socioeconomic History   • Marital status: Single   Tobacco Use   • Smoking status: Never   • Smokeless tobacco: Never   Vaping Use   • Vaping Use: Never used   Substance and Sexual Activity   • Alcohol use: Never   • Drug use: Never        Review of Systems     Objective   Vital Signs:   Ht 165.1 cm (65\")   Wt (!) 138 kg (304 lb)   BMI 50.59 kg/m²       Physical Exam  Constitutional:       Appearance: Normal appearance. Patient is well-developed and normal weight.   HENT:      Head: Normocephalic.      Right Ear: Hearing and external ear normal.      Left Ear: Hearing and external ear normal.      Nose: Nose normal.   Eyes:      Conjunctiva/sclera: Conjunctivae normal.   Cardiovascular:      Rate and Rhythm: Normal rate.   Pulmonary:      Effort: Pulmonary effort is normal.      Breath sounds: No wheezing or rales.   Abdominal:      Palpations: Abdomen is soft.      Tenderness: There is no abdominal tenderness.   Musculoskeletal:      Cervical back: Normal range of motion.   Skin:     Findings: No rash.   Neurological:      Mental Status: Patient is alert and oriented to person, place, and time.   Psychiatric:         Mood and Affect: Mood and affect normal.         Judgment: Judgment normal.       Ortho Exam      RIGHT WRIST Sensation grossly intact to light touch, median, radial and ulnar nerve. Positive AIN, PIN and ulnar nerve motor function intact. Axillary nerve intact. Positive " pulses. Full , thumb opposition, MCP flexors, DIP flexors and PIP flexors. Continued increase ROM for wrist flexion/extension, pronation and supination.         Procedures      Imaging Results (Most Recent)     None           Result Review :         XR Wrist 2 View Right    Result Date: 1/23/2023  Narrative: X-Ray Report: Study: X-rays ordered, taken in the office, and reviewed today. Site: Right wrist Xray Indication: Fracture View: AP/Lateral view(s) Findings: Good early healing noted to right distal radius fracture, appropriate anatomic alignment. Prior studies available for comparison: yes              Assessment and Plan     Diagnoses and all orders for this visit:    1. Aftercare following closed reduction of R distal radius fracture (Primary)        Discussed the treatment plan with the patient. Continue with the brace and work on ROM and strength. X rays brought from facility showing moderate interval healing.     Call or return if worsening symptoms.    Follow Up     4 weeks X ray      Patient was given instructions and counseling regarding her condition or for health maintenance advice. Please see specific information pulled into the AVS if appropriate.     Scribed for Precious Kennedy MD by Elisa Pink MA.  02/10/23   07:23 EST    I have personally performed the services described in this document as scribed by the above individual and it is both accurate and complete. Precious Kennedy MD 02/10/23

## 2023-03-09 ENCOUNTER — OFFICE VISIT (OUTPATIENT)
Dept: ORTHOPEDIC SURGERY | Facility: CLINIC | Age: 73
End: 2023-03-09
Payer: MEDICARE

## 2023-03-09 VITALS — BODY MASS INDEX: 48.82 KG/M2 | WEIGHT: 293 LBS | HEIGHT: 65 IN

## 2023-03-09 DIAGNOSIS — Z47.89 AFTERCARE FOLLOWING SURGERY OF THE MUSCULOSKELETAL SYSTEM: Primary | ICD-10-CM

## 2023-03-09 PROCEDURE — 1160F RVW MEDS BY RX/DR IN RCRD: CPT | Performed by: PHYSICIAN ASSISTANT

## 2023-03-09 PROCEDURE — 99024 POSTOP FOLLOW-UP VISIT: CPT | Performed by: PHYSICIAN ASSISTANT

## 2023-03-09 PROCEDURE — 1159F MED LIST DOCD IN RCRD: CPT | Performed by: PHYSICIAN ASSISTANT

## 2023-03-09 NOTE — PROGRESS NOTES
"Chief Complaint  Pain and Follow-up of the Right Wrist    Subjective      Lona Rodríguez presents to [facility] for follow-up of right wrist.  Patient sustained comminuted and mildly displaced distal radius fracture and underwent closed treatment with short arm casting with molding on 12/23/2022.  Patient was last seen in office on 2/9/2023, recommended to continue bracing to the right wrist.  She presents today with brace in place, reporting that she is \"doing fair\".  States she has had continued swelling, although pain has continued to improve for her.  She is participating in home health therapy, although they are requesting information regarding weight restrictions.    Objective   Allergies   Allergen Reactions   • Morphine Anaphylaxis       Vital Signs:   Ht 165.1 cm (65\")   Wt (!) 138 kg (305 lb)   BMI 50.75 kg/m²       Physical Exam    Constitutional: Awake, alert. Well nourished appearance.    Integumentary: Warm, dry, intact. No obvious rashes.    HENT: Atraumatic, normocephalic.   Respiratory: Non labored respirations .   Cardiovascular: Intact peripheral pulses.    Psychiatric: Normal mood and affect. A&O X3    Ortho Exam  Right wrist: Brace removed for this exam.  Skin is warm, dry, and intact.  Moderate edema.  No tenderness to palpation of distal radius.  Limited flexion and extension of the wrist.  Limited pronation and supination.  Patient is able to form a full fist.  Good thumb opposition.  Sensation intact to light touch.  Distal neurovascular intact.    Imaging Results (Most Recent)     Procedure Component Value Units Date/Time    XR Wrist 2 View Right [[id number]] Resulted: 03/10/23 1259     Updated: 03/10/23 1300    Narrative:      X-Ray Report:  Study: X-rays ordered, taken in the office, and reviewed today.   Site: Right wrist Xray  Indication: Fracture  View: AP/Lateral view(s)  Findings: Well-healing distal radius fracture, appropriate anatomic "   alignment.  Prior studies available for comparison: yes                       Assessment and Plan   Problem List Items Addressed This Visit    None  Visit Diagnoses     Aftercare following surgery of the musculoskeletal system    -  Primary    Relevant Orders    XR Wrist 2 View Right (Completed)    Ambulatory Referral to Home Health (Outpatient) (Completed)        Follow Up   Return in about 6 weeks (around 4/20/2023).  Patient is a non-smoker.  Did not discuss options for smoking cessation.    Patient Instructions   X-rays taken and reviewed. Patient can discontinue brace. Advised to continue home exercises and participation in home health PT. Patient may progress to strengthening efforts as tolerated, up to 5 lbs.     Follow up in 6 weeks. Repeat x-rays. Call with changes or concerns.     Patient was given instructions and counseling regarding her condition or for health maintenance advice. Please see specific information pulled into the AVS if appropriate.

## 2023-03-09 NOTE — PATIENT INSTRUCTIONS
X-rays taken and reviewed. Patient can discontinue brace. Advised to continue home exercises and participation in home health PT. Patient may progress to strengthening efforts as tolerated, up to 5 lbs.     Follow up in 6 weeks. Repeat x-rays. Call with changes or concerns.

## 2023-05-02 ENCOUNTER — OFFICE VISIT (OUTPATIENT)
Dept: ORTHOPEDIC SURGERY | Facility: CLINIC | Age: 73
End: 2023-05-02
Payer: MEDICARE

## 2023-05-02 VITALS — WEIGHT: 293 LBS | HEIGHT: 65 IN | BODY MASS INDEX: 48.82 KG/M2

## 2023-05-02 DIAGNOSIS — M25.531 RIGHT WRIST PAIN: Primary | ICD-10-CM

## 2023-05-02 DIAGNOSIS — S52.501D CLOSED FRACTURE OF DISTAL END OF RIGHT RADIUS WITH ROUTINE HEALING, UNSPECIFIED FRACTURE MORPHOLOGY, SUBSEQUENT ENCOUNTER: ICD-10-CM

## 2023-05-02 RX ORDER — FUROSEMIDE 40 MG/1
1 TABLET ORAL EVERY 12 HOURS SCHEDULED
COMMUNITY
Start: 2023-04-20

## 2023-05-02 RX ORDER — HYDROCODONE BITARTRATE AND ACETAMINOPHEN 7.5; 325 MG/1; MG/1
1 TABLET ORAL EVERY 6 HOURS
COMMUNITY
Start: 2023-04-20

## 2023-05-02 RX ORDER — POTASSIUM CHLORIDE 750 MG/1
1 TABLET, FILM COATED, EXTENDED RELEASE ORAL EVERY 12 HOURS SCHEDULED
COMMUNITY
Start: 2023-04-20

## 2023-05-02 RX ORDER — METHYLPREDNISOLONE 4 MG/1
TABLET ORAL
Qty: 21 TABLET | Refills: 0 | Status: SHIPPED | OUTPATIENT
Start: 2023-05-02

## 2023-05-02 RX ORDER — METHOCARBAMOL 500 MG/1
1 TABLET, FILM COATED ORAL EVERY 12 HOURS SCHEDULED
COMMUNITY
Start: 2023-04-20

## 2023-05-02 NOTE — PATIENT INSTRUCTIONS
X-rays taken and reviewed. Patient is progressing slowly. Advised to continue PT to completion to progress strength and ROM. Continue home exercises.     Continue icing as needed up to 4 times daily for no longer than 15-20 mins at a time.     Rx for medrol dosepak sent to pharmacy.     Follow-up in 6 weeks. Repeat x-rays not needed. Call with changes or concerns.

## 2023-05-02 NOTE — PROGRESS NOTES
"Chief Complaint  Follow-up of the Right Wrist    Subjective      Lona Rodríguez presents to [facility] for follow-up of distal radius fracture status post closed treatment with short arm casting with molding performed on 12/23/2022 by Dr. Kennedy.  She was last seen in office on 3/9/2023, recommended to continue participation in in home health PT.  The patient presents today for follow-up stating \"I am not doing very well at all\".  She reports that her right wrist remains swollen, painful with ROM, however, that she does feel she has regained near full ROM.  Does state that she is \"better than when I started PT\".  She reports dissatisfaction because \"my wrist looks crooked\".  She has been participating in home health PT once weekly with approximately 4 sessions remaining.    Objective   Allergies   Allergen Reactions   • Morphine Anaphylaxis       Vital Signs:   Ht 165.1 cm (65\")   Wt (!) 138 kg (305 lb)   BMI 50.75 kg/m²       Physical Exam    Constitutional: Awake, alert. Well nourished appearance.    Integumentary: Warm, dry, intact. No obvious rashes.    HENT: Atraumatic, normocephalic.   Respiratory: Non labored respirations .   Cardiovascular: Intact peripheral pulses.    Psychiatric: Normal mood and affect. A&O X3    Ortho Exam  Right wrist: Skin is warm, dry, and intact.  Mild deformity appreciated.  There is no tenderness to palpation of distal radius.  Near full flexion and extension of the wrist.  Full pronation and supination.  Patient is able to form a full fist.  Good thumb opposition.  Sensation intact light touch.  Distal neurovascular intact.    Imaging Results (Most Recent)     Procedure Component Value Units Date/Time    XR Wrist 2 View Right [[id number]] Resulted: 05/04/23 0808     Updated: 05/04/23 0809    Narrative:      X-Ray Report:  Study: X-rays ordered, taken in the office, and reviewed today.   Site: Right wrist xray  Indication: Fracture  View: AP/Lateral " view(s)  Findings: Continued healing noted to impacted fracture of the right distal   radius.  Prior studies available for comparison: yes                       Assessment and Plan   Problem List Items Addressed This Visit    None  Visit Diagnoses     Right wrist pain    -  Primary    Relevant Orders    XR Wrist 2 View Right (Completed)    Closed fracture of distal end of right radius with routine healing, unspecified fracture morphology, subsequent encounter              Follow Up   Return in about 6 weeks (around 6/13/2023).  Patient is a non-smoker.  Did not discuss tobacco cessation options.    Patient Instructions   X-rays taken and reviewed. Patient is progressing slowly. Advised to continue PT to completion to progress strength and ROM. Continue home exercises.     Continue icing as needed up to 4 times daily for no longer than 15-20 mins at a time.     Rx for medrol dosepak sent to pharmacy.     Follow-up in 6 weeks. Repeat x-rays not needed. Call with changes or concerns.       Patient was given instructions and counseling regarding her condition or for health maintenance advice. Please see specific information pulled into the AVS if appropriate.

## 2023-05-23 ENCOUNTER — OFFICE VISIT (OUTPATIENT)
Dept: ORTHOPEDIC SURGERY | Facility: CLINIC | Age: 73
End: 2023-05-23
Payer: MEDICARE

## 2023-05-23 VITALS — HEIGHT: 65 IN | WEIGHT: 293 LBS | BODY MASS INDEX: 48.82 KG/M2

## 2023-05-23 DIAGNOSIS — M17.11 OSTEOARTHRITIS OF RIGHT KNEE, UNSPECIFIED OSTEOARTHRITIS TYPE: Primary | ICD-10-CM

## 2023-05-23 NOTE — PROGRESS NOTES
"Chief Complaint  Pain and Follow-up of the Right Knee     Subjective      Lona Rodríguez presents to [facility] for follow up of the right knee.  She has had pain for years she has treated conservatively.  He has had injections in the past.  She had a gel injection in the past.  She is here today for a right knee Monovisc injection.  She has pain around the inside circling the patella. She ambulates with a rollator and has had no recent injury.     Allergies   Allergen Reactions   • Morphine Anaphylaxis        Social History     Socioeconomic History   • Marital status: Single   Tobacco Use   • Smoking status: Never   • Smokeless tobacco: Never   Vaping Use   • Vaping Use: Never used   Substance and Sexual Activity   • Alcohol use: Never   • Drug use: Never        Review of Systems     Objective   Vital Signs:   Ht 165.1 cm (65\")   Wt (!) 138 kg (305 lb)   BMI 50.75 kg/m²       Physical Exam  Constitutional:       Appearance: Normal appearance. Patient is well-developed and normal weight.   HENT:      Head: Normocephalic.      Right Ear: Hearing and external ear normal.      Left Ear: Hearing and external ear normal.      Nose: Nose normal.   Eyes:      Conjunctiva/sclera: Conjunctivae normal.   Cardiovascular:      Rate and Rhythm: Normal rate.   Pulmonary:      Effort: Pulmonary effort is normal.      Breath sounds: No wheezing or rales.   Abdominal:      Palpations: Abdomen is soft.      Tenderness: There is no abdominal tenderness.   Musculoskeletal:      Cervical back: Normal range of motion.   Skin:     Findings: No rash.   Neurological:      Mental Status: Patient is alert and oriented to person, place, and time.   Psychiatric:         Mood and Affect: Mood and affect normal.         Judgment: Judgment normal.       Ortho Exam      RIGHT KNEE Flexion 110 Extension -3. Stable to varus/valgus stress. Stable to anterior/posterior drawer. Neurovascularly intact. Negative " Prema. Negative Lachman. Positive EHL, FHL, HS and TA. Sensation intact to light touch all 5 nerves of the foot. Ambulates with Antalgic gait. Patella is well tracking. Calf supple, non-tender. Positive tenderness to the medial joint line. Positive tenderness to the lateral joint line. Positive Crepitus. Good strength to hamstrings, quadriceps, dorsiflexors, and plantar flexors.  Knee Extensor Mechanism intact      Large Joint Arthrocentesis: R knee  Date/Time: 5/23/2023 2:28 PM  Consent given by: patient  Site marked: site marked  Timeout: Immediately prior to procedure a time out was called to verify the correct patient, procedure, equipment, support staff and site/side marked as required   Supporting Documentation  Indications: pain   Procedure Details  Location: knee - R knee  Needle gauge: 21g.  Medications administered: 88 mg Hyaluronan 88 MG/4ML  Patient tolerance: patient tolerated the procedure well with no immediate complications            Imaging Results (Most Recent)     None           Result Review :            Assessment and Plan     Diagnoses and all orders for this visit:    1. Osteoarthritis of right knee, unspecified osteoarthritis type (Primary)        Discussed the treatment plan with the patient. Discussed the risks and benefits of conservative measures.     The patient expressed understanding and wished to proceed with a right knee Monovisc injection.  She tolerated the injection well.       Call or return if worsening symptoms.    Follow Up     PRN    Patient was given instructions and counseling regarding her condition or for health maintenance advice. Please see specific information pulled into the AVS if appropriate.     Scribed for Precious Kennedy MD by Elisa Pink MA.  05/23/23   14:22 EDT    I have personally performed the services described in this document as scribed by the above individual and it is both accurate and complete. Precious Kennedy MD 05/24/23

## 2023-08-10 ENCOUNTER — TELEPHONE (OUTPATIENT)
Dept: SURGERY | Facility: CLINIC | Age: 73
End: 2023-08-10
Payer: MEDICARE

## 2023-08-10 ENCOUNTER — TRANSCRIBE ORDERS (OUTPATIENT)
Dept: ADMINISTRATIVE | Facility: HOSPITAL | Age: 73
End: 2023-08-10
Payer: MEDICARE

## 2023-08-10 DIAGNOSIS — C50.912 MALIGNANT NEOPLASM OF LEFT FEMALE BREAST, UNSPECIFIED ESTROGEN RECEPTOR STATUS, UNSPECIFIED SITE OF BREAST: Primary | ICD-10-CM

## 2023-08-10 NOTE — TELEPHONE ENCOUNTER
CALLED PT TO SCHEDULE AN APPT, NO ANSWER, NO VOICEMAIL, CALLED DR LIU'S OFFICE AND SPOKE TO DANE, PT IS BEING SEEN THERE AND SHE IS GOING TO CALL BACK TO SCHEDULE PT APPT WITH DR NO THURSDAY 8/17/23 PER ARMANI. PLEASE  SEND HER TO THE APPT DESK.

## 2023-08-16 RX ORDER — POTASSIUM CHLORIDE 750 MG/1
TABLET, EXTENDED RELEASE ORAL EVERY 12 HOURS SCHEDULED
COMMUNITY
Start: 2023-04-20

## 2023-08-16 RX ORDER — ONDANSETRON 4 MG/1
TABLET, FILM COATED ORAL EVERY 8 HOURS SCHEDULED
COMMUNITY
Start: 2023-03-08

## 2023-08-16 RX ORDER — PREDNISONE 20 MG/1
TABLET ORAL
COMMUNITY
Start: 2023-08-09

## 2023-08-17 ENCOUNTER — OFFICE VISIT (OUTPATIENT)
Dept: SURGERY | Facility: CLINIC | Age: 73
End: 2023-08-17
Payer: MEDICARE

## 2023-08-17 VITALS — WEIGHT: 256 LBS | RESPIRATION RATE: 15 BRPM | HEIGHT: 65 IN | BODY MASS INDEX: 42.65 KG/M2

## 2023-08-17 DIAGNOSIS — N64.89 BREAST ASYMMETRY IN FEMALE: Primary | ICD-10-CM

## 2023-08-17 DIAGNOSIS — R23.4 BREAST SKIN CHANGES: ICD-10-CM

## 2023-08-17 PROCEDURE — 99204 OFFICE O/P NEW MOD 45 MIN: CPT | Performed by: SURGERY

## 2023-08-17 PROCEDURE — 1160F RVW MEDS BY RX/DR IN RCRD: CPT | Performed by: SURGERY

## 2023-08-17 PROCEDURE — 1159F MED LIST DOCD IN RCRD: CPT | Performed by: SURGERY

## 2023-08-17 NOTE — PROGRESS NOTES
Chief Complaint: Abnormal Breast Imaging    Subjective         History of Present Illness  Lona Rodríguez is a 73 y.o. female presents to [facility] to be seen for breast skin changes and enlargement of left breast.  She has a hx of breast cancer in this breast in 1996.  Her imaging is shown below:    Narrative & Impression   PROCEDURE:  MAMMO DIAGNOSTIC DIGITAL TOMOSYNTHESIS BILATERAL W CAD, 7/12/2023, 13:52  US BREAST LEFT LIMITED, 7/12/2023, 14:12     COMPARISON:  [facility], , DIG DIAG LEFT JASVIR W 3D JM, 1/25/2021, 15:05.     VIEWS:  DIAGNOSTIC VIEWS WERE OBTAINED UTILIZING 3D TOMOSYNTHESIS AND R2 CAD SOFTWARE     INDICATIONS:  73-year-old female with remote history of left breast cancer/lumpectomy 1996/1997.    She presents for diagnostic evaluation of intermittent left breast swelling.     FINDINGS:          Technologist note:  Patient with limited range of motion.  Images obtained best possible.  Portions   of the breast nonvisualized cannot be evaluated.     Right breast:  Partially visualized right port catheter.  There are benign-type calcifications   within the right breast.  There are no dominant masses, suspicious microcalcifications or areas of   architectural distortion in the right breast.     Left breast:  Visualized left breast demonstrates progressive coarse calcifications within the   lumpectomy bed compatible with evolving fat necrosis.  There are numerous secretory type   calcifications within the left breast.  There is diffuse trabecular thickening and increased global   asymmetry of the visualized left breast some of which is likely from chronic post treatment   changes.  Superimposed edema or diffuse infiltrative neoplastic disease can have a similar   appearance.  There is chronic diffuse skin thickening.     High-resolution focused left breast ultrasound at the 12 o'clock position 8 cm from the nipple,   0300 hours 5-8 cm from the  nipple, 0800 hours 8 cm from the nipple, 0600 hours  5 cm from the   nipple and 0900 hours 8 cm from the nipple, reported areas of breast swelling, demonstrate diffuse   breast edema.  No discrete mass or areas of abnormal shadowing.  There is moderate in skin   thickening at the 1200 hours and 0300 hours positions.     IMPRESSION:  Right breast:  Benign mammogram.  Recommend routine right breast screening.     Left breast:  Trabecular thickening and diffuse edema of the left breast with localized skin   thickening pronounced at the  hours positions.  Etiologies for breast edema and trabecular   thickening include congestive heart failure/problems with cardiac pump function.  The patient   reports six-month history of gradual increasing Lasix dosage to treat peripheral edema which she   reports has improved.  She also reports a history of shortness of breath on exertion which has also   improved with Lasix administration.  She has been scheduled for cardiac consultation 08/2023.  Other etiologies for trabecular thickening included diffuse neoplastic disease.  At this time   suggest short interval four-month mammographic and sonographic follow-up.  Pending cardiac   consultation results, the more immediate skin punch biopsy in the area of edema and or breast MRI   can be performed given high risk.  All findings and recommendations discussed directly with the   patient at the time of exam.     RECOMMENDATION(S):               CLINICAL EVALUATION.       SHORT TERM FOLLOW-UP DIAGNOSTIC MAMMOGRAM LEFT BREAST IN 4 MONTHS.       SHORT TERM FOLLOW-UP ULTRASOUND LEFT BREAST IN 4 MONTHS.       BIRADS:               DIAGNOSTIC CATEGORY 3--PROBABLY BENIGN FINDING.       BREAST COMPOSITION:          Scattered areas fibroglandular density.          Objective     Past Medical History:   Diagnosis Date    AMD (age related macular degeneration)     Anemia Following surgery    Arthritis     Asthma     Breast cancer Left  breast.    Breast mass 2 lumpectomies 1 cancerous 1 benign    CHF (congestive heart failure) Just getting evaluation    Colon polyp Removed during colonos    COPD (chronic obstructive pulmonary disease)     Coronary artery disease Chf    Deep vein thrombosis 1979    Fibrocystic breast Left breast    Fibromyalgia     History of transfusion Aug 2019 2 units    Hyperlipidemia     Hypertension     Obesity     Polymyalgia arteritica     Postoperative wound infection Lymph node resection site,5th toe left foot infection after neuroma surgery. 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    Sciatica        Past Surgical History:   Procedure Laterality Date    BREAST BIOPSY  Left    BREAST SURGERY  Lumpectomies left breast    EYE SURGERY  Lens implants both eyes    REPLACEMENT TOTAL KNEE      TONSILLECTOMY           Current Outpatient Medications:     amitriptyline (ELAVIL) 50 MG tablet, Take 1 tablet by mouth Every Night., Disp: , Rfl:     atorvastatin (LIPITOR) 20 MG tablet, Take 1 tablet by mouth Daily., Disp: , Rfl:     Budeson-Glycopyrrol-Formoterol (Breztri Aerosphere) 160-9-4.8 MCG/ACT aerosol inhaler, Inhale 2 puffs 2 (Two) Times a Day., Disp: , Rfl:     furosemide (LASIX) 40 MG tablet, Take 1 tablet by mouth Every 12 (Twelve) Hours., Disp: , Rfl:     HYDROcodone-acetaminophen (NORCO) 7.5-325 MG per tablet, Take 1 tablet by mouth Every 6 (Six) Hours., Disp: , Rfl:     irbesartan (AVAPRO) 300 MG tablet, Take 1 tablet by mouth Every Night., Disp: , Rfl:     KLOR-CON 10 MEQ CR tablet, Every 12 (Twelve) Hours., Disp: , Rfl:     levothyroxine (SYNTHROID, LEVOTHROID) 75 MCG tablet, Take 1 tablet by mouth Daily., Disp: , Rfl:     methocarbamol (ROBAXIN) 500 MG tablet, Take 1 tablet by mouth Every 12 (Twelve) Hours., Disp: , Rfl:     montelukast (SINGULAIR) 10 MG tablet, Take 1 tablet by mouth Every Night., Disp: , Rfl:     omeprazole (priLOSEC) 40 MG capsule, Take 1 capsule by mouth Daily., Disp: , Rfl:     ondansetron (ZOFRAN) 4 MG tablet,  "Every 8 (Eight) Hours., Disp: , Rfl:     potassium chloride 10 MEQ CR tablet, Take 1 tablet by mouth Every 12 (Twelve) Hours., Disp: , Rfl:     predniSONE (DELTASONE) 20 MG tablet, TAKE ONE TABLET BY MOUTH EVERY DAY for 7 days, Disp: , Rfl:     triamcinolone (KENALOG) 0.1 % ointment, applied topically 3 times a day for 7 day(s)], Disp: , Rfl:     vitamin D (ERGOCALCIFEROL) 1.25 MG (64267 UT) capsule capsule, Take 1 capsule by mouth 2 (Two) Times a Week., Disp: , Rfl:     gabapentin (NEURONTIN) 300 MG capsule, Take 1 capsule by mouth 3 (Three) Times a Day for 30 days., Disp: 90 capsule, Rfl: 0    ipratropium-albuterol (DUO-NEB) 0.5-2.5 mg/3 ml nebulizer, Take 3 mL by nebulization Every 4 (Four) Hours for 30 days., Disp: 540 mL, Rfl: 0    Allergies   Allergen Reactions    Morphine Anaphylaxis        Family History   Problem Relation Age of Onset    Arthritis Mother     Cancer Mother     Heart disease Mother     Arthritis Father     COPD Father     Arthritis Brother     Diabetes Brother     Learning disabilities Brother         Dyslexic    Mental illness Maternal Aunt         Schizophrenic       Social History     Socioeconomic History    Marital status: Single   Tobacco Use    Smoking status: Never    Smokeless tobacco: Never    Tobacco comments:     Grew up with [id number]   Vaping Use    Vaping Use: Never used   Substance and Sexual Activity    Alcohol use: Never    Drug use: Never    Sexual activity: Not Currently     Partners: Male     Birth control/protection: Diaphragm, Abstinence       Vital Signs:   Resp 15   Ht 165.1 cm (65\")   Wt 116 kg (256 lb)   BMI 42.60 kg/mý    Review of Systems    Physical Exam  Vitals and nursing note reviewed.   Constitutional:       Appearance: Normal appearance.   HENT:      Head: Normocephalic and atraumatic.   Eyes:      Extraocular Movements: Extraocular movements intact.      Pupils: Pupils are equal, round, and reactive to light.   Cardiovascular:      Pulses: Normal pulses. "   Pulmonary:      Effort: Pulmonary effort is normal. No accessory muscle usage or respiratory distress.   Chest:   Breasts:     Right: Normal. No inverted nipple, nipple discharge or skin change.      Left: Normal. Swelling and skin change present. No inverted nipple, mass or nipple discharge.      Comments: The left breast is tense and swollen and grossly abnormal appearing when compared to the right.    Abdominal:      General: Abdomen is flat.      Palpations: Abdomen is soft.      Tenderness: There is no abdominal tenderness. There is no guarding.   Musculoskeletal:         General: No swelling, tenderness or deformity.      Cervical back: Neck supple.   Lymphadenopathy:      Upper Body:      Right upper body: No supraclavicular or axillary adenopathy.      Left upper body: No supraclavicular or axillary adenopathy.   Skin:     General: Skin is warm and dry.   Neurological:      General: No focal deficit present.      Mental Status: She is alert and oriented to person, place, and time.   Psychiatric:         Mood and Affect: Mood normal.         Thought Content: Thought content normal.        Result Review :               Assessment and Plan    Diagnoses and all orders for this visit:    1. Breast asymmetry in female (Primary)  -     MRI Breast Bilateral Screening With & Without Contrast; Future    2. Breast skin changes  -     MRI Breast Bilateral Screening With & Without Contrast; Future    She may need a skin punch biopsy but will plan for that after her MRI is completed so that it does not skew that imaging or read as false positive skin inflammation  There was not one specific area of skin on exam that was more affected than the rest of breast so also hoping MRI will help guide skin biopsy if it is necessary  Follow up after MRI  Agree with PET     Follow Up   Return for Followup after imaging study complete.  Patient was given instructions and counseling regarding her condition or for health maintenance  advice. Please see specific information pulled into the AVS if appropriate.         This document has been electronically signed by Lara Terry MD  August 17, 2023 12:17 EDT

## 2023-08-24 ENCOUNTER — TELEPHONE (OUTPATIENT)
Dept: SURGERY | Facility: CLINIC | Age: 73
End: 2023-08-24
Payer: MEDICARE

## 2023-08-24 NOTE — TELEPHONE ENCOUNTER
Patient is scheduled for MRI and PET CT scan for tomorrow. She is wanting to know what her prep is for these scans. Please call patient and speak with her.

## 2023-08-25 ENCOUNTER — TELEPHONE (OUTPATIENT)
Dept: SURGERY | Facility: CLINIC | Age: 73
End: 2023-08-25
Payer: MEDICARE

## 2023-08-25 ENCOUNTER — HOSPITAL ENCOUNTER (OUTPATIENT)
Dept: MRI IMAGING | Facility: HOSPITAL | Age: 73
Discharge: HOME | End: 2023-08-25
Admitting: SURGERY
Payer: MEDICARE

## 2023-08-25 DIAGNOSIS — N64.89 BREAST ASYMMETRY IN FEMALE: ICD-10-CM

## 2023-08-25 DIAGNOSIS — R23.4 BREAST SKIN CHANGES: ICD-10-CM

## 2023-08-25 LAB
CREAT BLDA-MCNC: 0.6 MG/DL
EGFRCR SERPLBLD CKD-EPI 2021: 94.9 ML/MIN/1.73

## 2023-08-25 PROCEDURE — 82565 ASSAY OF CREATININE: CPT

## 2023-08-25 PROCEDURE — 77047 MRI BREAST C- BILATERAL: CPT

## 2023-08-25 NOTE — TELEPHONE ENCOUNTER
PATIENT CALLED.  SHE WAS SUPPOSED TO HAVE AN MRI TODAY, AND SHE FORGOT SHE WAS SUPPOSED TO LAY ON HER STOMACH.  SHE HAD A BROKEN RIB DURING THE WINTER.  HER MUSCLE STARTED HAVING SPASM AND SHE COULDN'T LAY THERE.  SHE SAID SHE FLUNKED.    THE  FACILITY IS GOING TO SUBMIT THE FIRST PICTURE, BUT IT IS NON-DIAGNOSTIC.    SHE ASKED FOR KIESHA TO CALL HER BACK.

## 2023-08-25 NOTE — TELEPHONE ENCOUNTER
Call placed to patient's spouse Joseph. He confirms that he gave Rosaura 2mg of Warfarin last evening. He states that Rosaura does have her home monitor and supplies and he will try to obtain her INRs on her home monitor. He is advised to give her 2mg of Warfarin this evening and obtain INR tomorrow morning on home monitor. Asked that he call if unable to obtain and she will then need to be scheduled to come to the clinic. No further questions at this time.     Timed staff message created.   Pt called back and I answered her concerns.

## 2023-08-25 NOTE — TELEPHONE ENCOUNTER
Jaclyn with orders team needs an updated order on MRI breat bilateral. Dr Terry puts order # [id number].  Jaclyn said she needs the order # [id number] put in. Pt is scheduled for MRI today at 12:45pm.   Jaclyn number [phone]

## 2023-09-01 ENCOUNTER — HOSPITAL ENCOUNTER (OUTPATIENT)
Dept: PET IMAGING | Facility: HOSPITAL | Age: 73
Discharge: HOME | End: 2023-09-01
Payer: MEDICARE

## 2023-09-01 DIAGNOSIS — C50.912 MALIGNANT NEOPLASM OF LEFT FEMALE BREAST, UNSPECIFIED ESTROGEN RECEPTOR STATUS, UNSPECIFIED SITE OF BREAST: ICD-10-CM

## 2023-09-01 PROCEDURE — A9552 F18 FDG: HCPCS | Performed by: INTERNAL MEDICINE

## 2023-09-01 PROCEDURE — 0 FLUDEOXYGLUCOSE F18 SOLUTION: Performed by: INTERNAL MEDICINE

## 2023-09-01 PROCEDURE — 78815 PET IMAGE W/CT SKULL-THIGH: CPT

## 2023-09-01 RX ADMIN — FLUDEOXYGLUCOSE F18 1 DOSE: 300 INJECTION INTRAVENOUS at 11:33

## 2023-09-05 ENCOUNTER — TRANSCRIBE ORDERS (OUTPATIENT)
Dept: ADMINISTRATIVE | Facility: HOSPITAL | Age: 73
End: 2023-09-05
Payer: MEDICARE

## 2023-09-05 ENCOUNTER — TELEPHONE (OUTPATIENT)
Dept: SURGERY | Facility: CLINIC | Age: 73
End: 2023-09-05
Payer: MEDICARE

## 2023-09-05 DIAGNOSIS — C50.912 MALIGNANT NEOPLASM OF LEFT FEMALE BREAST, UNSPECIFIED ESTROGEN RECEPTOR STATUS, UNSPECIFIED SITE OF BREAST: Primary | ICD-10-CM

## 2023-09-05 NOTE — TELEPHONE ENCOUNTER
"PLEASE SEE PHONE ENCOUNTER FROM 08/25/23.    PATIENT CALLED AND SAID THAT DR. LIU ASKED FOR HER TO CALL TO FIND OUT IF AND/OR WHEN PATIENT IS TO SEE DR. NO AGAIN.  IS SHE STILL TO HAVE A PUNCH BIOPSY WITHOUT THE MRI?  PATIENT SAID SHE \"FLUNKED\" THE MRI.  "

## 2023-09-06 NOTE — TELEPHONE ENCOUNTER
PATIENT WANTS TO KNOW IF DR. NO RECEIVED THE NON-DIAGNOSTIC IMAGE FROM WHERE SHE WAS SUPPOSED TO HAVE HAD THE MRI.  SHE WANTS TO KNOW IF IT HELPED ANY OR IF SHE WAS ABLE TO SEE ANYTHING.

## 2023-09-06 NOTE — TELEPHONE ENCOUNTER
I CALLED THE PATIENT AND TOLD HER THAT DR. NO WANTS HER TO BE SEEN 06/13/23 AT END OF HER CLINIC.  PATIENT HAS 2 PRIOR APPOINTMENTS THAT AFTERNOON.    PER ARMANI, OKAY TO SCHEDULE 09/14/23.  I SCHEDULED THE APPOINTMENT WITH THE PATIENT FOR 09/14/23 AT 11:30.

## 2023-09-07 NOTE — TELEPHONE ENCOUNTER
I TRIED CALLING PATIENT, BUT THERE WAS NO VOICE MAIL SET UP AND NO ANSWER.    DR. NO SAID THAT SHE CAN SEE IT, IT JUST SAYS COULD NOT BE DONE, SO REALLY NO HELP AT ALL.   daughter and son in law.

## 2023-09-08 NOTE — TELEPHONE ENCOUNTER
I CALLED THE PATIENT AND RELAYED MESSAGE, PER DR. NO:  DR. NO SAID THAT SHE CAN SEE IT, IT JUST SAYS COULD NOT BE DONE, SO REALLY NO HELP AT ALL.     DR. NO, THE PATIENT SAID SHE DOESN'T KNOW IF IT WILL HELP OR NOT, BUT SHE HAD A CT OF HER BELLY AND CHEST AT [facility], AND A PET SCAN AT [facility].  SHE SAID SHE IS HAVING A BONE SCAN ON 09/15/23.    SHE SAID SHE DOESN'T THINK SHE CAN DO THE MRI, UNLESS IT IS PUT OUT A WHILE AND SOMEONE HELPS TURN HER ON HER BELLY.

## 2023-09-13 ENCOUNTER — HOSPITAL ENCOUNTER (OUTPATIENT)
Dept: CARDIOLOGY | Facility: HOSPITAL | Age: 73
Discharge: HOME | End: 2023-09-13
Admitting: INTERNAL MEDICINE
Payer: MEDICARE

## 2023-09-13 DIAGNOSIS — C50.912 MALIGNANT NEOPLASM OF LEFT FEMALE BREAST, UNSPECIFIED ESTROGEN RECEPTOR STATUS, UNSPECIFIED SITE OF BREAST: ICD-10-CM

## 2023-09-13 LAB

## 2023-09-13 PROCEDURE — 93970 EXTREMITY STUDY: CPT

## 2023-09-14 ENCOUNTER — OFFICE VISIT (OUTPATIENT)
Dept: SURGERY | Facility: CLINIC | Age: 73
End: 2023-09-14
Payer: MEDICARE

## 2023-09-14 DIAGNOSIS — N64.89 BREAST ASYMMETRY IN FEMALE: Primary | ICD-10-CM

## 2023-09-14 DIAGNOSIS — R23.4 BREAST SKIN CHANGES: ICD-10-CM

## 2023-09-14 PROCEDURE — 1159F MED LIST DOCD IN RCRD: CPT | Performed by: SURGERY

## 2023-09-14 PROCEDURE — 1160F RVW MEDS BY RX/DR IN RCRD: CPT | Performed by: SURGERY

## 2023-09-14 PROCEDURE — 99212 OFFICE O/P EST SF 10 MIN: CPT | Performed by: SURGERY

## 2023-09-14 RX ORDER — METOLAZONE 5 MG/1
1 TABLET ORAL DAILY
COMMUNITY
Start: 2023-09-05

## 2023-09-14 RX ORDER — METHOCARBAMOL 750 MG/1
TABLET, FILM COATED ORAL
COMMUNITY
Start: 2023-09-06

## 2023-09-14 RX ORDER — FUROSEMIDE 80 MG
TABLET ORAL EVERY 24 HOURS
COMMUNITY

## 2023-09-14 RX ORDER — GABAPENTIN 300 MG/1
1 CAPSULE ORAL EVERY 24 HOURS
COMMUNITY

## 2023-09-14 NOTE — PROGRESS NOTES
Chief Complaint: Follow-up    Subjective         History of Present Illness  Lona Rodríguez is a 73 y.o. female presents to [facility] to be seen for breast skin changes and enlargement of left breast.  She has a hx of breast cancer in this breast in 1996.  Her imaging is shown below:    Narrative & Impression   PROCEDURE:  MAMMO DIAGNOSTIC DIGITAL TOMOSYNTHESIS BILATERAL W CAD, 7/12/2023, 13:52  US BREAST LEFT LIMITED, 7/12/2023, 14:12     COMPARISON:  [facility], , DIG DIAG LEFT JASVIR W 3D JM, 1/25/2021, 15:05.     VIEWS:  DIAGNOSTIC VIEWS WERE OBTAINED UTILIZING 3D TOMOSYNTHESIS AND R2 CAD SOFTWARE     INDICATIONS:  73-year-old female with remote history of left breast cancer/lumpectomy 1996/1997.    She presents for diagnostic evaluation of intermittent left breast swelling.     FINDINGS:          Technologist note:  Patient with limited range of motion.  Images obtained best possible.  Portions   of the breast nonvisualized cannot be evaluated.     Right breast:  Partially visualized right port catheter.  There are benign-type calcifications   within the right breast.  There are no dominant masses, suspicious microcalcifications or areas of   architectural distortion in the right breast.     Left breast:  Visualized left breast demonstrates progressive coarse calcifications within the   lumpectomy bed compatible with evolving fat necrosis.  There are numerous secretory type   calcifications within the left breast.  There is diffuse trabecular thickening and increased global   asymmetry of the visualized left breast some of which is likely from chronic post treatment   changes.  Superimposed edema or diffuse infiltrative neoplastic disease can have a similar   appearance.  There is chronic diffuse skin thickening.     High-resolution focused left breast ultrasound at the 12 o'clock position 8 cm from the nipple,   0300 hours 5-8 cm from the nipple, 0800 hours 8  cm from the nipple, 0600 hours  5 cm from the   nipple and 0900 hours 8 cm from the nipple, reported areas of breast swelling, demonstrate diffuse   breast edema.  No discrete mass or areas of abnormal shadowing.  There is moderate in skin   thickening at the 1200 hours and 0300 hours positions.     IMPRESSION:  Right breast:  Benign mammogram.  Recommend routine right breast screening.     Left breast:  Trabecular thickening and diffuse edema of the left breast with localized skin   thickening pronounced at the  hours positions.  Etiologies for breast edema and trabecular   thickening include congestive heart failure/problems with cardiac pump function.  The patient   reports six-month history of gradual increasing Lasix dosage to treat peripheral edema which she   reports has improved.  She also reports a history of shortness of breath on exertion which has also   improved with Lasix administration.  She has been scheduled for cardiac consultation 08/2023.  Other etiologies for trabecular thickening included diffuse neoplastic disease.  At this time   suggest short interval four-month mammographic and sonographic follow-up.  Pending cardiac   consultation results, the more immediate skin punch biopsy in the area of edema and or breast MRI   can be performed given high risk.  All findings and recommendations discussed directly with the   patient at the time of exam.     RECOMMENDATION(S):               CLINICAL EVALUATION.       SHORT TERM FOLLOW-UP DIAGNOSTIC MAMMOGRAM LEFT BREAST IN 4 MONTHS.       SHORT TERM FOLLOW-UP ULTRASOUND LEFT BREAST IN 4 MONTHS.       BIRADS:               DIAGNOSTIC CATEGORY 3--PROBABLY BENIGN FINDING.       BREAST COMPOSITION:          Scattered areas fibroglandular density.          She was unable to get her MRI but was able to get her PET scan.      Narrative & Impression   PROCEDURE:  NM PET SKULL BASE TO MID THIGH     COMPARISON: None     INDICATIONS:  NEOPLASM OF LT  BREAST     TECHNIQUE:    After obtaining the patient's consent, F-18 FDG was administered intravenously.  PET/CT   imaging was performed from skull to thigh with multi-planar imaging without oral or intravenous   contrast material, using a dedicated integrated PET/CT scanner.       RADIONUCLIDE:     9.32 MCI   F18 FDG- I.V.  LABS:                          Blood Glucose 122 mg/dl  UPTAKE TIME:        49 mins         MEDIASTINAL BACKGROUND UPTAKE:  2.1, background liver parenchyma max SUV 2.7.     FINDINGS:          HEAD/NECK:    No suspicious FDG avid mass.  Very small mildly hypermetabolic left cervical lymph nodes   for example 7 mm short axis level 1 B node image 40 series 202 max SUV 1.9 and 0.6 cm short axis   left level 2 lymph node max SUV 3.2.  No suspicious adenopathy.  Carotid atherosclerotic disease   with partial retropharyngeal course.  LUNGS:             No suspicious FDG avid mass.  Left pleural based calcifications with associated thickening   and probable small nodular area of round atelectasis image 112 series 202 measuring 0.9 x 1.7 cm.    This could represent sequelae of prior infectious/inflammatory process versus prior trauma or   surgical changes.  MEDIASTINUM/MERRICK:    Mild cardiomegaly.  Aortic atherosclerotic disease without aneurysm.  Moderate to   large sliding hiatal hernia.  No suspicious FDG avid mass.  Right-sided chest port terminates   within mid SVC.  CHEST WALL/AXILLA:  No suspicious FDG avid mass.  No suspicious FDG avid adenopathy.  Linear   calcifications in the left breast.  ABDOMEN:       No suspicious FDG avid mass.  Physiologic uptake in the renal collecting system and GI   tract.  Multiple duodenal diverticula.  Fatty infiltration and atrophy of the pancreas.  Aortic   atherosclerotic disease.  Colonic diverticulosis.  PELVIS:             Multilocular cystic lesion in the right adnexa measuring up to 5.5 x 4.6 cm without   suspicious FDG activity this is incompletely  evaluated on this noncontrast PET-CT.  BONES:             No FDG avid destructive bone lesion.  Old healed left pubic ramus fractures.  Healed left   iliac wing deformity.  Multiple contiguous nondisplaced right-sided rib fractures, sclerosis and   callus formation suggest at most a subacute to chronic process.  Mild FDG activity associated with   the right anterior 8th rib max SUV 3.5.  Degenerative related uptake in the bilateral glenohumeral   joints.  Multilevel lumbar spondylosis noted.  OTHER:             Negative.       IMPRESSION:                 1. No suspicious FDG avid mass or adenopathy to suggest metastatic disease.  2. Small mildly hypermetabolic left level 1B and 2 cervical lymph nodes, almost certainly   benign/reactive.  3. Multiple contiguous nondisplaced right-sided rib fractures, likely subacute to chronic in   etiology.  Mild FDG activity within the right anterior 8th rib without underlying bone lesion,   possibly a more recent nondisplaced fracture.  Recommend correlation with point tenderness.          We discussed options including skin punch biopsy vs repeat imaging and she would like to pursue the repeat imaging as she feels that her breast is unchanged over the last 2 or so years.       Objective     Past Medical History:   Diagnosis Date    AMD (age related macular degeneration)     Anemia Following surgery    Arthritis     Asthma     Breast cancer Left breast.    Breast mass 2 lumpectomies 1 cancerous 1 benign    CHF (congestive heart failure) Just getting evaluation    Colon polyp Removed during colonos    COPD (chronic obstructive pulmonary disease)     Coronary artery disease Chf    Deep vein thrombosis 1979    Fibrocystic breast Left breast    Fibromyalgia     History of transfusion Aug 2019 2 units    Hyperlipidemia     Hypertension     Obesity     Polymyalgia arteritica     Postoperative wound infection Lymph node resection site,5th toe left foot infection after neuroma surgery.  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    Sciatica        Past Surgical History:   Procedure Laterality Date    BREAST BIOPSY  Left    BREAST SURGERY  Lumpectomies left breast    EYE SURGERY  Lens implants both eyes    REPLACEMENT TOTAL KNEE      TONSILLECTOMY           Current Outpatient Medications:     amitriptyline (ELAVIL) 50 MG tablet, Take 1 tablet by mouth Every Night., Disp: , Rfl:     atorvastatin (LIPITOR) 20 MG tablet, Take 1 tablet by mouth Daily., Disp: , Rfl:     Budeson-Glycopyrrol-Formoterol (Breztri Aerosphere) 160-9-4.8 MCG/ACT aerosol inhaler, Inhale 2 puffs 2 (Two) Times a Day., Disp: , Rfl:     furosemide (LASIX) 40 MG tablet, Take 1 tablet by mouth Every 12 (Twelve) Hours., Disp: , Rfl:     furosemide (LASIX) 80 MG tablet, Daily., Disp: , Rfl:     gabapentin (NEURONTIN) 300 MG capsule, 1 capsule Daily., Disp: , Rfl:     irbesartan (AVAPRO) 300 MG tablet, Take 1 tablet by mouth Every Night., Disp: , Rfl:     KLOR-CON 10 MEQ CR tablet, Every 12 (Twelve) Hours., Disp: , Rfl:     levothyroxine (SYNTHROID, LEVOTHROID) 75 MCG tablet, Take 1 tablet by mouth Daily., Disp: , Rfl:     methocarbamol (ROBAXIN) 500 MG tablet, Take 1 tablet by mouth Every 12 (Twelve) Hours., Disp: , Rfl:     methocarbamol (ROBAXIN) 750 MG tablet, , Disp: , Rfl:     metOLazone (ZAROXOLYN) 5 MG tablet, Take 1 tablet by mouth Daily., Disp: , Rfl:     montelukast (SINGULAIR) 10 MG tablet, Take 1 tablet by mouth Every Night., Disp: , Rfl:     omeprazole (priLOSEC) 40 MG capsule, Take 1 capsule by mouth Daily., Disp: , Rfl:     ondansetron (ZOFRAN) 4 MG tablet, Every 8 (Eight) Hours., Disp: , Rfl:     potassium chloride 10 MEQ CR tablet, Take 1 tablet by mouth Every 12 (Twelve) Hours., Disp: , Rfl:     predniSONE (DELTASONE) 20 MG tablet, TAKE ONE TABLET BY MOUTH EVERY DAY for 7 days, Disp: , Rfl:     triamcinolone (KENALOG) 0.1 % ointment, applied topically 3 times a day for 7 day(s)], Disp: , Rfl:     vitamin D (ERGOCALCIFEROL) 1.25 MG  (16336 UT) capsule capsule, Take 1 capsule by mouth 2 (Two) Times a Week., Disp: , Rfl:     gabapentin (NEURONTIN) 300 MG capsule, Take 1 capsule by mouth 3 (Three) Times a Day for 30 days., Disp: 90 capsule, Rfl: 0    HYDROcodone-acetaminophen (NORCO) 7.5-325 MG per tablet, Take 1 tablet by mouth Every 6 (Six) Hours. (Patient not taking: Reported on 9/14/2023), Disp: , Rfl:     ipratropium-albuterol (DUO-NEB) 0.5-2.5 mg/3 ml nebulizer, Take 3 mL by nebulization Every 4 (Four) Hours for 30 days., Disp: 540 mL, Rfl: 0    Allergies   Allergen Reactions    Morphine Anaphylaxis        Family History   Problem Relation Age of Onset    Arthritis Mother     Cancer Mother     Heart disease Mother     Arthritis Father     COPD Father     Arthritis Brother     Diabetes Brother     Learning disabilities Brother         Dyslexic    Mental illness Maternal Aunt         Schizophrenic       Social History     Socioeconomic History    Marital status: Single   Tobacco Use    Smoking status: Never    Smokeless tobacco: Never    Tobacco comments:     Grew up with [id number]   Vaping Use    Vaping Use: Never used   Substance and Sexual Activity    Alcohol use: Never    Drug use: Never    Sexual activity: Not Currently     Partners: Male     Birth control/protection: Diaphragm, Abstinence       Vital Signs:   There were no vitals taken for this visit.   Review of Systems    Physical Exam  Vitals and nursing note reviewed.   Constitutional:       Appearance: Normal appearance.   HENT:      Head: Normocephalic and atraumatic.   Eyes:      Extraocular Movements: Extraocular movements intact.      Pupils: Pupils are equal, round, and reactive to light.   Cardiovascular:      Pulses: Normal pulses.   Pulmonary:      Effort: Pulmonary effort is normal. No accessory muscle usage or respiratory distress.   Chest:   Breasts:     Right: Normal. No inverted nipple, nipple discharge or skin change.      Left: Normal. Swelling and skin change present.  No inverted nipple, mass or nipple discharge.      Comments: The left breast is tense and swollen and grossly abnormal appearing when compared to the right.    Abdominal:      General: Abdomen is flat.      Palpations: Abdomen is soft.      Tenderness: There is no abdominal tenderness. There is no guarding.   Musculoskeletal:         General: No swelling, tenderness or deformity.      Cervical back: Neck supple.   Lymphadenopathy:      Upper Body:      Right upper body: No supraclavicular or axillary adenopathy.      Left upper body: No supraclavicular or axillary adenopathy.   Skin:     General: Skin is warm and dry.   Neurological:      General: No focal deficit present.      Mental Status: She is alert and oriented to person, place, and time.   Psychiatric:         Mood and Affect: Mood normal.         Thought Content: Thought content normal.        Result Review :               Assessment and Plan    Diagnoses and all orders for this visit:    1. Breast asymmetry in female (Primary)  -     US Breast Left Limited; Future  -     Mammo Diagnostic Left With CAD; Future    2. Breast skin changes    Will followup with her in Nov after her repeat imaging    Follow Up   Return in about 2 months (around 11/29/2023).  Patient was given instructions and counseling regarding her condition or for health maintenance advice. Please see specific information pulled into the AVS if appropriate.         This document has been electronically signed by Lara Terry MD  September 14, 2023 12:01 EDT

## 2023-09-18 DIAGNOSIS — N64.89 BREAST ASYMMETRY: ICD-10-CM

## 2023-09-18 DIAGNOSIS — R23.4 BREAST SKIN CHANGES: Primary | ICD-10-CM

## 2023-09-26 ENCOUNTER — HOSPITAL ENCOUNTER (OUTPATIENT)
Dept: NUCLEAR MEDICINE | Facility: HOSPITAL | Age: 73
Discharge: HOME | End: 2023-09-26
Payer: MEDICARE

## 2023-09-26 DIAGNOSIS — C50.912 MALIGNANT NEOPLASM OF LEFT FEMALE BREAST, UNSPECIFIED ESTROGEN RECEPTOR STATUS, UNSPECIFIED SITE OF BREAST: ICD-10-CM

## 2023-09-26 PROCEDURE — A9503 TC99M MEDRONATE: HCPCS | Performed by: INTERNAL MEDICINE

## 2023-09-26 PROCEDURE — 78306 BONE IMAGING WHOLE BODY: CPT

## 2023-09-26 PROCEDURE — 0 TECHNETIUM MEDRONATE KIT: Performed by: INTERNAL MEDICINE

## 2023-09-26 RX ORDER — TC 99M MEDRONATE 20 MG/10ML
20.5 INJECTION, POWDER, LYOPHILIZED, FOR SOLUTION INTRAVENOUS
Status: COMPLETED | OUTPATIENT
Start: 2023-09-26 | End: 2023-09-26

## 2023-09-26 RX ADMIN — TC 99M MEDRONATE 20.5 MILLICURIE: 20 INJECTION, POWDER, LYOPHILIZED, FOR SOLUTION INTRAVENOUS at 13:05

## 2023-09-28 ENCOUNTER — DOCUMENTATION (OUTPATIENT)
Dept: SURGERY | Facility: CLINIC | Age: 73
End: 2023-09-28
Payer: MEDICARE

## 2023-11-15 ENCOUNTER — HOSPITAL ENCOUNTER (OUTPATIENT)
Dept: MAMMOGRAPHY | Facility: HOSPITAL | Age: 73
Discharge: HOME | End: 2023-11-15
Admitting: SURGERY
Payer: MEDICARE

## 2023-11-15 ENCOUNTER — HOSPITAL ENCOUNTER (OUTPATIENT)
Dept: ULTRASOUND IMAGING | Facility: HOSPITAL | Age: 73
Discharge: HOME | End: 2023-11-15
Payer: MEDICARE

## 2023-11-15 DIAGNOSIS — N64.89 BREAST ASYMMETRY: ICD-10-CM

## 2023-11-15 DIAGNOSIS — R23.4 BREAST SKIN CHANGES: ICD-10-CM

## 2023-11-15 DIAGNOSIS — N64.89 BREAST ASYMMETRY IN FEMALE: ICD-10-CM

## 2023-11-15 PROCEDURE — 77065 DX MAMMO INCL CAD UNI: CPT

## 2023-11-15 PROCEDURE — G0279 TOMOSYNTHESIS, MAMMO: HCPCS

## 2023-11-22 ENCOUNTER — TELEPHONE (OUTPATIENT)
Dept: SURGERY | Facility: CLINIC | Age: 73
End: 2023-11-22

## 2023-11-22 NOTE — TELEPHONE ENCOUNTER
Patient called to know when she comes in for the punch biopsy on 11/29/23 if she will be lying down for that or sitting up? She stated she can not lay on a table because of a recent fall. She wants to know if she will be able to have this done sitting up?

## 2023-11-28 ENCOUNTER — APPOINTMENT (OUTPATIENT)
Dept: CT IMAGING | Facility: HOSPITAL | Age: 73
DRG: 641 | End: 2023-11-28
Payer: MEDICARE

## 2023-11-28 ENCOUNTER — HOSPITAL ENCOUNTER (INPATIENT)
Facility: HOSPITAL | Age: 73
LOS: 7 days | Discharge: HOME HEALTH | DRG: 641 | End: 2023-12-05
Attending: EMERGENCY MEDICINE | Admitting: INTERNAL MEDICINE
Payer: MEDICARE

## 2023-11-28 ENCOUNTER — APPOINTMENT (OUTPATIENT)
Dept: GENERAL RADIOLOGY | Facility: HOSPITAL | Age: 73
DRG: 641 | End: 2023-11-28
Payer: MEDICARE

## 2023-11-28 DIAGNOSIS — R53.1 WEAKNESS: ICD-10-CM

## 2023-11-28 DIAGNOSIS — E87.6 HYPOKALEMIA: Primary | ICD-10-CM

## 2023-11-28 DIAGNOSIS — R26.2 DIFFICULTY WALKING: ICD-10-CM

## 2023-11-28 DIAGNOSIS — R11.14 BILIOUS VOMITING WITH NAUSEA: ICD-10-CM

## 2023-11-28 LAB
ALBUMIN SERPL-MCNC: 3 G/DL (ref 3.5–5.2)
ALBUMIN/GLOB SERPL: 1 G/DL
ALP SERPL-CCNC: 161 U/L (ref 39–117)
ALT SERPL W P-5'-P-CCNC: 9 U/L (ref 1–33)
ANION GAP SERPL CALCULATED.3IONS-SCNC: 16.9 MMOL/L (ref 5–15)
AST SERPL-CCNC: 23 U/L (ref 1–32)
BASOPHILS # BLD AUTO: 0.03 10*3/MM3 (ref 0–0.2)
BASOPHILS NFR BLD AUTO: 0.2 % (ref 0–1.5)
BILIRUB SERPL-MCNC: 0.7 MG/DL (ref 0–1.2)
BUN SERPL-MCNC: 10 MG/DL (ref 8–23)
BUN/CREAT SERPL: 15.6 (ref 7–25)
CALCIUM SPEC-SCNC: 8.7 MG/DL (ref 8.6–10.5)
CHLORIDE SERPL-SCNC: 76 MMOL/L (ref 98–107)
CO2 SERPL-SCNC: 34.1 MMOL/L (ref 22–29)
CREAT SERPL-MCNC: 0.64 MG/DL (ref 0.57–1)
DEPRECATED RDW RBC AUTO: 46.5 FL (ref 37–54)
EGFRCR SERPLBLD CKD-EPI 2021: 93.4 ML/MIN/1.73
EOSINOPHIL # BLD AUTO: 0.08 10*3/MM3 (ref 0–0.4)
EOSINOPHIL NFR BLD AUTO: 0.6 % (ref 0.3–6.2)
ERYTHROCYTE [DISTWIDTH] IN BLOOD BY AUTOMATED COUNT: 15.1 % (ref 12.3–15.4)
GLOBULIN UR ELPH-MCNC: 3.1 GM/DL
GLUCOSE SERPL-MCNC: 82 MG/DL (ref 65–99)
HCT VFR BLD AUTO: 38.9 % (ref 34–46.6)
HGB BLD-MCNC: 13.3 G/DL (ref 12–15.9)
HOLD SPECIMEN: NORMAL
HOLD SPECIMEN: NORMAL
IMM GRANULOCYTES # BLD AUTO: 0.11 10*3/MM3 (ref 0–0.05)
IMM GRANULOCYTES NFR BLD AUTO: 0.8 % (ref 0–0.5)
LYMPHOCYTES # BLD AUTO: 1.12 10*3/MM3 (ref 0.7–3.1)
LYMPHOCYTES NFR BLD AUTO: 8.5 % (ref 19.6–45.3)
MAGNESIUM SERPL-MCNC: 1.9 MG/DL (ref 1.6–2.4)
MCH RBC QN AUTO: 28.7 PG (ref 26.6–33)
MCHC RBC AUTO-ENTMCNC: 34.2 G/DL (ref 31.5–35.7)
MCV RBC AUTO: 84 FL (ref 79–97)
MONOCYTES # BLD AUTO: 1.41 10*3/MM3 (ref 0.1–0.9)
MONOCYTES NFR BLD AUTO: 10.7 % (ref 5–12)
NEUTROPHILS NFR BLD AUTO: 10.48 10*3/MM3 (ref 1.7–7)
NEUTROPHILS NFR BLD AUTO: 79.2 % (ref 42.7–76)
NRBC BLD AUTO-RTO: 0 /100 WBC (ref 0–0.2)
PLATELET # BLD AUTO: 433 10*3/MM3 (ref 140–450)
PMV BLD AUTO: 8.8 FL (ref 6–12)
POTASSIUM SERPL-SCNC: 2.3 MMOL/L (ref 3.5–5.2)
PROT SERPL-MCNC: 6.1 G/DL (ref 6–8.5)
RBC # BLD AUTO: 4.63 10*6/MM3 (ref 3.77–5.28)
SODIUM SERPL-SCNC: 127 MMOL/L (ref 136–145)
TROPONIN T SERPL HS-MCNC: 18 NG/L
WBC NRBC COR # BLD AUTO: 13.23 10*3/MM3 (ref 3.4–10.8)
WHOLE BLOOD HOLD COAG: NORMAL
WHOLE BLOOD HOLD SPECIMEN: NORMAL

## 2023-11-28 PROCEDURE — 93005 ELECTROCARDIOGRAM TRACING: CPT | Performed by: EMERGENCY MEDICINE

## 2023-11-28 PROCEDURE — 83735 ASSAY OF MAGNESIUM: CPT

## 2023-11-28 PROCEDURE — 84484 ASSAY OF TROPONIN QUANT: CPT

## 2023-11-28 PROCEDURE — 99285 EMERGENCY DEPT VISIT HI MDM: CPT

## 2023-11-28 PROCEDURE — 93010 ELECTROCARDIOGRAM REPORT: CPT | Performed by: SPECIALIST

## 2023-11-28 PROCEDURE — 25010000002 POTASSIUM CHLORIDE 10 MEQ/100ML SOLUTION

## 2023-11-28 PROCEDURE — 25510000001 IOPAMIDOL PER 1 ML: Performed by: EMERGENCY MEDICINE

## 2023-11-28 PROCEDURE — 25010000002 ONDANSETRON PER 1 MG

## 2023-11-28 PROCEDURE — 25010000002 METOCLOPRAMIDE PER 10 MG

## 2023-11-28 PROCEDURE — 25810000003 SODIUM CHLORIDE 0.9 % SOLUTION

## 2023-11-28 PROCEDURE — 93005 ELECTROCARDIOGRAM TRACING: CPT

## 2023-11-28 PROCEDURE — 70450 CT HEAD/BRAIN W/O DYE: CPT

## 2023-11-28 PROCEDURE — 71045 X-RAY EXAM CHEST 1 VIEW: CPT

## 2023-11-28 PROCEDURE — 71260 CT THORAX DX C+: CPT

## 2023-11-28 PROCEDURE — 85025 COMPLETE CBC W/AUTO DIFF WBC: CPT

## 2023-11-28 PROCEDURE — 36415 COLL VENOUS BLD VENIPUNCTURE: CPT

## 2023-11-28 PROCEDURE — 80053 COMPREHEN METABOLIC PANEL: CPT

## 2023-11-28 RX ORDER — POTASSIUM CHLORIDE 7.45 MG/ML
10 INJECTION INTRAVENOUS ONCE
Status: COMPLETED | OUTPATIENT
Start: 2023-11-28 | End: 2023-11-28

## 2023-11-28 RX ORDER — ONDANSETRON 2 MG/ML
4 INJECTION INTRAMUSCULAR; INTRAVENOUS ONCE
Status: COMPLETED | OUTPATIENT
Start: 2023-11-28 | End: 2023-11-28

## 2023-11-28 RX ORDER — SODIUM CHLORIDE 0.9 % (FLUSH) 0.9 %
10 SYRINGE (ML) INJECTION AS NEEDED
Status: DISCONTINUED | OUTPATIENT
Start: 2023-11-28 | End: 2023-12-05 | Stop reason: HOSPADM

## 2023-11-28 RX ORDER — METOCLOPRAMIDE HYDROCHLORIDE 5 MG/ML
10 INJECTION INTRAMUSCULAR; INTRAVENOUS ONCE
Status: COMPLETED | OUTPATIENT
Start: 2023-11-28 | End: 2023-11-28

## 2023-11-28 RX ORDER — POTASSIUM CHLORIDE 750 MG/1
40 CAPSULE, EXTENDED RELEASE ORAL ONCE
Status: COMPLETED | OUTPATIENT
Start: 2023-11-28 | End: 2023-11-28

## 2023-11-28 RX ORDER — POTASSIUM CHLORIDE 7.45 MG/ML
10 INJECTION INTRAVENOUS ONCE
Status: COMPLETED | OUTPATIENT
Start: 2023-11-28 | End: 2023-11-29

## 2023-11-28 RX ADMIN — IOPAMIDOL 50 ML: 755 INJECTION, SOLUTION INTRAVENOUS at 21:47

## 2023-11-28 RX ADMIN — POTASSIUM CHLORIDE 10 MEQ: 7.46 INJECTION, SOLUTION INTRAVENOUS at 23:27

## 2023-11-28 RX ADMIN — ONDANSETRON 4 MG: 2 INJECTION INTRAMUSCULAR; INTRAVENOUS at 21:51

## 2023-11-28 RX ADMIN — SODIUM CHLORIDE 1000 ML: 9 INJECTION, SOLUTION INTRAVENOUS at 21:52

## 2023-11-28 RX ADMIN — METOCLOPRAMIDE HYDROCHLORIDE 10 MG: 5 INJECTION INTRAMUSCULAR; INTRAVENOUS at 23:12

## 2023-11-28 RX ADMIN — POTASSIUM CHLORIDE 10 MEQ: 7.46 INJECTION, SOLUTION INTRAVENOUS at 21:54

## 2023-11-28 RX ADMIN — POTASSIUM CHLORIDE 40 MEQ: 10 CAPSULE, COATED, EXTENDED RELEASE ORAL at 21:51

## 2023-11-29 PROBLEM — R11.2 NAUSEA AND VOMITING: Chronic | Status: ACTIVE | Noted: 2023-11-29

## 2023-11-29 PROBLEM — R42 DIZZINESS: Status: ACTIVE | Noted: 2023-11-29

## 2023-11-29 LAB
ALBUMIN SERPL-MCNC: 2.4 G/DL (ref 3.5–5.2)
ALBUMIN/GLOB SERPL: 0.9 G/DL
ALP SERPL-CCNC: 132 U/L (ref 39–117)
ALT SERPL W P-5'-P-CCNC: 8 U/L (ref 1–33)
ANION GAP SERPL CALCULATED.3IONS-SCNC: 13.6 MMOL/L (ref 5–15)
AST SERPL-CCNC: 17 U/L (ref 1–32)
BACTERIA UR QL AUTO: NORMAL /HPF
BASOPHILS # BLD AUTO: 0.03 10*3/MM3 (ref 0–0.2)
BASOPHILS NFR BLD AUTO: 0.2 % (ref 0–1.5)
BILIRUB SERPL-MCNC: 0.7 MG/DL (ref 0–1.2)
BILIRUB UR QL STRIP: NEGATIVE
BUN SERPL-MCNC: 9 MG/DL (ref 8–23)
BUN/CREAT SERPL: 13.4 (ref 7–25)
CALCIUM SPEC-SCNC: 7.6 MG/DL (ref 8.6–10.5)
CHLORIDE SERPL-SCNC: 84 MMOL/L (ref 98–107)
CLARITY UR: CLEAR
CO2 SERPL-SCNC: 30.4 MMOL/L (ref 22–29)
COLOR UR: YELLOW
CREAT SERPL-MCNC: 0.67 MG/DL (ref 0.57–1)
DEPRECATED RDW RBC AUTO: 54.3 FL (ref 37–54)
EGFRCR SERPLBLD CKD-EPI 2021: 92.4 ML/MIN/1.73
EOSINOPHIL # BLD AUTO: 0.01 10*3/MM3 (ref 0–0.4)
EOSINOPHIL NFR BLD AUTO: 0.1 % (ref 0.3–6.2)
ERYTHROCYTE [DISTWIDTH] IN BLOOD BY AUTOMATED COUNT: 16.4 % (ref 12.3–15.4)
GLOBULIN UR ELPH-MCNC: 2.7 GM/DL
GLUCOSE SERPL-MCNC: 95 MG/DL (ref 65–99)
GLUCOSE UR STRIP-MCNC: NEGATIVE MG/DL
HCT VFR BLD AUTO: 37.9 % (ref 34–46.6)
HGB BLD-MCNC: 12.2 G/DL (ref 12–15.9)
HGB UR QL STRIP.AUTO: NEGATIVE
HYALINE CASTS UR QL AUTO: NORMAL /LPF
IMM GRANULOCYTES # BLD AUTO: 0.1 10*3/MM3 (ref 0–0.05)
IMM GRANULOCYTES NFR BLD AUTO: 0.8 % (ref 0–0.5)
KETONES UR QL STRIP: ABNORMAL
LEUKOCYTE ESTERASE UR QL STRIP.AUTO: ABNORMAL
LYMPHOCYTES # BLD AUTO: 1.31 10*3/MM3 (ref 0.7–3.1)
LYMPHOCYTES NFR BLD AUTO: 10.5 % (ref 19.6–45.3)
MCH RBC QN AUTO: 29.2 PG (ref 26.6–33)
MCHC RBC AUTO-ENTMCNC: 32.2 G/DL (ref 31.5–35.7)
MCV RBC AUTO: 90.7 FL (ref 79–97)
MONOCYTES # BLD AUTO: 1.4 10*3/MM3 (ref 0.1–0.9)
MONOCYTES NFR BLD AUTO: 11.3 % (ref 5–12)
NEUTROPHILS NFR BLD AUTO: 77.1 % (ref 42.7–76)
NEUTROPHILS NFR BLD AUTO: 9.59 10*3/MM3 (ref 1.7–7)
NITRITE UR QL STRIP: NEGATIVE
NRBC BLD AUTO-RTO: 0 /100 WBC (ref 0–0.2)
PH UR STRIP.AUTO: 5.5 [PH] (ref 5–8)
PLAT MORPH BLD: NORMAL
PLATELET # BLD AUTO: 264 10*3/MM3 (ref 140–450)
PMV BLD AUTO: 11 FL (ref 6–12)
POTASSIUM SERPL-SCNC: 2.8 MMOL/L (ref 3.5–5.2)
PROT SERPL-MCNC: 5.1 G/DL (ref 6–8.5)
PROT UR QL STRIP: NEGATIVE
QT INTERVAL: 378 MS
QTC INTERVAL: 486 MS
RBC # BLD AUTO: 4.18 10*6/MM3 (ref 3.77–5.28)
RBC # UR STRIP: NORMAL /HPF
RBC MORPH BLD: NORMAL
REF LAB TEST METHOD: NORMAL
SODIUM SERPL-SCNC: 128 MMOL/L (ref 136–145)
SP GR UR STRIP: 1.02 (ref 1–1.03)
SQUAMOUS #/AREA URNS HPF: NORMAL /HPF
UROBILINOGEN UR QL STRIP: ABNORMAL
WBC # UR STRIP: NORMAL /HPF
WBC MORPH BLD: NORMAL
WBC NRBC COR # BLD AUTO: 12.44 10*3/MM3 (ref 3.4–10.8)

## 2023-11-29 PROCEDURE — 25010000002 ENOXAPARIN PER 10 MG: Performed by: INTERNAL MEDICINE

## 2023-11-29 PROCEDURE — 25010000002 POTASSIUM CHLORIDE 10 MEQ/100ML SOLUTION: Performed by: INTERNAL MEDICINE

## 2023-11-29 PROCEDURE — 94799 UNLISTED PULMONARY SVC/PX: CPT

## 2023-11-29 PROCEDURE — 85007 BL SMEAR W/DIFF WBC COUNT: CPT | Performed by: INTERNAL MEDICINE

## 2023-11-29 PROCEDURE — 94640 AIRWAY INHALATION TREATMENT: CPT

## 2023-11-29 PROCEDURE — 81001 URINALYSIS AUTO W/SCOPE: CPT | Performed by: INTERNAL MEDICINE

## 2023-11-29 PROCEDURE — 94664 DEMO&/EVAL PT USE INHALER: CPT

## 2023-11-29 PROCEDURE — 80053 COMPREHEN METABOLIC PANEL: CPT | Performed by: INTERNAL MEDICINE

## 2023-11-29 PROCEDURE — 25010000002 ONDANSETRON PER 1 MG: Performed by: INTERNAL MEDICINE

## 2023-11-29 PROCEDURE — 85025 COMPLETE CBC W/AUTO DIFF WBC: CPT | Performed by: INTERNAL MEDICINE

## 2023-11-29 PROCEDURE — 25010000002 SODIUM CHLORIDE 0.9 % WITH KCL 20 MEQ 20-0.9 MEQ/L-% SOLUTION: Performed by: INTERNAL MEDICINE

## 2023-11-29 RX ORDER — ACETAMINOPHEN 160 MG/5ML
650 SOLUTION ORAL EVERY 4 HOURS PRN
Status: DISCONTINUED | OUTPATIENT
Start: 2023-11-29 | End: 2023-12-05 | Stop reason: HOSPADM

## 2023-11-29 RX ORDER — POTASSIUM CHLORIDE 7.45 MG/ML
10 INJECTION INTRAVENOUS
Status: COMPLETED | OUTPATIENT
Start: 2023-11-29 | End: 2023-11-29

## 2023-11-29 RX ORDER — GABAPENTIN 300 MG/1
300 CAPSULE ORAL 3 TIMES DAILY
Status: DISCONTINUED | OUTPATIENT
Start: 2023-11-29 | End: 2023-11-29

## 2023-11-29 RX ORDER — SODIUM CHLORIDE 0.9 % (FLUSH) 0.9 %
10 SYRINGE (ML) INJECTION EVERY 12 HOURS SCHEDULED
Status: DISCONTINUED | OUTPATIENT
Start: 2023-11-29 | End: 2023-12-05 | Stop reason: HOSPADM

## 2023-11-29 RX ORDER — AMITRIPTYLINE HYDROCHLORIDE 50 MG/1
50 TABLET, FILM COATED ORAL NIGHTLY
Status: DISCONTINUED | OUTPATIENT
Start: 2023-11-29 | End: 2023-12-05 | Stop reason: HOSPADM

## 2023-11-29 RX ORDER — POTASSIUM CHLORIDE 7.45 MG/ML
10 INJECTION INTRAVENOUS
Status: DISPENSED | OUTPATIENT
Start: 2023-11-29 | End: 2023-11-29

## 2023-11-29 RX ORDER — NITROGLYCERIN 0.4 MG/1
0.4 TABLET SUBLINGUAL
Status: DISCONTINUED | OUTPATIENT
Start: 2023-11-29 | End: 2023-12-05 | Stop reason: HOSPADM

## 2023-11-29 RX ORDER — POTASSIUM CHLORIDE 750 MG/1
20 CAPSULE, EXTENDED RELEASE ORAL 2 TIMES DAILY WITH MEALS
Status: DISPENSED | OUTPATIENT
Start: 2023-11-29 | End: 2023-12-02

## 2023-11-29 RX ORDER — BISACODYL 5 MG/1
5 TABLET, DELAYED RELEASE ORAL DAILY PRN
Status: DISCONTINUED | OUTPATIENT
Start: 2023-11-29 | End: 2023-12-05 | Stop reason: HOSPADM

## 2023-11-29 RX ORDER — ACETAMINOPHEN 325 MG/1
650 TABLET ORAL EVERY 6 HOURS PRN
COMMUNITY

## 2023-11-29 RX ORDER — POLYETHYLENE GLYCOL 3350 17 G/17G
17 POWDER, FOR SOLUTION ORAL DAILY PRN
Status: DISCONTINUED | OUTPATIENT
Start: 2023-11-29 | End: 2023-12-05 | Stop reason: HOSPADM

## 2023-11-29 RX ORDER — LEVOTHYROXINE SODIUM 0.07 MG/1
75 TABLET ORAL
Status: DISCONTINUED | OUTPATIENT
Start: 2023-11-29 | End: 2023-12-05 | Stop reason: HOSPADM

## 2023-11-29 RX ORDER — HYDROCODONE BITARTRATE AND ACETAMINOPHEN 7.5; 325 MG/1; MG/1
1 TABLET ORAL EVERY 6 HOURS PRN
Status: DISCONTINUED | OUTPATIENT
Start: 2023-11-29 | End: 2023-12-05 | Stop reason: HOSPADM

## 2023-11-29 RX ORDER — BISACODYL 10 MG
10 SUPPOSITORY, RECTAL RECTAL DAILY PRN
Status: DISCONTINUED | OUTPATIENT
Start: 2023-11-29 | End: 2023-12-05 | Stop reason: HOSPADM

## 2023-11-29 RX ORDER — AMOXICILLIN 250 MG
2 CAPSULE ORAL NIGHTLY
Status: DISCONTINUED | OUTPATIENT
Start: 2023-11-29 | End: 2023-12-05 | Stop reason: HOSPADM

## 2023-11-29 RX ORDER — ONDANSETRON 2 MG/ML
4 INJECTION INTRAMUSCULAR; INTRAVENOUS EVERY 6 HOURS PRN
Status: DISCONTINUED | OUTPATIENT
Start: 2023-11-29 | End: 2023-12-05 | Stop reason: HOSPADM

## 2023-11-29 RX ORDER — METHOCARBAMOL 500 MG/1
500 TABLET, FILM COATED ORAL EVERY 12 HOURS SCHEDULED
Status: DISCONTINUED | OUTPATIENT
Start: 2023-11-29 | End: 2023-12-05 | Stop reason: HOSPADM

## 2023-11-29 RX ORDER — IPRATROPIUM BROMIDE AND ALBUTEROL SULFATE 2.5; .5 MG/3ML; MG/3ML
3 SOLUTION RESPIRATORY (INHALATION)
Status: DISCONTINUED | OUTPATIENT
Start: 2023-11-29 | End: 2023-11-29

## 2023-11-29 RX ORDER — IPRATROPIUM BROMIDE AND ALBUTEROL SULFATE 2.5; .5 MG/3ML; MG/3ML
3 SOLUTION RESPIRATORY (INHALATION)
Status: DISCONTINUED | OUTPATIENT
Start: 2023-11-29 | End: 2023-12-04

## 2023-11-29 RX ORDER — MECLIZINE HYDROCHLORIDE 25 MG/1
25 TABLET ORAL 3 TIMES DAILY PRN
COMMUNITY
End: 2023-12-05 | Stop reason: HOSPADM

## 2023-11-29 RX ORDER — AMOXICILLIN AND CLAVULANATE POTASSIUM 875; 125 MG/1; MG/1
1 TABLET, FILM COATED ORAL EVERY 12 HOURS SCHEDULED
Status: DISCONTINUED | OUTPATIENT
Start: 2023-11-29 | End: 2023-12-05 | Stop reason: HOSPADM

## 2023-11-29 RX ORDER — ACETAMINOPHEN 325 MG/1
650 TABLET ORAL EVERY 4 HOURS PRN
Status: DISCONTINUED | OUTPATIENT
Start: 2023-11-29 | End: 2023-12-05 | Stop reason: HOSPADM

## 2023-11-29 RX ORDER — ENOXAPARIN SODIUM 100 MG/ML
40 INJECTION SUBCUTANEOUS DAILY
Status: DISCONTINUED | OUTPATIENT
Start: 2023-11-29 | End: 2023-12-05 | Stop reason: HOSPADM

## 2023-11-29 RX ORDER — SODIUM CHLORIDE AND POTASSIUM CHLORIDE 150; 900 MG/100ML; MG/100ML
50 INJECTION, SOLUTION INTRAVENOUS CONTINUOUS
Status: DISCONTINUED | OUTPATIENT
Start: 2023-11-29 | End: 2023-12-04

## 2023-11-29 RX ORDER — FAMOTIDINE 10 MG/ML
20 INJECTION, SOLUTION INTRAVENOUS EVERY 12 HOURS SCHEDULED
Status: DISCONTINUED | OUTPATIENT
Start: 2023-11-29 | End: 2023-12-02

## 2023-11-29 RX ORDER — SODIUM CHLORIDE 0.9 % (FLUSH) 0.9 %
10 SYRINGE (ML) INJECTION AS NEEDED
Status: DISCONTINUED | OUTPATIENT
Start: 2023-11-29 | End: 2023-12-05 | Stop reason: HOSPADM

## 2023-11-29 RX ORDER — ACETAMINOPHEN 650 MG/1
650 SUPPOSITORY RECTAL EVERY 4 HOURS PRN
Status: DISCONTINUED | OUTPATIENT
Start: 2023-11-29 | End: 2023-12-05 | Stop reason: HOSPADM

## 2023-11-29 RX ORDER — SODIUM CHLORIDE 9 MG/ML
40 INJECTION, SOLUTION INTRAVENOUS AS NEEDED
Status: DISCONTINUED | OUTPATIENT
Start: 2023-11-29 | End: 2023-12-04

## 2023-11-29 RX ADMIN — POTASSIUM CHLORIDE 10 MEQ: 7.46 INJECTION, SOLUTION INTRAVENOUS at 16:16

## 2023-11-29 RX ADMIN — IPRATROPIUM BROMIDE AND ALBUTEROL SULFATE 3 ML: .5; 3 SOLUTION RESPIRATORY (INHALATION) at 19:25

## 2023-11-29 RX ADMIN — FAMOTIDINE 20 MG: 10 INJECTION INTRAVENOUS at 08:59

## 2023-11-29 RX ADMIN — ENOXAPARIN SODIUM 40 MG: 100 INJECTION SUBCUTANEOUS at 01:29

## 2023-11-29 RX ADMIN — ACETAMINOPHEN 650 MG: 325 TABLET ORAL at 22:15

## 2023-11-29 RX ADMIN — POTASSIUM CHLORIDE AND SODIUM CHLORIDE 100 ML/HR: 900; 150 INJECTION, SOLUTION INTRAVENOUS at 01:28

## 2023-11-29 RX ADMIN — IPRATROPIUM BROMIDE AND ALBUTEROL SULFATE 3 ML: .5; 3 SOLUTION RESPIRATORY (INHALATION) at 03:14

## 2023-11-29 RX ADMIN — POTASSIUM CHLORIDE AND SODIUM CHLORIDE 50 ML/HR: 900; 150 INJECTION, SOLUTION INTRAVENOUS at 11:53

## 2023-11-29 RX ADMIN — HYDROCODONE BITARTRATE AND ACETAMINOPHEN 1 TABLET: 7.5; 325 TABLET ORAL at 19:36

## 2023-11-29 RX ADMIN — AMITRIPTYLINE HYDROCHLORIDE 50 MG: 50 TABLET, FILM COATED ORAL at 21:56

## 2023-11-29 RX ADMIN — HYDROCODONE BITARTRATE AND ACETAMINOPHEN 1 TABLET: 7.5; 325 TABLET ORAL at 12:06

## 2023-11-29 RX ADMIN — POTASSIUM CHLORIDE 20 MEQ: 750 CAPSULE, EXTENDED RELEASE ORAL at 17:57

## 2023-11-29 RX ADMIN — ACETAMINOPHEN 650 MG: 325 TABLET ORAL at 02:55

## 2023-11-29 RX ADMIN — IPRATROPIUM BROMIDE AND ALBUTEROL SULFATE 3 ML: .5; 3 SOLUTION RESPIRATORY (INHALATION) at 06:32

## 2023-11-29 RX ADMIN — AMOXICILLIN AND CLAVULANATE POTASSIUM 1 TABLET: 875; 125 TABLET, FILM COATED ORAL at 21:57

## 2023-11-29 RX ADMIN — ONDANSETRON 4 MG: 2 INJECTION INTRAMUSCULAR; INTRAVENOUS at 05:26

## 2023-11-29 RX ADMIN — Medication 10 ML: at 21:56

## 2023-11-29 RX ADMIN — LEVOTHYROXINE SODIUM 75 MCG: 75 TABLET ORAL at 05:22

## 2023-11-29 RX ADMIN — METHOCARBAMOL 500 MG: 500 TABLET ORAL at 09:51

## 2023-11-29 RX ADMIN — HYDROCODONE BITARTRATE AND ACETAMINOPHEN 1 TABLET: 7.5; 325 TABLET ORAL at 05:22

## 2023-11-29 RX ADMIN — AMITRIPTYLINE HYDROCHLORIDE 50 MG: 50 TABLET, FILM COATED ORAL at 01:44

## 2023-11-29 RX ADMIN — POTASSIUM CHLORIDE 10 MEQ: 7.46 INJECTION, SOLUTION INTRAVENOUS at 09:51

## 2023-11-29 RX ADMIN — IPRATROPIUM BROMIDE AND ALBUTEROL SULFATE 3 ML: .5; 3 SOLUTION RESPIRATORY (INHALATION) at 11:08

## 2023-11-29 RX ADMIN — FAMOTIDINE 20 MG: 10 INJECTION INTRAVENOUS at 21:56

## 2023-11-29 RX ADMIN — Medication 10 ML: at 01:47

## 2023-11-29 RX ADMIN — FAMOTIDINE 20 MG: 10 INJECTION INTRAVENOUS at 01:28

## 2023-11-29 RX ADMIN — METHOCARBAMOL 500 MG: 500 TABLET ORAL at 21:57

## 2023-11-29 NOTE — H&P
[facility]   HISTORY AND PHYSICAL    Patient Name: Lona Rodríguez  : 1950  MRN: [id number]  Primary Care Physician:  Christopher Thao MD  Date of admission: 2023    Subjective   Subjective     Chief Complaint:   Nausea, weakness, almost passing out      HPI:    Lona Rodríguez is a 73 y.o. female presented to ER with episodes of nausea vomiting, feeling sick for several days to weeks.  Patient also had some nonspecific abdominal pain.  She complains of headache feeling dizzy and almost passing out.  She was sitting she almost passed out and then she felt like she is dying so she called EMS and came to emergency room.  Workup in the ER revealed severe hypokalemia of 2.2.  Patient admitted to medical service.  When I saw her this morning she is awake alert sitting in bed complains of some dizziness on and off.  Complains of nausea but no vomiting, complains of some abdominal discomfort.        Review of Systems:    Personal History     Past Medical History:   Diagnosis Date    AMD (age related macular degeneration)     Anemia Following surgery    Arthritis     Asthma     Breast cancer Left breast.    Breast mass 2 lumpectomies 1 cancerous 1 benign    CHF (congestive heart failure) Just getting evaluation    Colon polyp Removed during colonos    COPD (chronic obstructive pulmonary disease)     Coronary artery disease Chf    Deep vein thrombosis 1979    Fibrocystic breast Left breast    Fibromyalgia     History of transfusion Aug 2019 2 units    Hyperlipidemia     Hypertension     Obesity     Polymyalgia arteritica     Postoperative wound infection Lymph node resection site,5th toe left foot infection after neuroma surgery. 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    Sciatica        Past Surgical History:   Procedure Laterality Date    BREAST BIOPSY  Left    BREAST SURGERY  Lumpectomies left breast    EYE SURGERY  Lens implants both eyes    REPLACEMENT TOTAL KNEE      TONSILLECTOMY         Family History: family  history includes Arthritis in her brother, father, and mother; COPD in her father; Cancer in her mother; Diabetes in her brother; Heart disease in her mother; Learning disabilities in her brother; Mental illness in her maternal aunt. Otherwise pertinent FHx was reviewed and not pertinent to current issue.    Social History:  reports that she has never smoked. She has never used smokeless tobacco. She reports that she does not drink alcohol and does not use drugs.    Home Medications:  HYDROcodone-acetaminophen, acetaminophen, amitriptyline, atorvastatin, levothyroxine, meclizine, methocarbamol, metoclopramide, montelukast, omeprazole, and vitamin D      Allergies:  Allergies   Allergen Reactions    Morphine Anaphylaxis       Objective   Objective     Vitals:   Temp:  [97.3 °F (36.3 °C)-98.2 °F (36.8 °C)] 97.7 °F (36.5 °C)  Heart Rate:  [] 83  Resp:  [16-20] 16  BP: ()/(54-92) 124/60    Physical Exam    Elderly obese female, not in acute distress.  Pupils reactive external ocular muscle intact no nystagmus.  Oral mucosa dry.  Neck supple.  Heart regular.  Lungs diminished breath sounds.  Abdomen soft obese nontender.  Extremities trace of edema          I have personally reviewed the results from the time of this admission to 11/29/2023 14:55 EST and agree with these findings:  []  Laboratory  []  Microbiology  []  Radiology  []  EKG/Telemetry   []  Cardiology/Vascular   []  Pathology  []  Old records  []  Other:    CBC:    WBC   Date Value Ref Range Status   11/29/2023 12.44 (H) 3.40 - 10.80 10*3/mm3 Final     RBC   Date Value Ref Range Status   11/29/2023 4.18 3.77 - 5.28 10*6/mm3 Final     Hemoglobin   Date Value Ref Range Status   11/29/2023 12.2 12.0 - 15.9 g/dL Final     Hematocrit   Date Value Ref Range Status   11/29/2023 37.9 34.0 - 46.6 % Final     MCV   Date Value Ref Range Status   11/29/2023 90.7 79.0 - 97.0 fL Final     MCH   Date Value Ref Range Status   11/29/2023 29.2 26.6 - 33.0 pg Final      MCHC   Date Value Ref Range Status   11/29/2023 32.2 31.5 - 35.7 g/dL Final     RDW   Date Value Ref Range Status   11/29/2023 16.4 (H) 12.3 - 15.4 % Final     RDW-SD   Date Value Ref Range Status   11/29/2023 54.3 (H) 37.0 - 54.0 fl Final     MPV   Date Value Ref Range Status   11/29/2023 11.0 6.0 - 12.0 fL Final     Platelets   Date Value Ref Range Status   11/29/2023 264 140 - 450 10*3/mm3 Final     Neutrophil %   Date Value Ref Range Status   11/29/2023 77.1 (H) 42.7 - 76.0 % Final     Lymphocyte %   Date Value Ref Range Status   11/29/2023 10.5 (L) 19.6 - 45.3 % Final     Monocyte %   Date Value Ref Range Status   11/29/2023 11.3 5.0 - 12.0 % Final     Eosinophil %   Date Value Ref Range Status   11/29/2023 0.1 (L) 0.3 - 6.2 % Final     Basophil %   Date Value Ref Range Status   11/29/2023 0.2 0.0 - 1.5 % Final     Immature Grans %   Date Value Ref Range Status   11/29/2023 0.8 (H) 0.0 - 0.5 % Final     Neutrophils, Absolute   Date Value Ref Range Status   11/29/2023 9.59 (H) 1.70 - 7.00 10*3/mm3 Final     Lymphocytes, Absolute   Date Value Ref Range Status   11/29/2023 1.31 0.70 - 3.10 10*3/mm3 Final     Monocytes, Absolute   Date Value Ref Range Status   11/29/2023 1.40 (H) 0.10 - 0.90 10*3/mm3 Final     Eosinophils, Absolute   Date Value Ref Range Status   11/29/2023 0.01 0.00 - 0.40 10*3/mm3 Final     Basophils, Absolute   Date Value Ref Range Status   11/29/2023 0.03 0.00 - 0.20 10*3/mm3 Final     Immature Grans, Absolute   Date Value Ref Range Status   11/29/2023 0.10 (H) 0.00 - 0.05 10*3/mm3 Final     nRBC   Date Value Ref Range Status   11/29/2023 0.0 0.0 - 0.2 /100 WBC Final        BMP:    Lab Results   Component Value Date    GLUCOSE 95 11/29/2023    BUN 9 11/29/2023    CREATININE 0.67 11/29/2023    BCR 13.4 11/29/2023    K 2.8 (L) 11/29/2023    CO2 30.4 (H) 11/29/2023    CALCIUM 7.6 (L) 11/29/2023    ALBUMIN 2.4 (L) 11/29/2023    LABIL2 1.4 09/24/2019    AST 17 11/29/2023    ALT 8 11/29/2023         CT Chest With Contrast Diagnostic    Result Date: 11/28/2023   No pulmonary embolism.  No pneumonia.     Please note that portions of this note were completed with a voice recognition program.  KRYSTEN CLEMENTE JR, MD       Electronically Signed and Approved By: KRYSTEN CLEMENTE JR, MD on 11/28/2023 at 23:30              CT Head Without Contrast    Result Date: 11/28/2023    No acute brain abnormality is seen.  New acute sinusitis is suspected, especially involving the left sphenoid paranasal sinus.    Please note that portions of this note were completed with a voice recognition program.  KRYSTEN CELMENTE JR, MD       Electronically Signed and Approved By: KRYSTEN CLEMENTE JR, MD on 11/28/2023 at 23:22              XR Chest 1 View    Result Date: 11/28/2023   Atelectasis or pneumonia in the left lung base       MICHELLE MAGALLON MD       Electronically Signed and Approved By: MICHELLE MAGALLON MD on 11/28/2023 at 20:22                     Assessment & Plan   Assessment / Plan       Current Diagnosis:  Active Hospital Problems    Diagnosis     **Hypokalemia     Dizziness     Nausea and vomiting      Plan:   Patient presented with severe hypokalemia as well as symptoms of dizziness and nausea vomiting.  Patient has been on multiple diuretics but currently not taking any according to her for several days as her edema improved.  At this point will replace potassium IV and oral  Monitor magnesium levels and along with potassium  Restart essential meds  Patient concerned that she needs her Robaxin for her back pain  Advised to use only as needed  Restart essential meds  Monitor blood pressure with orthostatic changes  Further management be based on clinical course, lab results      DVT prophylaxis:  Medical DVT prophylaxis orders are present.    GI Prophylaxis:       Pepcid    CODE STATUS:    Code Status (Patient has no pulse and is not breathing): CPR (Attempt to Resuscitate)  Medical Interventions (Patient has pulse or is  breathing): Full Support    Admission Status:  I believe this patient meets inpatient status.             I have dictated this note utilizing Dragon Dictation.             Please note that portions of this note were completed with a voice recognition program.             Part of this note may be an electronic transcription/translation of spoken language to printed text         using the Dragon Dictation System.       Electronically signed by Luiz Diana MD, 11/29/23, 8:51 AM EST.

## 2023-11-29 NOTE — ED PROVIDER NOTES
Time: 11:41 PM EST  Date of encounter:  11/28/2023  Independent Historian/Clinical History and Information was obtained by:   Patient    History is limited by: N/A    Chief Complaint: Weakness      History of Present Illness:  Patient is a 73 y.o. year old female who presents to the emergency department for evaluation of weakness with associated nausea/vomiting that began 3 weeks ago.  Patient denies abdominal pain.  Patient states she has had a headache for the past several weeks but she attributes that to her vomiting.  Patient denies chest pain or shortness of air.    HPI    Patient Care Team  Primary Care Provider: Christopher Thao MD    Past Medical History:     Allergies   Allergen Reactions    Morphine Anaphylaxis     Past Medical History:   Diagnosis Date    AMD (age related macular degeneration)     Anemia Following surgery    Arthritis     Asthma     Breast cancer Left breast.    Breast mass 2 lumpectomies 1 cancerous 1 benign    CHF (congestive heart failure) Just getting evaluation    Colon polyp Removed during colonos    COPD (chronic obstructive pulmonary disease)     Coronary artery disease Chf    Deep vein thrombosis 1979    Fibrocystic breast Left breast    Fibromyalgia     History of transfusion Aug 2019 2 units    Hyperlipidemia     Hypertension     Obesity     Polymyalgia arteritica     Postoperative wound infection Lymph node resection site,5th toe left foot infection after neuroma surgery. 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    Sciatica      Past Surgical History:   Procedure Laterality Date    BREAST BIOPSY  Left    BREAST SURGERY  Lumpectomies left breast    EYE SURGERY  Lens implants both eyes    REPLACEMENT TOTAL KNEE      TONSILLECTOMY       Family History   Problem Relation Age of Onset    Arthritis Mother     Cancer Mother     Heart disease Mother     Arthritis Father     COPD Father     Arthritis Brother     Diabetes Brother     Learning disabilities Brother         Dyslexic    Mental illness  Maternal Aunt         Schizophrenic       Home Medications:  Prior to Admission medications    Medication Sig Start Date End Date Taking? Authorizing Provider   amitriptyline (ELAVIL) 50 MG tablet Take 1 tablet by mouth Every Night.    Ciara Mcgarry MD   atorvastatin (LIPITOR) 20 MG tablet Take 1 tablet by mouth Daily.    Ciara Mcgarry MD   Budeson-Glycopyrrol-Formoterol (Breztri Aerosphere) 160-9-4.8 MCG/ACT aerosol inhaler Inhale 2 puffs 2 (Two) Times a Day.    Ciara Mcgarry MD   furosemide (LASIX) 40 MG tablet Take 1 tablet by mouth Every 12 (Twelve) Hours. 4/20/23   Ciara Mcgarry MD   furosemide (LASIX) 80 MG tablet Daily.    Ciara Mcgarry MD   gabapentin (NEURONTIN) 300 MG capsule Take 1 capsule by mouth 3 (Three) Times a Day for 30 days. 12/26/22 1/25/23  Jazlyn Luna MD   gabapentin (NEURONTIN) 300 MG capsule 1 capsule Daily.    Ciara Mcgarry MD   HYDROcodone-acetaminophen (NORCO) 7.5-325 MG per tablet Take 1 tablet by mouth Every 6 (Six) Hours.  Patient not taking: Reported on 9/14/2023 4/20/23   Ciara Mcgarry MD   ipratropium-albuterol (DUO-NEB) 0.5-2.5 mg/3 ml nebulizer Take 3 mL by nebulization Every 4 (Four) Hours for 30 days. 12/26/22 1/25/23  Jazlyn Luna MD   irbesartan (AVAPRO) 300 MG tablet Take 1 tablet by mouth Every Night.    Ciara Mcgarry MD   KLOR-CON 10 MEQ CR tablet Every 12 (Twelve) Hours. 4/20/23   Ciara Mcgarry MD   levothyroxine (SYNTHROID, LEVOTHROID) 75 MCG tablet Take 1 tablet by mouth Daily.    Ciara Mcgarry MD   loperamide (IMODIUM) 2 MG capsule TAKE ONE CAPSULE BY MOUTH EVERY 4 HOURS for 7 days 11/3/23   Ciara Mcgarry MD   methocarbamol (ROBAXIN) 500 MG tablet Take 1 tablet by mouth Every 12 (Twelve) Hours. 4/20/23   Ciara Mcgarry MD   methocarbamol (ROBAXIN) 750 MG tablet  9/6/23   Ciara Mcgarry MD   metoclopramide (REGLAN) 5 MG tablet TAKE ONE TABLET BY MOUTH FOUR TIMES  DAILY (before meals and bedtime) X7 days 11/21/23   Ciara Mcgarry MD   metOLazone (ZAROXOLYN) 5 MG tablet Take 1 tablet by mouth Daily. 9/5/23   Ciara Mcgarry MD   metroNIDAZOLE (FLAGYL) 500 MG tablet Take 1 tablet by mouth 3 times a day. 11/6/23   Ciara Mcgarry MD   montelukast (SINGULAIR) 10 MG tablet Take 1 tablet by mouth Every Night.    Ciara Mcgarry MD   omeprazole (priLOSEC) 40 MG capsule Take 1 capsule by mouth Daily.    Ciara Mcgarry MD   ondansetron (ZOFRAN) 4 MG tablet Every 8 (Eight) Hours. 3/8/23   Ciara Mcgarry MD   potassium chloride 10 MEQ CR tablet Take 1 tablet by mouth Every 12 (Twelve) Hours. 4/20/23   Ciara Mcgarry MD   predniSONE (DELTASONE) 20 MG tablet TAKE ONE TABLET BY MOUTH EVERY DAY for 7 days 8/9/23   Ciara Mcgarry MD   promethazine (PHENERGAN) 12.5 MG tablet TAKE ONE TABLET BY MOUTH EVERY 6 HOURS FOR 7 DAYS 11/3/23   Ciara Mcgarry MD   promethazine (PHENERGAN) 25 MG tablet Take 1 tablet by mouth Every 6 (Six) Hours. 11/6/23   Ciara Mcgarry MD   triamcinolone (KENALOG) 0.1 % ointment applied topically 3 times a day for 7 day(s)] 8/9/23   Ciara Mcgarry MD   vitamin D (ERGOCALCIFEROL) 1.25 MG (85567 UT) capsule capsule Take 1 capsule by mouth 2 (Two) Times a Week.    Ciara Mcgarry MD        Social History:   Social History     Tobacco Use    Smoking status: Never    Smokeless tobacco: Never    Tobacco comments:     Grew up with [id number]   Vaping Use    Vaping Use: Never used   Substance Use Topics    Alcohol use: Never    Drug use: Never         Review of Systems:  Review of Systems   Constitutional:  Negative for chills and fever.   HENT:  Negative for congestion, rhinorrhea and sore throat.    Eyes:  Negative for pain and visual disturbance.   Respiratory:  Negative for apnea, cough, chest tightness and shortness of breath.    Cardiovascular:  Negative for chest pain and palpitations.  "  Gastrointestinal:  Positive for nausea and vomiting. Negative for abdominal pain and diarrhea.   Genitourinary:  Negative for difficulty urinating and dysuria.   Musculoskeletal:  Negative for joint swelling and myalgias.   Skin:  Negative for color change.   Neurological:  Positive for weakness. Negative for seizures and headaches.   Psychiatric/Behavioral: Negative.     All other systems reviewed and are negative.       Physical Exam:  /66 (BP Location: Right arm, Patient Position: Sitting)   Pulse 92   Temp 97.9 °F (36.6 °C) (Oral)   Resp 18   Ht 165.1 cm (65\")   Wt 97.1 kg (214 lb)   SpO2 98%   BMI 35.61 kg/m²     Physical Exam  Vitals and nursing note reviewed.   Constitutional:       General: She is not in acute distress.     Appearance: She is ill-appearing. She is not toxic-appearing.   HENT:      Head: Normocephalic and atraumatic.      Jaw: There is normal jaw occlusion.   Eyes:      General: Lids are normal.      Extraocular Movements: Extraocular movements intact.      Conjunctiva/sclera: Conjunctivae normal.      Pupils: Pupils are equal, round, and reactive to light.   Cardiovascular:      Rate and Rhythm: Normal rate and regular rhythm.      Pulses: Normal pulses.      Heart sounds: Normal heart sounds.   Pulmonary:      Effort: Pulmonary effort is normal. No respiratory distress.      Breath sounds: Normal breath sounds. No wheezing or rhonchi.   Abdominal:      General: Abdomen is flat.      Palpations: Abdomen is soft.      Tenderness: There is no abdominal tenderness. There is no guarding or rebound.   Musculoskeletal:         General: Normal range of motion.      Cervical back: Normal range of motion and neck supple.      Right lower leg: No edema.      Left lower leg: No edema.   Skin:     General: Skin is warm and dry.   Neurological:      Mental Status: She is alert and oriented to person, place, and time. Mental status is at baseline.   Psychiatric:         Mood and Affect: " Mood normal.                Procedures:  Procedures      Medical Decision Making:      Comorbidities that affect care:    Coronary artery disease, anemia    External Notes reviewed:          The following orders were placed and all results were independently analyzed by me:  Orders Placed This Encounter   Procedures    XR Chest 1 View    CT Chest With Contrast Diagnostic    CT Head Without Contrast    [place] Draw    Comprehensive Metabolic Panel    Single High Sensitivity Troponin T    Magnesium    Urinalysis With Microscopic If Indicated (No Culture) - Urine, Clean Catch    CBC Auto Differential    NPO Diet NPO Type: Strict NPO    Undress & Gown    Continuous Pulse Oximetry    Vital Signs    Orthostatic Blood Pressure    Inpatient Hospitalist Consult    Oxygen Therapy- Nasal Cannula; Titrate 1-6 LPM Per SpO2; 90 - 95%    POC Glucose Once    ECG 12 Lead ED Triage Standing Order; Weak / Dizzy / AMS    Insert Peripheral IV    Inpatient Admission    Fall Precautions    CBC & Differential    Green Top (Gel)    Lavender Top    Gold Top - SST    Light Blue Top       Medications Given in the Emergency Department:  Medications   sodium chloride 0.9 % flush 10 mL (has no administration in time range)   potassium chloride 10 mEq in 100 mL IVPB (10 mEq Intravenous New Bag 11/28/23 2327)   potassium chloride 10 mEq in 100 mL IVPB (0 mEq Intravenous Stopped 11/28/23 2330)   sodium chloride 0.9 % bolus 1,000 mL (1,000 mL Intravenous New Bag 11/28/23 2152)   ondansetron (ZOFRAN) injection 4 mg (4 mg Intravenous Given 11/28/23 2151)   potassium chloride (MICRO-K/KLOR-CON) CR capsule (40 mEq Oral Given 11/28/23 2151)   iopamidol (ISOVUE-370) 76 % injection 100 mL (50 mL Intravenous Given 11/28/23 2147)   metoclopramide (REGLAN) injection 10 mg (10 mg Intravenous Given 11/28/23 2312)        ED Course:         Labs:    Lab Results (last 24 hours)       Procedure Component Value Units Date/Time    CBC & Differential [[id number]]   (Abnormal) Collected: 11/28/23 1943    Specimen: Blood from Arm, Right Updated: 11/28/23 1956    Narrative:      The following orders were created for panel order CBC & Differential.  Procedure                               Abnormality         Status                     ---------                               -----------         ------                     CBC Auto Differential[[id number]]        Abnormal            Final result               Scan Slide[[id number]]                                                                    Please view results for these tests on the individual orders.    Comprehensive Metabolic Panel [[id number]]  (Abnormal) Collected: 11/28/23 1943    Specimen: Blood from Arm, Right Updated: 11/28/23 2026     Glucose 82 mg/dL      BUN 10 mg/dL      Creatinine 0.64 mg/dL      Sodium 127 mmol/L      Potassium 2.3 mmol/L      Chloride 76 mmol/L      CO2 34.1 mmol/L      Calcium 8.7 mg/dL      Total Protein 6.1 g/dL      Albumin 3.0 g/dL      ALT (SGPT) 9 U/L      AST (SGOT) 23 U/L      Alkaline Phosphatase 161 U/L      Total Bilirubin 0.7 mg/dL      Globulin 3.1 gm/dL      A/G Ratio 1.0 g/dL      BUN/Creatinine Ratio 15.6     Anion Gap 16.9 mmol/L      eGFR 93.4 mL/min/1.73     Narrative:      GFR Normal >60  Chronic Kidney Disease <60  Kidney Failure <15    The GFR formula is only valid for adults with stable renal function between ages 18 and 70.    Single High Sensitivity Troponin T [[id number]]  (Abnormal) Collected: 11/28/23 1943    Specimen: Blood from Arm, Right Updated: 11/28/23 2016     HS Troponin T 18 ng/L     Narrative:      High Sensitive Troponin T Reference Range:  <14.0 ng/L- Negative Female for AMI  <22.0 ng/L- Negative Male for AMI  >=14 - Abnormal Female indicating possible myocardial injury.  >=22 - Abnormal Male indicating possible myocardial injury.   Clinicians would have to utilize clinical acumen, EKG, Troponin, and serial changes to determine if it is an Acute Myocardial  Infarction or myocardial injury due to an underlying chronic condition.         Magnesium [[id number]]  (Normal) Collected: 11/28/23 1943    Specimen: Blood from Arm, Right Updated: 11/28/23 2025     Magnesium 1.9 mg/dL     CBC Auto Differential [[id number]]  (Abnormal) Collected: 11/28/23 1943    Specimen: Blood from Arm, Right Updated: 11/28/23 1956     WBC 13.23 10*3/mm3      RBC 4.63 10*6/mm3      Hemoglobin 13.3 g/dL      Hematocrit 38.9 %      MCV 84.0 fL      MCH 28.7 pg      MCHC 34.2 g/dL      RDW 15.1 %      RDW-SD 46.5 fl      MPV 8.8 fL      Platelets 433 10*3/mm3      Neutrophil % 79.2 %      Lymphocyte % 8.5 %      Monocyte % 10.7 %      Eosinophil % 0.6 %      Basophil % 0.2 %      Immature Grans % 0.8 %      Neutrophils, Absolute 10.48 10*3/mm3      Lymphocytes, Absolute 1.12 10*3/mm3      Monocytes, Absolute 1.41 10*3/mm3      Eosinophils, Absolute 0.08 10*3/mm3      Basophils, Absolute 0.03 10*3/mm3      Immature Grans, Absolute 0.11 10*3/mm3      nRBC 0.0 /100 WBC              Imaging:    CT Chest With Contrast Diagnostic    Result Date: 11/28/2023  PROCEDURE: CT CHEST W CONTRAST DIAGNOSTIC  COMPARISONS: 11/28/2023; 9/1/2023; 12/22/2022.  INDICATIONS: pneumonia vs. atelectasis on recent CXR; h/o breast ca  TECHNIQUE: After obtaining the patient's consent, 695 CT/CTA images were obtained with non-ionic intravenous contrast material.   PROTOCOL:   Standard pulmonary CTA imaging protocol performed.    RADIATION:   Total DLP: 110 mGy*cm; total mAs: 963.   Automated exposure control was utilized to minimize radiation dose. CONTRAST: 50 mL Isovue 370 I.V.  FINDINGS: No pulmonary embolism.  No pneumonia.  Mild atelectasis and/or fibrosis is (are) seen in the left lung base and probably account(s) for the findings on the chest x-ray exam from earlier during the same day.  Mild cardiomegaly is suspected.  There is a moderate-to-large hiatal hernia, as before.  Calcified pleural plaques are seen, especially  on the left, seen previously.  There is a right-sided central venous line in place, as before.  Its tip is in the upper superior vena cava (SVC), seen previously.  No definite suspicious pulmonary nodules are seen.  Minimal if any coronary calcification is appreciated; this exam was not tailored in order to evaluate the coronary arteries.  There are old healed bilateral rib fractures.  There is dextroscoliosis of the thoracic spine is previously.  There is diffuse hepatic steatosis with possible hepatomegaly.  No splenomegaly.  No definite acute findings are seen in the upper abdomen.  Chronic calcified granulomatous disease involves the chest and the abdomen.  Otherwise, no significant interval change is appreciated since the 12/22/2022 study with findings as described in the prior exam report.       No pulmonary embolism.  No pneumonia.     Please note that portions of this note were completed with a voice recognition program.  KRYSTEN CLEMENTE JR, MD       Electronically Signed and Approved By: KRYSTEN CLEMENTE JR, MD on 11/28/2023 at 23:30              CT Head Without Contrast    Result Date: 11/28/2023  PROCEDURE: CT HEAD WO CONTRAST  COMPARISON: 12/22/2022.  INDICATIONS: Headache, intractable.  PROTOCOL:   Standard imaging protocol performed    RADIATION:   Total DLP: 805 mGy*cm   MA and/or KV were/was adjusted to minimize radiation dose.    TECHNIQUE: After obtaining the patient's consent, 104 CT images were obtained without non-ionic intravenous contrast material.  DISCUSSION:  A routine nonenhanced head CT was performed. No acute brain abnormality is identified. No acute intracranial hemorrhage. No acute infarction. No acute skull fracture. No midline shift or acute intracranial mass effect is seen.  Minimal chronic small vessel ischemia/infarction is suspected. There are arterial calcifications. The extra-axial spaces and the ventricular system are prominent, suggesting central atrophy.  Acute sinusitis is  suggested, especially involving the left sphenoid paranasal sinus.  The imaged middle ear clefts (that is, the bilateral mastoid air cell complexes, bilateral middle ear cavities, and bilateral external auditory canals) are well aerated.  The patient has undergone bilateral cataract extractions with intra-ocular lens implants.  There may be mild cerebellar tonsillar ectopia.        No acute brain abnormality is seen.  New acute sinusitis is suspected, especially involving the left sphenoid paranasal sinus.    Please note that portions of this note were completed with a voice recognition program.  KRYSTEN CLMEENTE JR, MD       Electronically Signed and Approved By: KRYSTEN CLEMENTE JR, MD on 11/28/2023 at 23:22              XR Chest 1 View    Result Date: 11/28/2023  PROCEDURE: XR CHEST 1 VW  COMPARISON: [facility], CT, CT CHEST W CONTRAST DIAGNOSTIC, 12/22/2022, 19:30.  INDICATIONS: GENERALIZED WEAKNESS; DIZZINESS  FINDINGS:  There is a right IJ port.  There is cardiomegaly.  Pulmonary vasculature appears within normal limits.  There is strandy airspace disease in the left lung base.  Right lung is grossly clear.  Thoracic scoliosis and left glenohumeral osteoarthritis noted       Atelectasis or pneumonia in the left lung base       MICHELLE MAGALLON MD       Electronically Signed and Approved By: MICHELLE MAGALLON MD on 11/28/2023 at 20:22                Differential Diagnosis and Discussion:    Vomiting: Differential diagnosis includes but is not limited to migraine, labyrinthine disorders, psychogenic, metabolic and endocrine causes, peptic ulcer, gastric outlet obstruction, gastritis, gastroenteritis, appendicitis, intestinal obstruction, paralytic ileus, food poisoning, cholecystitis, acute hepatitis, acute pancreatitis, acute febrile illness, and myocardial infarction.  Weakness: Based on the patient's history, signs, and symptoms, the diffential diagnosis includes but is not limited to meningitis, stroke,  sepsis, subarachnoid hemorrhage, intracranial bleeding, encephalitis, acute uti, dehydration, MS, myasthenia gravis, Guillan [place], migraine variant, neuromuscular disorders vertigo, electrolyte imbalance, and metabolic disorders.    All labs were reviewed and interpreted by me.  All X-rays impressions were independently interpreted by me.  EKG was interpreted by supervising attending.  CT scan radiology impression was interpreted by me.    MDM     Amount and/or Complexity of Data Reviewed  Clinical lab tests: reviewed  Tests in the radiology section of CPT®: reviewed  Tests in the medicine section of CPT®: reviewed  Decide to obtain previous medical records or to obtain history from someone other than the patient: yes       CT head shows acute sinusitis.  CT chest with contrast shows no evidence of pneumonia or PE.  Patient's potassium was initially 2.3.  We are doing 3 IV runs of potassium while in the ED and 1 oral run of potassium.  Patient denies chest pain or shortness of air at this time but states she feels weak.  Spoke with Dr. KO Diana who agrees to admit the patient.          Patient Care Considerations:          Consultants/Shared Management Plan:    Hospitalist: I have discussed the case with Dr. Melendez who agrees to accept the patient for admission.    Social Determinants of Health:          Disposition and Care Coordination:    Admit:   Through independent evaluation of the patient's history, physical, and imperical data, the patient meets criteria for observation/admission to the hospital.        Final diagnoses:   Hypokalemia   Weakness        ED Disposition       ED Disposition   Decision to Admit    Condition   --    Comment   Level of Care: Med/Surg [1]   Diagnosis: Hypokalemia [253688]   Admitting Physician: LUDY DIANA [[id number]]   Certification: I Certify That Inpatient Hospital Services Are Medically Necessary For Greater Than 2 Midnights                 This medical record created using voice  recognition software.             Lalo Helm PA-C  11/28/23 6203

## 2023-11-29 NOTE — PLAN OF CARE
Problem: Adult Inpatient Plan of Care  Goal: Plan of Care Review  Outcome: Ongoing, Progressing  Goal: Patient-Specific Goal (Individualized)  Outcome: Ongoing, Progressing  Goal: Absence of Hospital-Acquired Illness or Injury  Outcome: Ongoing, Progressing  Intervention: Identify and Manage Fall Risk  Recent Flowsheet Documentation  Taken 11/29/2023 1600 by Stephanie Cardoza RN  Safety Promotion/Fall Prevention: safety round/check completed  Taken 11/29/2023 1411 by Stephanie Cardoza RN  Safety Promotion/Fall Prevention: safety round/check completed  Taken 11/29/2023 1210 by Stephanie Cardoza RN  Safety Promotion/Fall Prevention: safety round/check completed  Taken 11/29/2023 1010 by Stephanie Cardoza RN  Safety Promotion/Fall Prevention: safety round/check completed  Taken 11/29/2023 0810 by Stephanie Cardoza RN  Safety Promotion/Fall Prevention: safety round/check completed  Taken 11/29/2023 0710 by Stephanie Cardoza RN  Safety Promotion/Fall Prevention: safety round/check completed  Intervention: Prevent Skin Injury  Recent Flowsheet Documentation  Taken 11/29/2023 0710 by Stephanie Cardoza RN  Body Position: position changed independently  Skin Protection:   adhesive use limited   tubing/devices free from skin contact  Intervention: Prevent and Manage VTE (Venous Thromboembolism) Risk  Recent Flowsheet Documentation  Taken 11/29/2023 0710 by Stephanie Cardoza RN  Activity Management: activity encouraged  VTE Prevention/Management: (see emar) other (see comments)  Range of Motion: ROM (range of motion) performed  Intervention: Prevent Infection  Recent Flowsheet Documentation  Taken 11/29/2023 0710 by Stephanie Cardoza RN  Infection Prevention:   environmental surveillance performed   hand hygiene promoted   rest/sleep promoted   single patient room provided   visitors restricted/screened  Goal: Optimal Comfort and Wellbeing  Outcome: Ongoing, Progressing  Intervention: Monitor Pain and Promote Comfort  Recent  Flowsheet Documentation  Taken 11/29/2023 1236 by Stephanie Cardoza RN  Pain Management Interventions:   see MAR   quiet environment facilitated   care clustered   cold applied  Taken 11/29/2023 0710 by Stephanie Cardoza RN  Pain Management Interventions:   see MAR   quiet environment facilitated   care clustered  Intervention: Provide Person-Centered Care  Recent Flowsheet Documentation  Taken 11/29/2023 0710 by Stephanie Cardoza RN  Trust Relationship/Rapport:   care explained   emotional support provided   questions answered   thoughts/feelings acknowledged  Goal: Readiness for Transition of Care  Outcome: Ongoing, Progressing     Problem: Hypertension Comorbidity  Goal: Blood Pressure in Desired Range  Outcome: Ongoing, Progressing  Intervention: Maintain Blood Pressure Management  Recent Flowsheet Documentation  Taken 11/29/2023 1600 by Stephanie Cardoza RN  Medication Review/Management: medications reviewed  Taken 11/29/2023 1411 by Stephanie Cardoza RN  Medication Review/Management: medications reviewed  Taken 11/29/2023 1210 by Stephanie Cardoza RN  Medication Review/Management: medications reviewed  Taken 11/29/2023 1010 by Stephanie Cardoza RN  Medication Review/Management: medications reviewed  Taken 11/29/2023 0810 by Stephanie Cardoza RN  Medication Review/Management: medications reviewed  Taken 11/29/2023 0710 by Stephanie Cardoza RN  Medication Review/Management: medications reviewed   Goal Outcome Evaluation:   Pt has had no new changes throughout shift and continues to remain stable. Pt has had complaints of nausea throughout shift. Nausea has been controlled with prn medication x1. Pt is x1 assist with walker and gait belt to [facility]. Pt received x2 rounds of potassium. Pt has been medicated for pain x1 during shift.

## 2023-11-30 LAB
ALBUMIN SERPL-MCNC: 2.1 G/DL (ref 3.5–5.2)
ALBUMIN/GLOB SERPL: 0.9 G/DL
ALP SERPL-CCNC: 113 U/L (ref 39–117)
ALT SERPL W P-5'-P-CCNC: 7 U/L (ref 1–33)
ANION GAP SERPL CALCULATED.3IONS-SCNC: 9.5 MMOL/L (ref 5–15)
AST SERPL-CCNC: 17 U/L (ref 1–32)
BASOPHILS # BLD AUTO: 0.06 10*3/MM3 (ref 0–0.2)
BASOPHILS NFR BLD AUTO: 0.8 % (ref 0–1.5)
BILIRUB SERPL-MCNC: 0.5 MG/DL (ref 0–1.2)
BUN SERPL-MCNC: 8 MG/DL (ref 8–23)
BUN/CREAT SERPL: 13.6 (ref 7–25)
CALCIUM SPEC-SCNC: 7.9 MG/DL (ref 8.6–10.5)
CHLORIDE SERPL-SCNC: 86 MMOL/L (ref 98–107)
CO2 SERPL-SCNC: 27.5 MMOL/L (ref 22–29)
CREAT SERPL-MCNC: 0.59 MG/DL (ref 0.57–1)
DEPRECATED RDW RBC AUTO: 50.8 FL (ref 37–54)
EGFRCR SERPLBLD CKD-EPI 2021: 95.3 ML/MIN/1.73
EOSINOPHIL # BLD AUTO: 0.12 10*3/MM3 (ref 0–0.4)
EOSINOPHIL NFR BLD AUTO: 1.7 % (ref 0.3–6.2)
ERYTHROCYTE [DISTWIDTH] IN BLOOD BY AUTOMATED COUNT: 16.3 % (ref 12.3–15.4)
GLOBULIN UR ELPH-MCNC: 2.4 GM/DL
GLUCOSE SERPL-MCNC: 86 MG/DL (ref 65–99)
HCT VFR BLD AUTO: 31 % (ref 34–46.6)
HGB BLD-MCNC: 10.3 G/DL (ref 12–15.9)
IMM GRANULOCYTES # BLD AUTO: 0.1 10*3/MM3 (ref 0–0.05)
IMM GRANULOCYTES NFR BLD AUTO: 1.4 % (ref 0–0.5)
LYMPHOCYTES # BLD AUTO: 1.34 10*3/MM3 (ref 0.7–3.1)
LYMPHOCYTES NFR BLD AUTO: 18.5 % (ref 19.6–45.3)
MAGNESIUM SERPL-MCNC: 1.6 MG/DL (ref 1.6–2.4)
MCH RBC QN AUTO: 28.7 PG (ref 26.6–33)
MCHC RBC AUTO-ENTMCNC: 33.2 G/DL (ref 31.5–35.7)
MCV RBC AUTO: 86.4 FL (ref 79–97)
MONOCYTES # BLD AUTO: 0.99 10*3/MM3 (ref 0.1–0.9)
MONOCYTES NFR BLD AUTO: 13.7 % (ref 5–12)
NEUTROPHILS NFR BLD AUTO: 4.63 10*3/MM3 (ref 1.7–7)
NEUTROPHILS NFR BLD AUTO: 63.9 % (ref 42.7–76)
NRBC BLD AUTO-RTO: 0 /100 WBC (ref 0–0.2)
PLATELET # BLD AUTO: 299 10*3/MM3 (ref 140–450)
PMV BLD AUTO: 9.1 FL (ref 6–12)
POTASSIUM SERPL-SCNC: 3.3 MMOL/L (ref 3.5–5.2)
PROT SERPL-MCNC: 4.5 G/DL (ref 6–8.5)
RBC # BLD AUTO: 3.59 10*6/MM3 (ref 3.77–5.28)
SODIUM SERPL-SCNC: 123 MMOL/L (ref 136–145)
WBC NRBC COR # BLD AUTO: 7.24 10*3/MM3 (ref 3.4–10.8)

## 2023-11-30 PROCEDURE — 85025 COMPLETE CBC W/AUTO DIFF WBC: CPT | Performed by: INTERNAL MEDICINE

## 2023-11-30 PROCEDURE — 25010000002 SODIUM CHLORIDE 0.9 % WITH KCL 20 MEQ 20-0.9 MEQ/L-% SOLUTION: Performed by: INTERNAL MEDICINE

## 2023-11-30 PROCEDURE — 94799 UNLISTED PULMONARY SVC/PX: CPT

## 2023-11-30 PROCEDURE — 63710000001 DIPHENHYDRAMINE PER 50 MG: Performed by: INTERNAL MEDICINE

## 2023-11-30 PROCEDURE — 94664 DEMO&/EVAL PT USE INHALER: CPT

## 2023-11-30 PROCEDURE — 25010000002 ENOXAPARIN PER 10 MG: Performed by: INTERNAL MEDICINE

## 2023-11-30 PROCEDURE — 80053 COMPREHEN METABOLIC PANEL: CPT | Performed by: INTERNAL MEDICINE

## 2023-11-30 PROCEDURE — 83735 ASSAY OF MAGNESIUM: CPT | Performed by: INTERNAL MEDICINE

## 2023-11-30 PROCEDURE — 25010000002 ONDANSETRON PER 1 MG: Performed by: INTERNAL MEDICINE

## 2023-11-30 RX ORDER — DIPHENHYDRAMINE HCL 25 MG
25 CAPSULE ORAL NIGHTLY PRN
Status: DISCONTINUED | OUTPATIENT
Start: 2023-11-30 | End: 2023-12-05 | Stop reason: HOSPADM

## 2023-11-30 RX ADMIN — ACETAMINOPHEN 650 MG: 325 TABLET ORAL at 12:16

## 2023-11-30 RX ADMIN — DIPHENHYDRAMINE HYDROCHLORIDE 25 MG: 25 CAPSULE ORAL at 03:30

## 2023-11-30 RX ADMIN — LEVOTHYROXINE SODIUM 75 MCG: 75 TABLET ORAL at 05:23

## 2023-11-30 RX ADMIN — METHOCARBAMOL 500 MG: 500 TABLET ORAL at 08:53

## 2023-11-30 RX ADMIN — ONDANSETRON 4 MG: 2 INJECTION INTRAMUSCULAR; INTRAVENOUS at 12:16

## 2023-11-30 RX ADMIN — METHOCARBAMOL 500 MG: 500 TABLET ORAL at 21:35

## 2023-11-30 RX ADMIN — ENOXAPARIN SODIUM 40 MG: 100 INJECTION SUBCUTANEOUS at 08:53

## 2023-11-30 RX ADMIN — IPRATROPIUM BROMIDE AND ALBUTEROL SULFATE 3 ML: .5; 3 SOLUTION RESPIRATORY (INHALATION) at 00:52

## 2023-11-30 RX ADMIN — HYDROCODONE BITARTRATE AND ACETAMINOPHEN 1 TABLET: 7.5; 325 TABLET ORAL at 01:37

## 2023-11-30 RX ADMIN — FAMOTIDINE 20 MG: 10 INJECTION INTRAVENOUS at 21:35

## 2023-11-30 RX ADMIN — DOCUSATE SODIUM 50MG AND SENNOSIDES 8.6MG 2 TABLET: 8.6; 5 TABLET, FILM COATED ORAL at 21:34

## 2023-11-30 RX ADMIN — HYDROCODONE BITARTRATE AND ACETAMINOPHEN 1 TABLET: 7.5; 325 TABLET ORAL at 21:34

## 2023-11-30 RX ADMIN — ONDANSETRON 4 MG: 2 INJECTION INTRAMUSCULAR; INTRAVENOUS at 18:20

## 2023-11-30 RX ADMIN — POTASSIUM CHLORIDE 20 MEQ: 750 CAPSULE, EXTENDED RELEASE ORAL at 08:53

## 2023-11-30 RX ADMIN — IPRATROPIUM BROMIDE AND ALBUTEROL SULFATE 3 ML: .5; 3 SOLUTION RESPIRATORY (INHALATION) at 12:22

## 2023-11-30 RX ADMIN — HYDROCODONE BITARTRATE AND ACETAMINOPHEN 1 TABLET: 7.5; 325 TABLET ORAL at 14:14

## 2023-11-30 RX ADMIN — POTASSIUM CHLORIDE AND SODIUM CHLORIDE 50 ML/HR: 900; 150 INJECTION, SOLUTION INTRAVENOUS at 09:10

## 2023-11-30 RX ADMIN — AMOXICILLIN AND CLAVULANATE POTASSIUM 1 TABLET: 875; 125 TABLET, FILM COATED ORAL at 08:53

## 2023-11-30 RX ADMIN — IPRATROPIUM BROMIDE AND ALBUTEROL SULFATE 3 ML: .5; 3 SOLUTION RESPIRATORY (INHALATION) at 07:06

## 2023-11-30 RX ADMIN — DIPHENHYDRAMINE HYDROCHLORIDE 25 MG: 25 CAPSULE ORAL at 21:52

## 2023-11-30 RX ADMIN — Medication 10 ML: at 08:53

## 2023-11-30 RX ADMIN — IPRATROPIUM BROMIDE AND ALBUTEROL SULFATE 3 ML: .5; 3 SOLUTION RESPIRATORY (INHALATION) at 19:56

## 2023-11-30 RX ADMIN — FAMOTIDINE 20 MG: 10 INJECTION INTRAVENOUS at 08:53

## 2023-11-30 RX ADMIN — Medication 10 ML: at 21:36

## 2023-11-30 RX ADMIN — AMITRIPTYLINE HYDROCHLORIDE 50 MG: 50 TABLET, FILM COATED ORAL at 21:35

## 2023-11-30 RX ADMIN — AMOXICILLIN AND CLAVULANATE POTASSIUM 1 TABLET: 875; 125 TABLET, FILM COATED ORAL at 21:35

## 2023-11-30 RX ADMIN — POTASSIUM CHLORIDE 20 MEQ: 750 CAPSULE, EXTENDED RELEASE ORAL at 18:20

## 2023-11-30 NOTE — PLAN OF CARE
"Goal Outcome Evaluation:              Outcome Evaluation: Patient has had multiple complaints and needs throughout this shift. Complaints different times of IVs bothering her, but refuses change of IV site. c/o back pain, leg pain, headache, generalized discomfort, \"dry heaving\", inability to sleep. Patient has rested for about 2 hours after Benadryl given. VS stable. Ambulated 20 feet into hallway with walker and gait belt.         "

## 2023-11-30 NOTE — PLAN OF CARE
Goal Outcome Evaluation:                     Rested throughout shift with c/o of pain and discomfort as well as intermittent nausea. Given PRN medication as ordered. Plan of care continues.

## 2023-11-30 NOTE — PROGRESS NOTES
[facility]     Progress Note    Patient Name: Lona Rodríguez  : 1950  MRN: [id number]  Primary Care Physician:  Christopher Thao MD  Date of admission: 2023      Subjective   Brief summary.  Follow-up hypokalemia      HPI:  Feeling better.  Still dizzy but less.  Lightheaded.  Complains of leg cramps and decreased sleep.  Nausea resolved    Review of Systems     No chest pain, no palpitations no shortness of breath      Objective     Vitals:   Temp:  [97.5 °F (36.4 °C)-98.2 °F (36.8 °C)] 97.8 °F (36.6 °C)  Heart Rate:  [83-97] 88  Resp:  [16-18] 18  BP: ()/(53-69) 127/61    Physical Exam :     Elderly obese female not in distress.  Heart regular.  Lungs clear.  Abdomen obese and soft nontender.  Extremities trace of edema.      Result Review:  I have personally reviewed the results from the time of this admission to 2023 06:17 EST and agree with these findings:  [x]  Laboratory  []  Microbiology  []  Radiology  []  EKG/Telemetry   []  Cardiology/Vascular   []  Pathology  []  Old records  []  Other:         3.3    Assessment / Plan       Active Hospital Problems:  Active Hospital Problems    Diagnosis     **Hypokalemia     Dizziness     Nausea and vomiting        Plan:   Continue replacement with potassium  Increase activity  Check orthostats  Repeat labs in a.m.  Restarted Robaxin and Benadryl as needed for bed time  Discharge home tomorrow if remains stable       DVT prophylaxis:  Medical DVT prophylaxis orders are present.    CODE STATUS:   Code Status (Patient has no pulse and is not breathing): CPR (Attempt to Resuscitate)  Medical Interventions (Patient has pulse or is breathing): Full Support            Electronically signed by Luiz Diana MD, 23, 6:17 AM EST.

## 2023-12-01 ENCOUNTER — TELEPHONE (OUTPATIENT)
Dept: SURGERY | Facility: CLINIC | Age: 73
End: 2023-12-01
Payer: MEDICARE

## 2023-12-01 LAB
ANION GAP SERPL CALCULATED.3IONS-SCNC: 9.5 MMOL/L (ref 5–15)
BUN SERPL-MCNC: 5 MG/DL (ref 8–23)
BUN/CREAT SERPL: 9.3 (ref 7–25)
CALCIUM SPEC-SCNC: 8.2 MG/DL (ref 8.6–10.5)
CHLORIDE SERPL-SCNC: 89 MMOL/L (ref 98–107)
CO2 SERPL-SCNC: 27.5 MMOL/L (ref 22–29)
CREAT SERPL-MCNC: 0.54 MG/DL (ref 0.57–1)
EGFRCR SERPLBLD CKD-EPI 2021: 97.4 ML/MIN/1.73
GLUCOSE SERPL-MCNC: 87 MG/DL (ref 65–99)
LIPASE SERPL-CCNC: 17 U/L (ref 13–60)
POTASSIUM SERPL-SCNC: 3.4 MMOL/L (ref 3.5–5.2)
SODIUM SERPL-SCNC: 126 MMOL/L (ref 136–145)
SODIUM UR-SCNC: <20 MMOL/L
TSH SERPL DL<=0.05 MIU/L-ACNC: 2.9 UIU/ML (ref 0.27–4.2)

## 2023-12-01 PROCEDURE — 25010000002 ENOXAPARIN PER 10 MG: Performed by: INTERNAL MEDICINE

## 2023-12-01 PROCEDURE — 83690 ASSAY OF LIPASE: CPT | Performed by: INTERNAL MEDICINE

## 2023-12-01 PROCEDURE — 84443 ASSAY THYROID STIM HORMONE: CPT | Performed by: INTERNAL MEDICINE

## 2023-12-01 PROCEDURE — 80048 BASIC METABOLIC PNL TOTAL CA: CPT | Performed by: INTERNAL MEDICINE

## 2023-12-01 PROCEDURE — 97161 PT EVAL LOW COMPLEX 20 MIN: CPT

## 2023-12-01 PROCEDURE — 84300 ASSAY OF URINE SODIUM: CPT | Performed by: INTERNAL MEDICINE

## 2023-12-01 PROCEDURE — 94799 UNLISTED PULMONARY SVC/PX: CPT

## 2023-12-01 PROCEDURE — 63710000001 DIPHENHYDRAMINE PER 50 MG: Performed by: INTERNAL MEDICINE

## 2023-12-01 PROCEDURE — 25010000002 ONDANSETRON PER 1 MG: Performed by: INTERNAL MEDICINE

## 2023-12-01 PROCEDURE — 99222 1ST HOSP IP/OBS MODERATE 55: CPT | Performed by: INTERNAL MEDICINE

## 2023-12-01 PROCEDURE — 25010000002 SODIUM CHLORIDE 0.9 % WITH KCL 20 MEQ 20-0.9 MEQ/L-% SOLUTION: Performed by: INTERNAL MEDICINE

## 2023-12-01 PROCEDURE — 83935 ASSAY OF URINE OSMOLALITY: CPT | Performed by: INTERNAL MEDICINE

## 2023-12-01 PROCEDURE — 94664 DEMO&/EVAL PT USE INHALER: CPT

## 2023-12-01 RX ORDER — DEXTROSE MONOHYDRATE, SODIUM CHLORIDE, AND POTASSIUM CHLORIDE 50; 1.49; 9 G/1000ML; G/1000ML; G/1000ML
75 INJECTION, SOLUTION INTRAVENOUS CONTINUOUS
Status: DISCONTINUED | OUTPATIENT
Start: 2023-12-01 | End: 2023-12-04

## 2023-12-01 RX ADMIN — ACETAMINOPHEN 650 MG: 325 TABLET ORAL at 02:55

## 2023-12-01 RX ADMIN — ENOXAPARIN SODIUM 40 MG: 100 INJECTION SUBCUTANEOUS at 09:27

## 2023-12-01 RX ADMIN — AMITRIPTYLINE HYDROCHLORIDE 50 MG: 50 TABLET, FILM COATED ORAL at 20:33

## 2023-12-01 RX ADMIN — HYDROCODONE BITARTRATE AND ACETAMINOPHEN 1 TABLET: 7.5; 325 TABLET ORAL at 04:24

## 2023-12-01 RX ADMIN — ONDANSETRON 4 MG: 2 INJECTION INTRAMUSCULAR; INTRAVENOUS at 00:30

## 2023-12-01 RX ADMIN — IPRATROPIUM BROMIDE AND ALBUTEROL SULFATE 3 ML: .5; 3 SOLUTION RESPIRATORY (INHALATION) at 00:50

## 2023-12-01 RX ADMIN — IPRATROPIUM BROMIDE AND ALBUTEROL SULFATE 3 ML: .5; 3 SOLUTION RESPIRATORY (INHALATION) at 11:57

## 2023-12-01 RX ADMIN — DOCUSATE SODIUM 50MG AND SENNOSIDES 8.6MG 2 TABLET: 8.6; 5 TABLET, FILM COATED ORAL at 20:32

## 2023-12-01 RX ADMIN — FAMOTIDINE 20 MG: 10 INJECTION INTRAVENOUS at 09:27

## 2023-12-01 RX ADMIN — HYDROCODONE BITARTRATE AND ACETAMINOPHEN 1 TABLET: 7.5; 325 TABLET ORAL at 15:31

## 2023-12-01 RX ADMIN — ACETAMINOPHEN 650 MG: 325 TABLET ORAL at 20:32

## 2023-12-01 RX ADMIN — POTASSIUM CHLORIDE, DEXTROSE MONOHYDRATE AND SODIUM CHLORIDE 75 ML/HR: 150; 5; 900 INJECTION, SOLUTION INTRAVENOUS at 12:49

## 2023-12-01 RX ADMIN — METHOCARBAMOL 500 MG: 500 TABLET ORAL at 20:33

## 2023-12-01 RX ADMIN — Medication 10 ML: at 20:32

## 2023-12-01 RX ADMIN — POTASSIUM CHLORIDE 20 MEQ: 750 CAPSULE, EXTENDED RELEASE ORAL at 09:27

## 2023-12-01 RX ADMIN — IPRATROPIUM BROMIDE AND ALBUTEROL SULFATE 3 ML: .5; 3 SOLUTION RESPIRATORY (INHALATION) at 20:00

## 2023-12-01 RX ADMIN — DIPHENHYDRAMINE HYDROCHLORIDE 25 MG: 25 CAPSULE ORAL at 22:36

## 2023-12-01 RX ADMIN — FAMOTIDINE 20 MG: 10 INJECTION INTRAVENOUS at 20:32

## 2023-12-01 RX ADMIN — METHOCARBAMOL 500 MG: 500 TABLET ORAL at 09:59

## 2023-12-01 RX ADMIN — HYDROCODONE BITARTRATE AND ACETAMINOPHEN 1 TABLET: 7.5; 325 TABLET ORAL at 22:36

## 2023-12-01 RX ADMIN — POTASSIUM CHLORIDE 20 MEQ: 750 CAPSULE, EXTENDED RELEASE ORAL at 18:05

## 2023-12-01 RX ADMIN — Medication 10 ML: at 09:28

## 2023-12-01 RX ADMIN — ONDANSETRON 4 MG: 2 INJECTION INTRAMUSCULAR; INTRAVENOUS at 18:20

## 2023-12-01 RX ADMIN — ACETAMINOPHEN 650 MG: 325 TABLET ORAL at 13:07

## 2023-12-01 RX ADMIN — AMOXICILLIN AND CLAVULANATE POTASSIUM 1 TABLET: 875; 125 TABLET, FILM COATED ORAL at 09:59

## 2023-12-01 RX ADMIN — HYDROCODONE BITARTRATE AND ACETAMINOPHEN 1 TABLET: 7.5; 325 TABLET ORAL at 09:58

## 2023-12-01 RX ADMIN — LEVOTHYROXINE SODIUM 75 MCG: 75 TABLET ORAL at 05:48

## 2023-12-01 RX ADMIN — AMOXICILLIN AND CLAVULANATE POTASSIUM 1 TABLET: 875; 125 TABLET, FILM COATED ORAL at 20:32

## 2023-12-01 RX ADMIN — POTASSIUM CHLORIDE AND SODIUM CHLORIDE 50 ML/HR: 900; 150 INJECTION, SOLUTION INTRAVENOUS at 04:34

## 2023-12-01 RX ADMIN — IPRATROPIUM BROMIDE AND ALBUTEROL SULFATE 3 ML: .5; 3 SOLUTION RESPIRATORY (INHALATION) at 06:46

## 2023-12-01 NOTE — NURSING NOTE
"Pt transfer from 2N to 3NT [facility]- room 3019. Fluids continued at 50 ml/hr. A+O x4. Room air. Clear liquid diet. No skin issues noted. Pt has no complaints of nausea or pain at this time, however, she states she has had significantly decreased appetite and recent weight loss. She states she \"wants to figure out what's going on with her stomach.\" Will cont POC   "

## 2023-12-01 NOTE — PLAN OF CARE
Problem: Adult Inpatient Plan of Care  Goal: Plan of Care Review  Outcome: Ongoing, Progressing  Flowsheets (Taken 12/1/2023 0652)  Plan of Care Reviewed With: patient  Outcome Evaluation: Patient alert orientedx4. VSS. Pt C/O pain  back pain, headache. generalized discomfort. PRN NORCO given 2x throughout this shift. VSS. pt expresses no other needs at this time . Call light within reach.  Goal: Patient-Specific Goal (Individualized)  Outcome: Ongoing, Progressing  Goal: Absence of Hospital-Acquired Illness or Injury  Outcome: Ongoing, Progressing  Goal: Optimal Comfort and Wellbeing  Outcome: Ongoing, Progressing  Goal: Readiness for Transition of Care  Outcome: Ongoing, Progressing     Problem: Hypertension Comorbidity  Goal: Blood Pressure in Desired Range  Outcome: Ongoing, Progressing     Problem: Fall Injury Risk  Goal: Absence of Fall and Fall-Related Injury  Outcome: Ongoing, Progressing   Goal Outcome Evaluation:  Plan of Care Reviewed With: patient           Outcome Evaluation: Patient alert orientedx4. VSS. Pt C/O pain  back pain, headache. generalized discomfort. PRN NORCO given 2x throughout this shift. VSS. pt expresses no other needs at this time . Call light within reach.

## 2023-12-01 NOTE — CONSULTS
[facility]   Consult Note    Patient Name: Lona Rodríguez  : 1950  MRN: [id number]  Primary Care Physician:  Christopher Thao MD  Referring Physician: No Known Provider  Date of admission: 2023    Subjective   Subjective     Reason for Consult/ Chief Complaint: Nausea and vomiting.  Weight loss    HPI:  Lona Rodríguez is a 73 y.o. female admitted on 2023 for chief complaint of nausea and vomiting and generalized weakness.  Patient stated that she has had some intermittent symptoms of anorexia and poor appetite for almost 2 years.  In the last 2 months the symptoms have been somewhat worse.  About a month a month and a half ago she had an episode of diarrhea with worsening nausea and vomiting and was treated with metronidazole for possible infection.  Her diarrhea improved but she has continued to have anorexia with nausea and vomiting.  She has been unable to keep anything down.  She has lost significant amount of weight but is unable to quantify.  She feels that even the look of food makes her nauseated.  She does not have any typical dysphagia symptoms.    Patient has history of gastroesophageal reflux disease and has been on PPI at home.  Has had upper endoscopies in the past but I do not see any records in the current medical chart.  She also states that she has had a colonoscopy for polyps in the past and is probably due for a recall colonoscopy.  Currently she feels somewhat constipated.  There is no diarrhea.  No hematochezia or melena.    She has some mild upper abdominal diffuse nonspecific discomfort especially when she is nauseated.  No significant pain as such.    No prior history of any gallbladder disease.  She has had peptic ulcer disease in the remote past.    On admission she was found to have significant hyponatremia and hypokalemia.  Electrolyte replacements are underway.  Hemoglobin is normal.    Patient has CT of the chest as well as CT of the head without contrast  that was normal contributory.  She had a PET scan in September 2023 that was also unremarkable.    She had history of breast cancer more than 10 years ago and has had lumpectomy with radiation and chemotherapy.    Patient reports no smoking or alcohol abuse.  Denies any NSAID use.    Review of Systems     All systems were reviewed and negative except for: Significant weight loss and significant GI symptoms are described.  She also has fibromyalgia and chronic pain.  She also has had peripheral edema for which she has been taking diuretics.  No cardiac disease.  States that she had recent cardiac evaluation that was negative.  Dizziness on admission which has improved.      Personal History     Past Medical History:   Diagnosis Date    AMD (age related macular degeneration)     Anemia Following surgery    Arthritis     Asthma     Breast cancer Left breast.    Breast mass 2 lumpectomies 1 cancerous 1 benign    CHF (congestive heart failure) Just getting evaluation    Colon polyp Removed during colonos    COPD (chronic obstructive pulmonary disease)     Coronary artery disease Chf    Deep vein thrombosis 1979    Fibrocystic breast Left breast    Fibromyalgia     History of transfusion Aug 2019 2 units    Hyperlipidemia     Hypertension     Obesity     Polymyalgia arteritica     Postoperative wound infection Lymph node resection site,5th toe left foot infection after neuroma surgery. 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    Sciatica        Past Surgical History:   Procedure Laterality Date    BREAST BIOPSY  Left    BREAST SURGERY  Lumpectomies left breast    EYE SURGERY  Lens implants both eyes    REPLACEMENT TOTAL KNEE      TONSILLECTOMY         Family History: family history includes Arthritis in her brother, father, and mother; COPD in her father; Cancer in her mother; Diabetes in her brother; Heart disease in her mother; Learning disabilities in her brother; Mental illness in her maternal aunt. Otherwise pertinent FHx was  reviewed and not pertinent to current issue.    Social History:  reports that she has never smoked. She has never used smokeless tobacco. She reports that she does not drink alcohol and does not use drugs.    Home Medications:  HYDROcodone-acetaminophen, acetaminophen, amitriptyline, atorvastatin, levothyroxine, meclizine, methocarbamol, metoclopramide, montelukast, omeprazole, and vitamin D    Allergies:  Allergies   Allergen Reactions    Morphine Anaphylaxis       Objective    Objective     Vitals:   Temp:  [97.5 °F (36.4 °C)-98 °F (36.7 °C)] 97.9 °F (36.6 °C)  Heart Rate:  [86-93] 89  Resp:  [18-20] 20  BP: ()/(46-80) 124/58    Physical Exam:   Constitutional: Awake, alert.  Overweight   Eyes: PERRLA, sclerae anicteric, no conjunctival injection   HENT: NCAT, mucous membranes moist   Neck: Supple, no thyromegaly, no lymphadenopathy, trachea midline   Respiratory: Clear to auscultation bilaterally, nonlabored respirations    Cardiovascular: RRR, loud 3 x 6 systolic ejection murmur, rubs, or gallops, palpable pedal pulses bilaterally   Gastrointestinal: Positive bowel sounds, soft, minimal tenderness diffusely in the upper abdomen, nondistended   Musculoskeletal: No bilateral ankle edema, no clubbing or cyanosis to extremities   Psychiatric: Appropriate affect, cooperative   Neurologic: Oriented x 3, strength symmetric in all extremities, Cranial Nerves grossly intact to confrontation, speech clear   Skin: No rashes     Scheduled Meds:amitriptyline, 50 mg, Oral, Nightly  amoxicillin-clavulanate, 1 tablet, Oral, Q12H  enoxaparin, 40 mg, Subcutaneous, Daily  famotidine, 20 mg, Intravenous, Q12H  ipratropium-albuterol, 3 mL, Nebulization, Q6H - RT  levothyroxine, 75 mcg, Oral, Q AM  methocarbamol, 500 mg, Oral, Q12H  potassium chloride, 20 mEq, Oral, BID With Meals  senna-docusate sodium, 2 tablet, Oral, Nightly  sodium chloride, 10 mL, Intravenous, Q12H      Continuous Infusions:dextrose 5 % and sodium chloride  "0.9 % with KCl 20 mEq, 75 mL/hr, Last Rate: 75 mL/hr (12/01/23 1249)  sodium chloride 0.9 % with KCl 20 mEq, 50 mL/hr, Last Rate: 50 mL/hr (12/01/23 0434)      PRN Meds:.  acetaminophen **OR** acetaminophen **OR** acetaminophen    senna-docusate sodium **AND** polyethylene glycol **AND** bisacodyl **AND** bisacodyl    diphenhydrAMINE    HYDROcodone-acetaminophen    nitroglycerin    ondansetron    sodium chloride    sodium chloride    sodium chloride      Result Review    Result Review:  I have personally reviewed the results from the time of this admission to 12/1/2023 15:25 EST and agree with these findings:    Lab Results (last 24 hours)       Procedure Component Value Units Date/Time    Lipase [[id number]]  (Normal) Collected: 12/01/23 0954    Specimen: Blood from Arm, Right Updated: 12/01/23 1141     Lipase 17 U/L     Basic Metabolic Panel [[id number]]  (Abnormal) Collected: 12/01/23 0954    Specimen: Blood from Arm, Right Updated: 12/01/23 1017     Glucose 87 mg/dL      BUN 5 mg/dL      Creatinine 0.54 mg/dL      Sodium 126 mmol/L      Potassium 3.4 mmol/L      Chloride 89 mmol/L      CO2 27.5 mmol/L      Calcium 8.2 mg/dL      BUN/Creatinine Ratio 9.3     Anion Gap 9.5 mmol/L      eGFR 97.4 mL/min/1.73     Narrative:      GFR Normal >60  Chronic Kidney Disease <60  Kidney Failure <15    The GFR formula is only valid for adults with stable renal function between ages 18 and 70.               No results found for: \"NOCARDIACX\", \"STOOLCX\"      Imaging Results (Last 24 Hours)       ** No results found for the last 24 hours. **              Assessment & Plan   Assessment / Plan     Brief Patient Summary:  Lona Rodríguez is a 73 y.o. female who has the following assessment    #1 nausea and vomiting    Patient has had anorexia for almost 2 years but worsening symptoms of nausea and vomiting for the past 2 months.  Significant weight loss is associated with that.  Possible etiologies include gastric outlet obstruction " or upper GI malignancy or peptic ulcer disease.    #2 gastroesophageal reflux disease    Patient was on PPI at home.  Remote history of EGD.  No records available    #3 weight loss    With her history of breast cancer recurrent breast cancer is a remote possibility.  PET scan however has been negative in September 2023    #4 hypokalemia and hyponatremia    Active Hospital Problems:  Active Hospital Problems    Diagnosis     **Hypokalemia     Dizziness     Nausea and vomiting      Plan:     #1 will proceed with EGD for further evaluation.  Risk and complications were explained and discussed with the patient in detail.  She verbalized understanding and wishes to proceed.    #2 check urine lites and osmolality and TSH.  Rule out SIADH.  Continue fluid and electrolyte replacement as per primary team    #3 continue Pepcid    Electronically signed by Maximino Aceves MD, 12/01/23, 3:25 PM EST.

## 2023-12-01 NOTE — THERAPY EVALUATION
Acute Care - Physical Therapy Initial Evaluation  [facility]     Patient Name: Lona Rodríguez  : 1950  MRN: [id number]  Today's Date: 2023      Visit Dx:     ICD-10-CM ICD-9-CM   1. Hypokalemia  E87.6 276.8   2. Weakness  R53.1 780.79   3. Difficulty walking  R26.2 719.7     Patient Active Problem List   Diagnosis    Intractable pain    Wrist fracture    Rib fractures    Lung nodule    Dyspnea    Closed fracture of one rib, unspecified laterality, initial encounter    Breast asymmetry in female    Breast skin changes    Hypokalemia    Dizziness    Nausea and vomiting     Past Medical History:   Diagnosis Date    AMD (age related macular degeneration)     Anemia Following surgery    Arthritis     Asthma     Breast cancer Left breast.    Breast mass 2 lumpectomies 1 cancerous 1 benign    CHF (congestive heart failure) Just getting evaluation    Colon polyp Removed during colonos    COPD (chronic obstructive pulmonary disease)     Coronary artery disease Chf    Deep vein thrombosis     Fibrocystic breast Left breast    Fibromyalgia     History of transfusion Aug 2019 2 units    Hyperlipidemia     Hypertension     Obesity     Polymyalgia arteritica     Postoperative wound infection Lymph node resection site,5th toe left foot infection after neuroma surgery. 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    Sciatica      Past Surgical History:   Procedure Laterality Date    BREAST BIOPSY  Left    BREAST SURGERY  Lumpectomies left breast    EYE SURGERY  Lens implants both eyes    REPLACEMENT TOTAL KNEE      TONSILLECTOMY       PT Assessment (last 12 hours)       PT Evaluation and Treatment       Row Name 23 1400          Physical Therapy Time and Intention    Document Type evaluation  -AV     Mode of Treatment individual therapy;physical therapy  -AV     Symptoms Noted During/After Treatment fatigue  -AV       Row Name 23 1400          General Information    Patient Profile Reviewed yes  -AV     Prior Level of  Function --  Reports being primarily (I), neighbor can assist if needed. Primarily uses w/c for mobility for the last few months due to experiencing ~14 falls while ambulating with RW. No home O2.  -AV     Equipment Currently Used at Home walker, rolling;wheelchair  -AV     Existing Precautions/Restrictions fall  -AV       Row Name 12/01/23 1400          Living Environment    Current Living Arrangements home  -AV     People in Home alone  Neighbor can assist if needed  -AV       Row Name 12/01/23 1400          Pain    Pretreatment Pain Rating 7/10  -AV     Posttreatment Pain Rating 7/10  -AV     Pain Location - Side/Orientation Bilateral  -AV     Pain Location lower  -AV     Pain Location - extremity  -AV     Pain Intervention(s) --  Nurse in room administering pain medication  -AV       Row Name 12/01/23 1400          Cognition    Orientation Status (Cognition) oriented x 3  -AV       Row Name 12/01/23 1400          Range of Motion (ROM)    Range of Motion bilateral lower extremities;ROM is WFL  -AV       Row Name 12/01/23 1400          Strength (Manual Muscle Testing)    Strength (Manual Muscle Testing) bilateral lower extremities  3-/5 bilateral hip flexion; 4-/5 bilateral knee extension  -AV       Row Name 12/01/23 1400          Bed Mobility    Bed Mobility supine-sit;sit-supine  -AV     Supine-Sit Velpen (Bed Mobility) contact guard  -AV     Sit-Supine Velpen (Bed Mobility) minimum assist (75% patient effort)  -AV     Bed Mobility, Safety Issues decreased use of legs for bridging/pushing;decreased use of arms for pushing/pulling  -AV       Row Name 12/01/23 1400          Transfers    Transfers sit-stand transfer;stand-sit transfer  -AV       Row Name 12/01/23 1400          Sit-Stand Transfer    Sit-Stand Velpen (Transfers) contact guard  -AV     Assistive Device (Sit-Stand Transfers) bariatric;walker, standard  -AV       Row Name 12/01/23 1400          Stand-Sit Transfer    Stand-Sit  [place] (Transfers) contact guard  -AV     Assistive Device (Stand-Sit Transfers) bariatric;walker, standard  -AV       Row Name 12/01/23 1400          Gait/Stairs (Locomotion)    Gait/Stairs Locomotion gait/ambulation independence;gait/ambulation assistive device;distance ambulated  -AV     [place] Level (Gait) contact guard  -AV     Assistive Device (Gait) bariatric equipment;walker, standard  -AV     Distance in Feet (Gait) 12  sidestepping right and left along EOB  -AV       Row Name 12/01/23 1400          Safety Issues, Functional Mobility    Impairments Affecting Function (Mobility) balance;endurance/activity tolerance;strength;pain  -AV       Row Name 12/01/23 1400          Balance    Balance Assessment standing dynamic balance  -AV     Dynamic Standing Balance contact guard  -AV     Position/Device Used, Standing Balance supported;walker, standard  -AV       Row Name 12/01/23 1400          Plan of Care Review    Plan of Care Reviewed With patient  -AV     Progress no change  -AV     Outcome Evaluation Patient presents with deficits in balance, endurance, transfers, and ambulation. Patient will benefit from skilled PT services to address these mobility deficits and decrease risk of falls.  -AV       Row Name 12/01/23 1400          Therapy Assessment/Plan (PT)    Rehab Potential (PT) good, to achieve stated therapy goals  -AV     Criteria for Skilled Interventions Met (PT) yes;meets criteria  -AV     Therapy Frequency (PT) daily  -AV     Predicted Duration of Therapy Intervention (PT) 10 days  -AV     Problem List (PT) problems related to;balance;mobility;sensation  -AV     Activity Limitations Related to Problem List (PT) unable to transfer safely;unable to ambulate safely  -AV       Row Name 12/01/23 1400          PT Evaluation Complexity    History, PT Evaluation Complexity 1-2 personal factors and/or comorbidities  -AV     Examination of Body Systems (PT Eval Complexity) total of 4 or more  elements  -AV     Clinical Presentation (PT Evaluation Complexity) stable  -AV     Clinical Decision Making (PT Evaluation Complexity) low complexity  -AV     Overall Complexity (PT Evaluation Complexity) low complexity  -AV       Row Name 12/01/23 1400          Therapy Plan Review/Discharge Plan (PT)    Therapy Plan Review (PT) evaluation/treatment results reviewed;patient  -AV       Row Name 12/01/23 1400          Physical Therapy Goals    Bed Mobility Goal Selection (PT) bed mobility, PT goal 1  -AV     Transfer Goal Selection (PT) transfer, PT goal 1  -AV     Gait Training Goal Selection (PT) gait training, PT goal 1  -AV       Row Name 12/01/23 1400          Bed Mobility Goal 1 (PT)    Activity/Assistive Device (Bed Mobility Goal 1, PT) bed mobility activities, all  -AV     [place] Level/Cues Needed (Bed Mobility Goal 1, PT) independent  -AV     Time Frame (Bed Mobility Goal 1, PT) 10 days  -AV       Row Name 12/01/23 1400          Transfer Goal 1 (PT)    Activity/Assistive Device (Transfer Goal 1, PT) sit-to-stand/stand-to-sit;bed-to-chair/chair-to-bed;walker, rolling  -AV     [place] Level/Cues Needed (Transfer Goal 1, PT) modified independence  -AV     Time Frame (Transfer Goal 1, PT) 10 days  -AV       Row Name 12/01/23 1400          Gait Training Goal 1 (PT)    Activity/Assistive Device (Gait Training Goal 1, PT) gait (walking locomotion);assistive device use;walker, rolling  -AV     [place] Level (Gait Training Goal 1, PT) standby assist  -AV     Distance (Gait Training Goal 1, PT) 50  -AV     Time Frame (Gait Training Goal 1, PT) 10 days  -AV               User Key  (r) = Recorded By, (t) = Taken By, (c) = Cosigned By      Initials Name Provider Type    AV Alexandre Mims, PT Physical Therapist                    Physical Therapy Education       Title: PT OT SLP Therapies (In Progress)       Topic: Physical Therapy (In Progress)       Point: Mobility training (Done)       Learning  Progress Summary             Patient Acceptance, E,TB, VU by AV at 12/1/2023 1440                         Point: Home exercise program (Not Started)       Learner Progress:  Not documented in this visit.              Point: Body mechanics (Done)       Learning Progress Summary             Patient Acceptance, E,TB, VU by AV at 12/1/2023 1440                         Point: Precautions (Done)       Learning Progress Summary             Patient Acceptance, E,TB, VU by AV at 12/1/2023 1440                                         User Key       Initials Effective Dates Name Provider Type Discipline    AV 06/11/21 -  Alexandre Mims, PT Physical Therapist PT                  PT Recommendation and Plan  Anticipated Discharge Disposition (PT): home with home health  Planned Therapy Interventions (PT): balance training, bed mobility training, gait training, home exercise program, neuromuscular re-education, strengthening, transfer training  Therapy Frequency (PT): daily  Plan of Care Reviewed With: patient  Progress: no change  Outcome Evaluation: Patient presents with deficits in balance, endurance, transfers, and ambulation. Patient will benefit from skilled PT services to address these mobility deficits and decrease risk of falls.   Outcome Measures       Row Name 12/01/23 1400             How much help from another person do you currently need...    Turning from your back to your side while in flat bed without using bedrails? 4  -AV      Moving from lying on back to sitting on the side of a flat bed without bedrails? 3  -AV      Moving to and from a bed to a chair (including a wheelchair)? 3  -AV      Standing up from a chair using your arms (e.g., wheelchair, bedside chair)? 3  -AV      Climbing 3-5 steps with a railing? 2  -AV      To walk in hospital room? 3  -AV      AM-PAC 6 Clicks Score (PT) 18  -AV      Highest Level of Mobility Goal 6 --> Walk 10 steps or more  -AV         Functional Assessment    Outcome  Measure Options AM-PAC 6 Clicks Basic Mobility (PT)  -AV                User Key  (r) = Recorded By, (t) = Taken By, (c) = Cosigned By      Initials Name Provider Type    Alexandre Goldberg, PT Physical Therapist                     Time Calculation:    PT Charges       Row Name 12/01/23 1440             Time Calculation    PT Received On 12/01/23  -AV      PT Goal Re-Cert Due Date 12/10/23  -AV         Untimed Charges    PT Eval/Re-eval Minutes 35  -AV         Total Minutes    Untimed Charges Total Minutes 35  -AV       Total Minutes 35  -AV                User Key  (r) = Recorded By, (t) = Taken By, (c) = Cosigned By      Initials Name Provider Type    Alexandre Goldberg, HAYDEE Physical Therapist                  Therapy Charges for Today       Code Description Service Date Service Provider Modifiers Qty    [id number] HC PT EVAL LOW COMPLEXITY 3 12/1/2023 Alexandre Mims, PT GP 1            PT G-Codes  Outcome Measure Options: AM-PAC 6 Clicks Basic Mobility (PT)  AM-PAC 6 Clicks Score (PT): 18    Alexandre Mims PT  12/1/2023

## 2023-12-01 NOTE — PROGRESS NOTES
[facility]     Progress Note    Patient Name: Lona Rodríguez  : 1950  MRN: [id number]  Primary Care Physician:  Christopher Thao MD  Date of admission: 2023      Subjective   Brief summary.  Follow-up hypokalemia      HPI:  Patient seen earlier this morning, complains of many issues today complains of hurting all over, weak, still dizzy when she gets up.  Denies any sensations of spinning, but patient would not ambiguous it statements.  Also complains of abdominal pain, she reports that she has been complaining of abdominal pain for almost 6 weeks with persistent nausea and vomiting.  Continues to have cramps.    Review of Systems     No chest pain, no palpitations no shortness of breath  Dizziness and weakness,  Abdominal pain, complains of bloating.  Nausea and vomiting for several weeks but no vomiting in hospital since admission.  No black-colored stools  Concerned about leg aching and pain and cramps  Concerned about swelling in the legs now, she reports she was on diuretics at home which were stopped couple of days before she got admitted      Objective     Vitals:   Temp:  [97.5 °F (36.4 °C)-98 °F (36.7 °C)] 97.6 °F (36.4 °C)  Heart Rate:  [86-93] 89  Resp:  [18-20] 18  BP: ()/(41-80) 98/41    Physical Exam :     Elderly obese female not in distress, but anxious pallor noted.   Neck without any JVD  Heart regular.  Lungs clear, diminished breath sounds no crackles.  Abdomen obese and soft mild epigastric tenderness.  Extremities trace of edema.      Result Review:  I have personally reviewed the results from the time of this admission to 2023 17:28 EST and agree with these findings:  [x]  Laboratory  []  Microbiology  []  Radiology  []  EKG/Telemetry   []  Cardiology/Vascular   []  Pathology  []  Old records  []  Other:         Potassium 3.4, sodium 126    Assessment / Plan       Active Hospital Problems:  Active Hospital Problems    Diagnosis     **Hypokalemia     Dizziness      Nausea and vomiting        Plan:   Potassium improving slowly.  Hypokalemia most likely related to diuretics use as an outpatient.  Patient's volume status is normalized now, continue gentle hydration and replacement of potassium.  Concerned about abdominal discomfort and persistent nausea, rule out gallbladder disease.  Ultrasound gallbladder ordered.  Consult GI for further evaluation, rule out peptic ulcer disease.  Continue Pepcid.  Check lipase.  Patient concerned about swelling in legs, minimal bilateral swelling noted.  Will check ultrasound most likely volume issue.  No symptoms of CHF or pulmonary issues, will hold off diuretics at this point.    Check labs in a.m.       DVT prophylaxis:  Medical DVT prophylaxis orders are present.    CODE STATUS:   Code Status (Patient has no pulse and is not breathing): CPR (Attempt to Resuscitate)  Medical Interventions (Patient has pulse or is breathing): Full Support    .Total  time spent in patient care, approximately 53.minutes.  I personally saw and examined the patient. I have reviewed all diagnostic interpretations including labs and x-rays, also I have reviewed treatment plans as written. I was present for care of my patient, time includes patient management by me, time spent at the patients bedside/exam,  time to review lab and imaging results, discussing patient care with nursing staff, consultants and documentation in the medical record.      Electronically signed by Luiz Diana MD, 12/01/23, 5:32 PM EST.

## 2023-12-02 ENCOUNTER — APPOINTMENT (OUTPATIENT)
Dept: CT IMAGING | Facility: HOSPITAL | Age: 73
DRG: 641 | End: 2023-12-02
Payer: MEDICARE

## 2023-12-02 ENCOUNTER — APPOINTMENT (OUTPATIENT)
Dept: ULTRASOUND IMAGING | Facility: HOSPITAL | Age: 73
DRG: 641 | End: 2023-12-02
Payer: MEDICARE

## 2023-12-02 ENCOUNTER — ANESTHESIA (OUTPATIENT)
Dept: GASTROENTEROLOGY | Facility: HOSPITAL | Age: 73
End: 2023-12-02
Payer: MEDICARE

## 2023-12-02 ENCOUNTER — ANESTHESIA EVENT (OUTPATIENT)
Dept: GASTROENTEROLOGY | Facility: HOSPITAL | Age: 73
End: 2023-12-02
Payer: MEDICARE

## 2023-12-02 LAB
ALBUMIN SERPL-MCNC: 2.4 G/DL (ref 3.5–5.2)
ALBUMIN/GLOB SERPL: 0.9 G/DL
ALP SERPL-CCNC: 128 U/L (ref 39–117)
ALT SERPL W P-5'-P-CCNC: 10 U/L (ref 1–33)
ANION GAP SERPL CALCULATED.3IONS-SCNC: 7.4 MMOL/L (ref 5–15)
AST SERPL-CCNC: 20 U/L (ref 1–32)
BASOPHILS # BLD AUTO: 0.06 10*3/MM3 (ref 0–0.2)
BASOPHILS NFR BLD AUTO: 1.1 % (ref 0–1.5)
BILIRUB SERPL-MCNC: 0.4 MG/DL (ref 0–1.2)
BUN SERPL-MCNC: 4 MG/DL (ref 8–23)
BUN/CREAT SERPL: 6.9 (ref 7–25)
CALCIUM SPEC-SCNC: 8.3 MG/DL (ref 8.6–10.5)
CHLORIDE SERPL-SCNC: 98 MMOL/L (ref 98–107)
CO2 SERPL-SCNC: 28.6 MMOL/L (ref 22–29)
CREAT SERPL-MCNC: 0.58 MG/DL (ref 0.57–1)
DEPRECATED RDW RBC AUTO: 54.4 FL (ref 37–54)
EGFRCR SERPLBLD CKD-EPI 2021: 95.7 ML/MIN/1.73
EOSINOPHIL # BLD AUTO: 0.32 10*3/MM3 (ref 0–0.4)
EOSINOPHIL NFR BLD AUTO: 5.9 % (ref 0.3–6.2)
ERYTHROCYTE [DISTWIDTH] IN BLOOD BY AUTOMATED COUNT: 17.2 % (ref 12.3–15.4)
GLOBULIN UR ELPH-MCNC: 2.7 GM/DL
GLUCOSE SERPL-MCNC: 90 MG/DL (ref 65–99)
HCT VFR BLD AUTO: 33.9 % (ref 34–46.6)
HGB BLD-MCNC: 11.1 G/DL (ref 12–15.9)
IMM GRANULOCYTES # BLD AUTO: 0.09 10*3/MM3 (ref 0–0.05)
IMM GRANULOCYTES NFR BLD AUTO: 1.6 % (ref 0–0.5)
LYMPHOCYTES # BLD AUTO: 1.26 10*3/MM3 (ref 0.7–3.1)
LYMPHOCYTES NFR BLD AUTO: 23 % (ref 19.6–45.3)
MAGNESIUM SERPL-MCNC: 1.7 MG/DL (ref 1.6–2.4)
MCH RBC QN AUTO: 28.5 PG (ref 26.6–33)
MCHC RBC AUTO-ENTMCNC: 32.7 G/DL (ref 31.5–35.7)
MCV RBC AUTO: 87.1 FL (ref 79–97)
MONOCYTES # BLD AUTO: 0.77 10*3/MM3 (ref 0.1–0.9)
MONOCYTES NFR BLD AUTO: 14.1 % (ref 5–12)
NEUTROPHILS NFR BLD AUTO: 2.97 10*3/MM3 (ref 1.7–7)
NEUTROPHILS NFR BLD AUTO: 54.3 % (ref 42.7–76)
NRBC BLD AUTO-RTO: 0 /100 WBC (ref 0–0.2)
OSMOLALITY UR: 142 MOSM/KG
PLATELET # BLD AUTO: 318 10*3/MM3 (ref 140–450)
PMV BLD AUTO: 8.8 FL (ref 6–12)
POTASSIUM SERPL-SCNC: 3.8 MMOL/L (ref 3.5–5.2)
PROT SERPL-MCNC: 5.1 G/DL (ref 6–8.5)
RBC # BLD AUTO: 3.89 10*6/MM3 (ref 3.77–5.28)
SODIUM SERPL-SCNC: 134 MMOL/L (ref 136–145)
WBC NRBC COR # BLD AUTO: 5.47 10*3/MM3 (ref 3.4–10.8)

## 2023-12-02 PROCEDURE — 25510000001 IOPAMIDOL PER 1 ML: Performed by: INTERNAL MEDICINE

## 2023-12-02 PROCEDURE — 88312 SPECIAL STAINS GROUP 1: CPT | Performed by: INTERNAL MEDICINE

## 2023-12-02 PROCEDURE — 25810000003 LACTATED RINGERS PER 1000 ML: Performed by: ANESTHESIOLOGY

## 2023-12-02 PROCEDURE — 43239 EGD BIOPSY SINGLE/MULTIPLE: CPT | Performed by: INTERNAL MEDICINE

## 2023-12-02 PROCEDURE — 63710000001 DIPHENHYDRAMINE PER 50 MG: Performed by: INTERNAL MEDICINE

## 2023-12-02 PROCEDURE — 74177 CT ABD & PELVIS W/CONTRAST: CPT

## 2023-12-02 PROCEDURE — 25010000002 ONDANSETRON PER 1 MG: Performed by: INTERNAL MEDICINE

## 2023-12-02 PROCEDURE — 88305 TISSUE EXAM BY PATHOLOGIST: CPT | Performed by: INTERNAL MEDICINE

## 2023-12-02 PROCEDURE — 0DB68ZX EXCISION OF STOMACH, VIA NATURAL OR ARTIFICIAL OPENING ENDOSCOPIC, DIAGNOSTIC: ICD-10-PCS | Performed by: INTERNAL MEDICINE

## 2023-12-02 PROCEDURE — 0DB58ZX EXCISION OF ESOPHAGUS, VIA NATURAL OR ARTIFICIAL OPENING ENDOSCOPIC, DIAGNOSTIC: ICD-10-PCS | Performed by: INTERNAL MEDICINE

## 2023-12-02 PROCEDURE — 25010000002 PROPOFOL 10 MG/ML EMULSION: Performed by: ANESTHESIOLOGY

## 2023-12-02 PROCEDURE — 94799 UNLISTED PULMONARY SVC/PX: CPT

## 2023-12-02 PROCEDURE — 94761 N-INVAS EAR/PLS OXIMETRY MLT: CPT

## 2023-12-02 PROCEDURE — 0 IOHEXOL 12 MG/ML SOLUTION: Performed by: INTERNAL MEDICINE

## 2023-12-02 PROCEDURE — 83735 ASSAY OF MAGNESIUM: CPT | Performed by: INTERNAL MEDICINE

## 2023-12-02 PROCEDURE — 76705 ECHO EXAM OF ABDOMEN: CPT

## 2023-12-02 PROCEDURE — 80053 COMPREHEN METABOLIC PANEL: CPT | Performed by: INTERNAL MEDICINE

## 2023-12-02 PROCEDURE — 85025 COMPLETE CBC W/AUTO DIFF WBC: CPT | Performed by: INTERNAL MEDICINE

## 2023-12-02 PROCEDURE — 94664 DEMO&/EVAL PT USE INHALER: CPT

## 2023-12-02 RX ORDER — SODIUM CHLORIDE, SODIUM LACTATE, POTASSIUM CHLORIDE, CALCIUM CHLORIDE 600; 310; 30; 20 MG/100ML; MG/100ML; MG/100ML; MG/100ML
INJECTION, SOLUTION INTRAVENOUS CONTINUOUS PRN
Status: DISCONTINUED | OUTPATIENT
Start: 2023-12-02 | End: 2023-12-02 | Stop reason: SURG

## 2023-12-02 RX ORDER — PANTOPRAZOLE SODIUM 40 MG/10ML
40 INJECTION, POWDER, LYOPHILIZED, FOR SOLUTION INTRAVENOUS EVERY 12 HOURS SCHEDULED
Status: DISCONTINUED | OUTPATIENT
Start: 2023-12-02 | End: 2023-12-04

## 2023-12-02 RX ORDER — LIDOCAINE HYDROCHLORIDE 20 MG/ML
INJECTION, SOLUTION EPIDURAL; INFILTRATION; INTRACAUDAL; PERINEURAL AS NEEDED
Status: DISCONTINUED | OUTPATIENT
Start: 2023-12-02 | End: 2023-12-02 | Stop reason: SURG

## 2023-12-02 RX ADMIN — HYDROCODONE BITARTRATE AND ACETAMINOPHEN 1 TABLET: 7.5; 325 TABLET ORAL at 15:59

## 2023-12-02 RX ADMIN — ONDANSETRON 4 MG: 2 INJECTION INTRAMUSCULAR; INTRAVENOUS at 10:05

## 2023-12-02 RX ADMIN — DIPHENHYDRAMINE HYDROCHLORIDE 25 MG: 25 CAPSULE ORAL at 22:42

## 2023-12-02 RX ADMIN — LIDOCAINE HYDROCHLORIDE 3 ML: 20 INJECTION, SOLUTION EPIDURAL; INFILTRATION; INTRACAUDAL; PERINEURAL at 09:13

## 2023-12-02 RX ADMIN — IPRATROPIUM BROMIDE AND ALBUTEROL SULFATE 3 ML: .5; 3 SOLUTION RESPIRATORY (INHALATION) at 01:05

## 2023-12-02 RX ADMIN — IPRATROPIUM BROMIDE AND ALBUTEROL SULFATE 3 ML: .5; 3 SOLUTION RESPIRATORY (INHALATION) at 10:56

## 2023-12-02 RX ADMIN — PANTOPRAZOLE SODIUM 40 MG: 40 INJECTION, POWDER, FOR SOLUTION INTRAVENOUS at 10:05

## 2023-12-02 RX ADMIN — PROPOFOL 120 MCG/KG/MIN: 10 INJECTION, EMULSION INTRAVENOUS at 09:13

## 2023-12-02 RX ADMIN — METHOCARBAMOL 500 MG: 500 TABLET ORAL at 20:33

## 2023-12-02 RX ADMIN — SODIUM CHLORIDE, POTASSIUM CHLORIDE, SODIUM LACTATE AND CALCIUM CHLORIDE: 600; 310; 30; 20 INJECTION, SOLUTION INTRAVENOUS at 09:09

## 2023-12-02 RX ADMIN — Medication 10 ML: at 20:32

## 2023-12-02 RX ADMIN — HYDROCODONE BITARTRATE AND ACETAMINOPHEN 1 TABLET: 7.5; 325 TABLET ORAL at 22:42

## 2023-12-02 RX ADMIN — IOPAMIDOL 100 ML: 755 INJECTION, SOLUTION INTRAVENOUS at 17:50

## 2023-12-02 RX ADMIN — AMOXICILLIN AND CLAVULANATE POTASSIUM 1 TABLET: 875; 125 TABLET, FILM COATED ORAL at 20:33

## 2023-12-02 RX ADMIN — ONDANSETRON 4 MG: 2 INJECTION INTRAMUSCULAR; INTRAVENOUS at 03:02

## 2023-12-02 RX ADMIN — AMITRIPTYLINE HYDROCHLORIDE 50 MG: 50 TABLET, FILM COATED ORAL at 20:33

## 2023-12-02 RX ADMIN — ACETAMINOPHEN 650 MG: 325 TABLET ORAL at 03:02

## 2023-12-02 RX ADMIN — IPRATROPIUM BROMIDE AND ALBUTEROL SULFATE 3 ML: .5; 3 SOLUTION RESPIRATORY (INHALATION) at 18:10

## 2023-12-02 RX ADMIN — ONDANSETRON 4 MG: 2 INJECTION INTRAMUSCULAR; INTRAVENOUS at 19:45

## 2023-12-02 RX ADMIN — POTASSIUM CHLORIDE, DEXTROSE MONOHYDRATE AND SODIUM CHLORIDE 75 ML/HR: 150; 5; 900 INJECTION, SOLUTION INTRAVENOUS at 20:39

## 2023-12-02 RX ADMIN — IOHEXOL 500 ML: 12 SOLUTION ORAL at 10:30

## 2023-12-02 RX ADMIN — PANTOPRAZOLE SODIUM 40 MG: 40 INJECTION, POWDER, FOR SOLUTION INTRAVENOUS at 20:32

## 2023-12-02 NOTE — ANESTHESIA POSTPROCEDURE EVALUATION
Patient: Lona Rodríguez    Procedure Summary       Date: 12/02/23 Room / Location: [facility] ENDOSCOPY 3 / [facility]    Anesthesia Start: 0913 Anesthesia Stop: 0929    Procedure: ESOPHAGOGASTRODUODENOSCOPY WITH BIOPSIES Diagnosis:       Bilious vomiting with nausea      (Bilious vomiting with nausea [R11.14])    Surgeons: Maximino Aceves MD Provider: David Márquez MD    Anesthesia Type: general ASA Status: 3            Anesthesia Type: general    Vitals  No vitals data found for the desired time range.          Post Anesthesia Care and Evaluation    Patient location during evaluation: bedside  Patient participation: complete - patient participated  Level of consciousness: awake and alert  Pain management: adequate    Airway patency: patent  Anesthetic complications: No anesthetic complications  PONV Status: none  Cardiovascular status: acceptable  Respiratory status: acceptable  Hydration status: acceptable    Comments: An Anesthesiologist personally participated in the most demanding procedures (including induction and emergence if applicable) in the anesthesia plan, monitored the course of anesthesia administration at frequent intervals and remained physically present and available for immediate diagnosis and treatment of emergencies.

## 2023-12-02 NOTE — PLAN OF CARE
Goal Outcome Evaluation:              Outcome Evaluation: Pt vss. pt resting well. EGD completed today. Pt medicated per mar for nausea. Pt NPO currently for CT scan with oral contrast, will be back on diabetic diet once completed. Continue plan of care.

## 2023-12-02 NOTE — PLAN OF CARE
Goal Outcome Evaluation:  Plan of Care Reviewed With: patient                  VSS throughout shift. Pain medication given x2. Continuous fluids at 75 ml/hr. Pt will be NPO at 0000 for US gallbladder & EGD. Zofran administered x1- no emesis today, only nausea. Urinalysis sent to lab and pending results. Will cont POC

## 2023-12-02 NOTE — H&P
Pre Procedure History & Physical    Chief Complaint:   Nausea and vomiting.  Weight loss    Subjective     HPI:   73-year-old lady with 2-year history of anorexia but 2-month history of worsening nausea and vomiting and significant weight loss.  With upper abdominal discomfort.    Past Medical History:   Past Medical History:   Diagnosis Date    AMD (age related macular degeneration)     Anemia Following surgery    Arthritis     Asthma     Breast cancer Left breast.    Breast mass 2 lumpectomies 1 cancerous 1 benign    CHF (congestive heart failure) Just getting evaluation    Colon polyp Removed during colonos    COPD (chronic obstructive pulmonary disease)     Coronary artery disease Chf    Deep vein thrombosis 1979    Fibrocystic breast Left breast    Fibromyalgia     History of transfusion Aug 2019 2 units    Hyperlipidemia     Hypertension     Nausea and vomiting 11/29/2023    Obesity     Polymyalgia arteritica     Postoperative wound infection Lymph node resection site,5th toe left foot infection after neuroma surgery. 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    Sciatica        Past Surgical History:  Past Surgical History:   Procedure Laterality Date    BREAST BIOPSY  Left    BREAST SURGERY  Lumpectomies left breast    EYE SURGERY  Lens implants both eyes    REPLACEMENT TOTAL KNEE      TONSILLECTOMY         Family History:  Family History   Problem Relation Age of Onset    Arthritis Mother     Cancer Mother     Heart disease Mother     Arthritis Father     COPD Father     Arthritis Brother     Diabetes Brother     Learning disabilities Brother         Dyslexic    Mental illness Maternal Aunt         Schizophrenic       Social History:   reports that she has never smoked. She has never used smokeless tobacco. She reports that she does not drink alcohol and does not use drugs.    Medications:   Medications Prior to Admission   Medication Sig Dispense Refill Last Dose    acetaminophen (Tylenol) 325 MG tablet Take 2 tablets by  "mouth Every 6 (Six) Hours As Needed for Mild Pain.       HYDROcodone-acetaminophen (NORCO) 7.5-325 MG per tablet Take 1 tablet by mouth Every 6 (Six) Hours.   11/27/2023    levothyroxine (SYNTHROID, LEVOTHROID) 75 MCG tablet Take 1 tablet by mouth Daily.   11/27/2023    meclizine (ANTIVERT) 25 MG tablet Take 1 tablet by mouth 3 (Three) Times a Day As Needed for Dizziness.       methocarbamol (ROBAXIN) 500 MG tablet Take 1 tablet by mouth Every 12 (Twelve) Hours.       metoclopramide (REGLAN) 5 MG tablet Take 1 tablet by mouth 4 (Four) Times a Day.       montelukast (SINGULAIR) 10 MG tablet Take 1 tablet by mouth Every Night.       omeprazole (priLOSEC) 40 MG capsule Take 1 capsule by mouth Daily.       vitamin D (ERGOCALCIFEROL) 1.25 MG (41094 UT) capsule capsule Take 1 capsule by mouth 2 (Two) Times a Week. Monday and Wednesday 11/27/2023    amitriptyline (ELAVIL) 50 MG tablet Take 1 tablet by mouth Every Night.   11/27/2023    atorvastatin (LIPITOR) 20 MG tablet Take 1 tablet by mouth Daily.   11/27/2023       Allergies:  Morphine        Objective     Blood pressure 121/65, pulse 85, temperature 97.5 °F (36.4 °C), temperature source Temporal, resp. rate 18, height 167.6 cm (66\"), weight 93.9 kg (207 lb 0.2 oz), SpO2 98%.    Physical Exam   Constitutional: Pt is oriented to person, place, and time and well-developed, well-nourished, and in no distress.   Mouth/Throat: Oropharynx is clear and moist.   Neck: Normal range of motion.   Cardiovascular: Normal rate, regular rhythm and normal heart sounds.  3 x 6 systolic murmur  Pulmonary/Chest: Effort normal and breath sounds normal.   Abdominal: Soft. Nontender  Skin: Skin is warm and dry.   Psychiatric: Mood, memory, affect and judgment normal.     Assessment & Plan     Diagnosis:  Nausea, vomiting and weight loss    Anticipated Surgical Procedure:  EGD    The risks, benefits, and alternatives of this procedure have been discussed with the patient or the responsible " party- the patient understands and agrees to proceed.

## 2023-12-02 NOTE — ANESTHESIA PREPROCEDURE EVALUATION
Anesthesia Evaluation     Patient summary reviewed and Nursing notes reviewed   no history of anesthetic complications:   NPO Solid Status: > 8 hours  NPO Liquid Status: > 2 hours           Airway   Mallampati: II  TM distance: >3 FB  Neck ROM: full  No difficulty expected  Dental    (+) poor dentition        Pulmonary - negative pulmonary ROS and normal exam    breath sounds clear to auscultation  (+) COPD mild, asthma,shortness of breath  Cardiovascular - negative cardio ROS and normal exam  Exercise tolerance: good (4-7 METS)    Rhythm: regular  Rate: normal    (+) hypertension, CAD, CHF , DVT, hyperlipidemia      Neuro/Psych- negative ROS  (+) dizziness/light headedness, numbness  GI/Hepatic/Renal/Endo - negative ROS     Musculoskeletal     Abdominal    Substance History - negative use     OB/GYN negative ob/gyn ROS         Other - negative ROS  arthritis,   history of cancer    ROS/Med Hx Other: PAT Nursing Notes unavailable.               Anesthesia Plan    ASA 3     general     (Total IV Anesthesia    Patient understands anesthesia not responsible for dental damage.  )  intravenous induction     Anesthetic plan, risks, benefits, and alternatives have been provided, discussed and informed consent has been obtained with: patient.    Plan discussed with CRNA.    CODE STATUS:    Code Status (Patient has no pulse and is not breathing): CPR (Attempt to Resuscitate)  Medical Interventions (Patient has pulse or is breathing): Full Support

## 2023-12-02 NOTE — PLAN OF CARE
Problem: Adult Inpatient Plan of Care  Goal: Plan of Care Review  Outcome: Ongoing, Progressing  Flowsheets (Taken 12/2/2023 0625)  Progress: no change  Plan of Care Reviewed With: patient  Goal: Patient-Specific Goal (Individualized)  Outcome: Ongoing, Progressing  Goal: Absence of Hospital-Acquired Illness or Injury  Outcome: Ongoing, Progressing  Intervention: Identify and Manage Fall Risk  Recent Flowsheet Documentation  Taken 12/2/2023 0517 by Nataliia Lawler LPN  Safety Promotion/Fall Prevention: safety round/check completed  Taken 12/2/2023 0305 by Nataliia Lawler LPN  Safety Promotion/Fall Prevention: safety round/check completed  Taken 12/2/2023 0105 by Nataliia Lawler LPN  Safety Promotion/Fall Prevention: safety round/check completed  Taken 12/1/2023 2306 by Nataliia Lawler LPN  Safety Promotion/Fall Prevention: safety round/check completed  Taken 12/1/2023 2102 by Nataliia Lawler LPN  Safety Promotion/Fall Prevention: safety round/check completed  Taken 12/1/2023 1947 by Nataliia Lawler LPN  Safety Promotion/Fall Prevention: safety round/check completed  Intervention: Prevent Skin Injury  Recent Flowsheet Documentation  Taken 12/1/2023 2306 by Nataliia Lawler LPN  Skin Protection: adhesive use limited  Intervention: Prevent and Manage VTE (Venous Thromboembolism) Risk  Recent Flowsheet Documentation  Taken 12/1/2023 2306 by Nataliia Lawler LPN  Range of Motion: ROM (range of motion) performed  Intervention: Prevent Infection  Recent Flowsheet Documentation  Taken 12/1/2023 2306 by Nataliia Lawler LPN  Infection Prevention:   equipment surfaces disinfected   hand hygiene promoted  Goal: Optimal Comfort and Wellbeing  Outcome: Ongoing, Progressing  Intervention: Monitor Pain and Promote Comfort  Recent Flowsheet Documentation  Taken 12/1/2023 2036 by Nataliia Lawler LPN  Pain Management Interventions: see MAR  Intervention: Provide Person-Centered Care  Recent Flowsheet Documentation  Taken 12/1/2023  2306 by Nataliia Lawler LPN  Trust Relationship/Rapport:   care explained   choices provided   questions answered   reassurance provided   thoughts/feelings acknowledged  Goal: Readiness for Transition of Care  Outcome: Ongoing, Progressing   Goal Outcome Evaluation:  Plan of Care Reviewed With: patient        Progress: no change     Vitals stable. Multiple complaints of headache treated per eMAR. NPO for EGD today. Will continue to monitor progress.

## 2023-12-02 NOTE — PROGRESS NOTES
[facility]     Progress Note    Patient Name: Lona Rodríguez  : 1950  MRN: [id number]  Primary Care Physician:  Christopher Thao MD  Date of admission: 2023    Subjective   Subjective     Chief Complaint: Known to have history of breast cancer has anemia getting a GI work-up ultrasound of the gallbladder results are still pending    HPI: Mated with nausea and vomiting getting a GI    Review of Systems   All systems were reviewed and negative except for: Has been reviewed    Objective   Objective     Vitals:   Temp:  [97.4 °F (36.3 °C)-97.9 °F (36.6 °C)] 97.4 °F (36.3 °C)  Heart Rate:  [85-94] 91  Resp:  [18-20] 18  BP: ()/() 119/62  Flow (L/min):  [4] 4    Physical Exam    Constitutional: Awake, alert   Eyes: PERRLA, sclerae anicteric, no conjunctival injection   HENT: NCAT, mucous membranes moist   Neck: Supple, no thyromegaly, no lymphadenopathy, trachea midline   Respiratory: Clear to auscultation bilaterally, nonlabored respirations    Cardiovascular: RRR, no murmurs, rubs, or gallops, palpable pedal pulses bilaterally   Gastrointestinal: Positive bowel sounds, soft, nontender, nondistended   Musculoskeletal: No bilateral ankle edema, no clubbing or cyanosis to extremities   Psychiatric: Appropriate affect, cooperative   Neurologic: Oriented x 3, strength symmetric in all extremities, Cranial Nerves grossly intact to confrontation, speech clear   Skin: No rashes   No change  Result Review    Result Review:  I have personally reviewed the results from the time of this admission to 2023 10:03 EST and agree with these findings:  [x]  Laboratory  []  Microbiology  []  Radiology  []  EKG/Telemetry   []  Cardiology/Vascular   []  Pathology  []  Old records  []  Other:  Most notable findings include: No change continue current managed    Assessment & Plan   Assessment / Plan     Brief Patient Summary:  Lona Rodríguez is a 73 y.o. female who with nausea vomiting abdominal pain and  anemia    Active Hospital Problems:  Active Hospital Problems    Diagnosis     **Hypokalemia     Dizziness     Nausea and vomiting        Plan:   EGD today    DVT prophylaxis:  Medical DVT prophylaxis orders are present.    CODE STATUS:   Code Status (Patient has no pulse and is not breathing): CPR (Attempt to Resuscitate)  Medical Interventions (Patient has pulse or is breathing): Full Support    Disposition:  I expect patient to be discharged after the patient has been stabilized.    Electronically signed by Hai Sommer MD, 12/02/23, 10:03 AM EST.      Part of this note may be an electronic transcription/translation of spoken language to printed text using the Dragon Dictation System.

## 2023-12-02 NOTE — PROGRESS NOTES
EGD completed.  Full report in the chart    Severe erosive distal esophagitis.  Biopsies obtained  Large hiatal hernia  Diffuse nonerosive gastritis  Normal duodenum.    CT abdomen pelvis ordered.  Await biopsy results.  Start PPI.  Advance diet as tolerated.    Maximino Aceves. MD

## 2023-12-03 PROCEDURE — 25010000002 ENOXAPARIN PER 10 MG: Performed by: INTERNAL MEDICINE

## 2023-12-03 PROCEDURE — 94761 N-INVAS EAR/PLS OXIMETRY MLT: CPT

## 2023-12-03 PROCEDURE — 99232 SBSQ HOSP IP/OBS MODERATE 35: CPT | Performed by: INTERNAL MEDICINE

## 2023-12-03 PROCEDURE — 63710000001 DIPHENHYDRAMINE PER 50 MG: Performed by: INTERNAL MEDICINE

## 2023-12-03 PROCEDURE — 94664 DEMO&/EVAL PT USE INHALER: CPT

## 2023-12-03 PROCEDURE — 94799 UNLISTED PULMONARY SVC/PX: CPT

## 2023-12-03 PROCEDURE — 25010000002 ONDANSETRON PER 1 MG: Performed by: INTERNAL MEDICINE

## 2023-12-03 RX ORDER — SUCRALFATE 1 G/1
2 TABLET ORAL
Status: DISCONTINUED | OUTPATIENT
Start: 2023-12-03 | End: 2023-12-03

## 2023-12-03 RX ORDER — SUCRALFATE ORAL 1 G/10ML
1 SUSPENSION ORAL
Status: DISCONTINUED | OUTPATIENT
Start: 2023-12-03 | End: 2023-12-03

## 2023-12-03 RX ORDER — SUCRALFATE 1 G/1
1 TABLET ORAL
Status: DISCONTINUED | OUTPATIENT
Start: 2023-12-03 | End: 2023-12-05 | Stop reason: HOSPADM

## 2023-12-03 RX ORDER — SUCRALFATE 1 G/1
1 TABLET ORAL
Status: DISCONTINUED | OUTPATIENT
Start: 2023-12-03 | End: 2023-12-03

## 2023-12-03 RX ADMIN — METHOCARBAMOL 500 MG: 500 TABLET ORAL at 08:20

## 2023-12-03 RX ADMIN — IPRATROPIUM BROMIDE AND ALBUTEROL SULFATE 3 ML: .5; 3 SOLUTION RESPIRATORY (INHALATION) at 18:38

## 2023-12-03 RX ADMIN — IPRATROPIUM BROMIDE AND ALBUTEROL SULFATE 3 ML: .5; 3 SOLUTION RESPIRATORY (INHALATION) at 13:30

## 2023-12-03 RX ADMIN — HYDROCODONE BITARTRATE AND ACETAMINOPHEN 1 TABLET: 7.5; 325 TABLET ORAL at 04:42

## 2023-12-03 RX ADMIN — HYDROCODONE BITARTRATE AND ACETAMINOPHEN 1 TABLET: 7.5; 325 TABLET ORAL at 10:49

## 2023-12-03 RX ADMIN — ONDANSETRON 4 MG: 2 INJECTION INTRAMUSCULAR; INTRAVENOUS at 19:12

## 2023-12-03 RX ADMIN — IPRATROPIUM BROMIDE AND ALBUTEROL SULFATE 3 ML: .5; 3 SOLUTION RESPIRATORY (INHALATION) at 07:08

## 2023-12-03 RX ADMIN — LEVOTHYROXINE SODIUM 75 MCG: 75 TABLET ORAL at 05:51

## 2023-12-03 RX ADMIN — Medication 10 ML: at 08:20

## 2023-12-03 RX ADMIN — PANTOPRAZOLE SODIUM 40 MG: 40 INJECTION, POWDER, FOR SOLUTION INTRAVENOUS at 08:20

## 2023-12-03 RX ADMIN — HYDROCODONE BITARTRATE AND ACETAMINOPHEN 1 TABLET: 7.5; 325 TABLET ORAL at 21:43

## 2023-12-03 RX ADMIN — AMOXICILLIN AND CLAVULANATE POTASSIUM 1 TABLET: 875; 125 TABLET, FILM COATED ORAL at 08:20

## 2023-12-03 RX ADMIN — PANTOPRAZOLE SODIUM 40 MG: 40 INJECTION, POWDER, FOR SOLUTION INTRAVENOUS at 21:43

## 2023-12-03 RX ADMIN — SUCRALFATE 1 G: 1 TABLET ORAL at 21:43

## 2023-12-03 RX ADMIN — METHOCARBAMOL 500 MG: 500 TABLET ORAL at 21:43

## 2023-12-03 RX ADMIN — ENOXAPARIN SODIUM 40 MG: 100 INJECTION SUBCUTANEOUS at 08:20

## 2023-12-03 RX ADMIN — AMITRIPTYLINE HYDROCHLORIDE 50 MG: 50 TABLET, FILM COATED ORAL at 21:43

## 2023-12-03 RX ADMIN — SUCRALFATE 1 G: 1 TABLET ORAL at 16:59

## 2023-12-03 RX ADMIN — ACETAMINOPHEN 650 MG: 325 TABLET ORAL at 00:31

## 2023-12-03 RX ADMIN — DIPHENHYDRAMINE HYDROCHLORIDE 25 MG: 25 CAPSULE ORAL at 22:20

## 2023-12-03 RX ADMIN — Medication 10 ML: at 21:44

## 2023-12-03 RX ADMIN — ONDANSETRON 4 MG: 2 INJECTION INTRAMUSCULAR; INTRAVENOUS at 11:11

## 2023-12-03 RX ADMIN — SUCRALFATE 2 G: 1 TABLET ORAL at 10:49

## 2023-12-03 RX ADMIN — POTASSIUM CHLORIDE, DEXTROSE MONOHYDRATE AND SODIUM CHLORIDE 75 ML/HR: 150; 5; 900 INJECTION, SOLUTION INTRAVENOUS at 22:05

## 2023-12-03 NOTE — PLAN OF CARE
Problem: Adult Inpatient Plan of Care  Goal: Plan of Care Review  Outcome: Ongoing, Progressing  Flowsheets (Taken 12/3/2023 0629)  Progress: no change  Plan of Care Reviewed With: patient  Goal: Patient-Specific Goal (Individualized)  Outcome: Ongoing, Progressing  Goal: Absence of Hospital-Acquired Illness or Injury  Outcome: Ongoing, Progressing  Intervention: Identify and Manage Fall Risk  Recent Flowsheet Documentation  Taken 12/3/2023 0511 by Nataliia Lawler LPN  Safety Promotion/Fall Prevention: safety round/check completed  Taken 12/3/2023 0320 by Nataliia Lawler LPN  Safety Promotion/Fall Prevention: safety round/check completed  Taken 12/3/2023 0131 by Nataliia Lawler LPN  Safety Promotion/Fall Prevention: safety round/check completed  Taken 12/2/2023 2321 by Nataliia Lawler LPN  Safety Promotion/Fall Prevention: safety round/check completed  Taken 12/2/2023 2114 by Nataliia Lawler LPN  Safety Promotion/Fall Prevention: safety round/check completed  Taken 12/2/2023 1941 by Nataliia Lawler LPN  Safety Promotion/Fall Prevention: safety round/check completed  Intervention: Prevent and Manage VTE (Venous Thromboembolism) Risk  Recent Flowsheet Documentation  Taken 12/3/2023 0000 by Nataliia Lawler LPN  VTE Prevention/Management: (Lovenox) other (see comments)  Range of Motion: active ROM (range of motion) encouraged  Intervention: Prevent Infection  Recent Flowsheet Documentation  Taken 12/3/2023 0000 by Nataliia Lawler LPN  Infection Prevention:   equipment surfaces disinfected   hand hygiene promoted  Goal: Optimal Comfort and Wellbeing  Outcome: Ongoing, Progressing  Intervention: Monitor Pain and Promote Comfort  Recent Flowsheet Documentation  Taken 12/3/2023 0031 by Nataliia Lawler LPN  Pain Management Interventions: see MAR  Taken 12/3/2023 0000 by Nataliia Lawler LPN  Pain Management Interventions: see MAR  Intervention: Provide Person-Centered Care  Recent Flowsheet Documentation  Taken 12/3/2023  0000 by Nataliia Lawler LPN  Trust Relationship/Rapport:   care explained   choices provided   questions answered   reassurance provided   thoughts/feelings acknowledged  Goal: Readiness for Transition of Care  Outcome: Ongoing, Progressing   Goal Outcome Evaluation:  Plan of Care Reviewed With: patient        Progress: no change     Vitals stable. Nausea slightly improved this shift. Multiple complaints of pain treated per eMAR. Will continue to monitor progress.

## 2023-12-03 NOTE — PLAN OF CARE
Goal Outcome Evaluation:              Outcome Evaluation: Pt vss. pt resting well. pt medicated for pain and nausea prn per mar. Pt on diabetic diet. No other complaints. Continue plan of care.

## 2023-12-03 NOTE — PROGRESS NOTES
[facility] Gastroenterology  Inpatient Progress Note    Lona Rodríguez  1950    12/3/2023    Subjective   Subjective     HPI:  Lona Rodríguez is a 73 y.o. female admitted on 11/29/2023 for chief complaint of nausea and vomiting and generalized weakness.  She has had almost 2-year history of poor appetite with worsening symptoms the last few months.  Also significant amount of weight loss.  Previous history of gastroesophageal reflux.    Patient has significant electrolyte abnormalities on admission which have beenHistory of breast cancer more than 10 years ago.    EGD yesterday showed severe erosive distal esophagitis, large hiatal hernia, diffuse nonerosive gastritis and normal duodenum.    Biopsies are pending.  She is on Protonix 40 mg twice daily and Carafate.  She is unable to take the last Carafate pills.    CT abdomen pelvis done was fairly unremarkable except for right ovarian cystic lesion and hiatal hernia and colonic and duodenal diverticulosis.    Today she was doing quite well until this afternoon when she was having some discomfort epigastric area.  There has been no vomiting.    Review of Systems     All systems were reviewed and negative except for: No chest pain or shortness of breath.      Objective    Objective     Current Medications  Scheduled Meds:amitriptyline, 50 mg, Oral, Nightly  [Held by provider] amoxicillin-clavulanate, 1 tablet, Oral, Q12H  enoxaparin, 40 mg, Subcutaneous, Daily  ipratropium-albuterol, 3 mL, Nebulization, Q6H - RT  levothyroxine, 75 mcg, Oral, Q AM  methocarbamol, 500 mg, Oral, Q12H  pantoprazole, 40 mg, Intravenous, Q12H  senna-docusate sodium, 2 tablet, Oral, Nightly  sodium chloride, 10 mL, Intravenous, Q12H  sucralfate, 1 g, Oral, 4x Daily AC & at Bedtime      Continuous Infusions:dextrose 5 % and sodium chloride 0.9 % with KCl 20 mEq, 75 mL/hr, Last Rate: 75 mL/hr (12/02/23 2039)  sodium chloride 0.9 % with KCl 20 mEq, 50 mL/hr, Last Rate: 50 mL/hr  (12/01/23 0434)      PRN Meds:.  acetaminophen **OR** acetaminophen **OR** acetaminophen    senna-docusate sodium **AND** polyethylene glycol **AND** bisacodyl **AND** bisacodyl    diphenhydrAMINE    HYDROcodone-acetaminophen    nitroglycerin    ondansetron    sodium chloride    sodium chloride    sodium chloride     Vitals:  Temp:  [97.4 °F (36.3 °C)-98.7 °F (37.1 °C)] 97.7 °F (36.5 °C)  Heart Rate:  [] 86  Resp:  [16-18] 18  BP: ()/(42-71) 106/59    Physical Exam:   Constitutional: Awake, alert   Eyes: PERRLA, sclerae anicteric, no conjunctival injection   HENT: NCAT, mucous membranes moist   Neck: Supple, no thyromegaly, no lymphadenopathy, trachea midline   Respiratory: Clear to auscultation bilaterally, nonlabored respirations    Cardiovascular: RRR, no murmurs, rubs, or gallops, palpable pedal pulses bilaterally   Gastrointestinal: Positive bowel sounds, soft, nontender, nondistended   Musculoskeletal: No bilateral ankle edema, no clubbing or cyanosis to extremities   Psychiatric: Appropriate affect, cooperative   Neurologic: Oriented x 3, strength symmetric in all extremities, Cranial Nerves grossly intact to confrontation, speech clear   Skin: No rashes        Results Review:    I have reviewed all of the patients current test results    Laboratory:    Lab Results (last 24 hours)       ** No results found for the last 24 hours. **             Radiology:    Imaging Results (Last 24 Hours)       Procedure Component Value Units Date/Time    CT Abdomen Pelvis With Contrast [[id number]] Collected: 12/02/23 1809     Updated: 12/02/23 1812    Narrative:      PROCEDURE: CT ABDOMEN PELVIS W CONTRAST     COMPARISON: None     INDICATIONS: Patient with history of breast cancer.  With nausea and vomiting and weight loss and   diffuse upper abdominal pain.     TECHNIQUE: After obtaining the patient's consent, CT images were created with non-ionic intravenous   contrast material.       PROTOCOL:   Standard  imaging protocol performed      RADIATION:   DLP: 1523.2mGy*cm    Automated exposure control was utilized to minimize radiation dose.   CONTRAST: 92cc Isovue 370 I.V.  LABS:   eGFR: >60ml/min/1.73m2     FINDINGS:   Lower chest:  Calcific pleural thickening on the left, likely postinflammatory or related to   previous hemothorax.  Rounded atelectasis in the left lower lobe.  Moderate hiatal hernia     Organs:  Cysts or hemangiomas in the spleen.  No focal liver lesion.  Normal bilateral adrenal   morphology.  Unremarkable kidneys with no hydronephrosis.  Pancreas, gallbladder unremarkable     GI tract:  Moderate hiatal hernia.  Diverticula of the duodenum and colon with no associated   inflammation.  Normal appendix.  No mesenteric adenopathy     Pelvis:  Multilocular cystic lesion of the right ovary measuring up to approximately 6 centimeter.    Left ovary and uterus within normal limits.  Urinary bladder unremarkable     Peritoneum/retroperitoneum:  No ascites or pneumoperitoneum.  No retroperitoneal adenopathy.    Normal caliber aorta     Bones/soft tissues:  No acute or suspicious bone lesion       Impression:            1. No acute process demonstrated     2. Moderate hiatal hernia     3. Colonic and duodenal diverticulosis     4. Cystic right ovarian lesion.  Recommend initial characterization with ultrasound             MICHELLE MAGALLON MD         Electronically Signed and Approved By: MICHELLE MAGALLON MD on 12/02/2023 at 18:09                               Assessment & Plan       #1 nausea and vomiting     Patient has had anorexia for almost 2 years but worsening symptoms of nausea and vomiting for the past 2 months.  Significant weight loss is associated with that.  Possible etiologies include gastric outlet obstruction or upper GI malignancy or peptic ulcer disease.     #2 gastroesophageal reflux disease     Patient was on PPI at home.  Remote history of EGD.  No records available     #3 weight loss     With  her history of breast cancer recurrent breast cancer is a remote possibility.  PET scan however has been negative in September 2023     #4 hypokalemia and hyponatremia    Recommendations    #1 continue Protonix 40 mg twice daily.  Can change to oral  #2 change Carafate to suspension only 1 g 4 times a day  #3 follow-up on biopsy results  #4 further evaluation of ovarian lesion as per primary team.        Maximino Aceves MD  12/03/23  14:11 EST      Electronically signed by Maximino Aceves MD, 12/03/23, 2:11 PM EST.

## 2023-12-03 NOTE — PROGRESS NOTES
[facility]     Progress Note    Patient Name: Lona Rodríguez  : 1950  MRN: [id number]  Primary Care Physician:  Christopher Thao MD  Date of admission: 2023    Subjective   Subjective     Chief Complaint: Complaining of nausea but whole lot better since pantoprazole started concerned that her antibiotics may be may be making her sick so we will stop the Augmentin and add Carafate to the treatment regimen    HPI: Stable and doing well not in acute distress explained about the EGD finding    Review of Systems   All systems were reviewed and negative except for: As been reviewed    Objective   Objective     Vitals:   Temp:  [97.4 °F (36.3 °C)-98.7 °F (37.1 °C)] 97.7 °F (36.5 °C)  Heart Rate:  [81-98] 86  Resp:  [16-18] 18  BP: ()/(42-71) 105/49    Physical Exam    Constitutional: Awake, alert   Eyes: PERRLA, sclerae anicteric, no conjunctival injection   HENT: NCAT, mucous membranes moist   Neck: Supple, no thyromegaly, no lymphadenopathy, trachea midline   Respiratory: Clear to auscultation bilaterally, nonlabored respirations    Cardiovascular: RRR, no murmurs, rubs, or gallops, palpable pedal pulses bilaterally   Gastrointestinal: Positive bowel sounds, soft, nontender, nondistended   Musculoskeletal: No bilateral ankle edema, no clubbing or cyanosis to extremities   Psychiatric: Appropriate affect, cooperative   Neurologic: Oriented x 3, strength symmetric in all extremities, Cranial Nerves grossly intact to confrontation, speech clear   Skin: No rashes   No change  Result Review    Result Review:  I have personally reviewed the results from the time of this admission to 12/3/2023 10:18 EST and agree with these findings:  Stable laboratory  []  Microbiology  [x]  Radiology  []  EKG/Telemetry   []  Cardiology/Vascular   []  Pathology  []  Old records  []  Other:  Most notable findings include: Reviewed and discussed    Assessment & Plan   Assessment / Plan     Brief Patient Summary:  Lona  Anne is a 73 y.o. female who CT scan findings were also explained to the patient we will start Augmentin    Active Hospital Problems:  Active Hospital Problems    Diagnosis     **Hypokalemia     Dizziness     Nausea and vomiting        Plan:   Nausea vomiting has markedly improved    DVT prophylaxis:  Medical DVT prophylaxis orders are present.    CODE STATUS:   Code Status (Patient has no pulse and is not breathing): CPR (Attempt to Resuscitate)  Medical Interventions (Patient has pulse or is breathing): Full Support    Disposition:  I expect patient to be discharged after the patient has been stabilized.    Electronically signed by Hai Sommer MD, 12/03/23, 10:18 AM EST.      Part of this note may be an electronic transcription/translation of spoken language to printed text using the Dragon Dictation System.

## 2023-12-04 PROCEDURE — 25010000002 ONDANSETRON PER 1 MG: Performed by: INTERNAL MEDICINE

## 2023-12-04 PROCEDURE — 25010000002 ENOXAPARIN PER 10 MG: Performed by: INTERNAL MEDICINE

## 2023-12-04 PROCEDURE — 63710000001 DIPHENHYDRAMINE PER 50 MG: Performed by: INTERNAL MEDICINE

## 2023-12-04 PROCEDURE — 94799 UNLISTED PULMONARY SVC/PX: CPT

## 2023-12-04 PROCEDURE — 94761 N-INVAS EAR/PLS OXIMETRY MLT: CPT

## 2023-12-04 PROCEDURE — 94664 DEMO&/EVAL PT USE INHALER: CPT

## 2023-12-04 RX ORDER — SPIRONOLACTONE 25 MG/1
25 TABLET ORAL DAILY
Status: DISCONTINUED | OUTPATIENT
Start: 2023-12-04 | End: 2023-12-05 | Stop reason: HOSPADM

## 2023-12-04 RX ORDER — ALBUTEROL SULFATE 2.5 MG/3ML
2.5 SOLUTION RESPIRATORY (INHALATION) EVERY 6 HOURS PRN
Status: DISCONTINUED | OUTPATIENT
Start: 2023-12-04 | End: 2023-12-05 | Stop reason: HOSPADM

## 2023-12-04 RX ORDER — PANTOPRAZOLE SODIUM 40 MG/1
40 TABLET, DELAYED RELEASE ORAL
Status: DISCONTINUED | OUTPATIENT
Start: 2023-12-04 | End: 2023-12-05 | Stop reason: HOSPADM

## 2023-12-04 RX ORDER — LIDOCAINE 4 G/G
1 PATCH TOPICAL
Status: COMPLETED | OUTPATIENT
Start: 2023-12-04 | End: 2023-12-04

## 2023-12-04 RX ADMIN — IPRATROPIUM BROMIDE AND ALBUTEROL SULFATE 3 ML: .5; 3 SOLUTION RESPIRATORY (INHALATION) at 11:39

## 2023-12-04 RX ADMIN — HYDROCODONE BITARTRATE AND ACETAMINOPHEN 1 TABLET: 7.5; 325 TABLET ORAL at 05:55

## 2023-12-04 RX ADMIN — SPIRONOLACTONE 25 MG: 25 TABLET ORAL at 11:06

## 2023-12-04 RX ADMIN — PANTOPRAZOLE SODIUM 40 MG: 40 INJECTION, POWDER, FOR SOLUTION INTRAVENOUS at 09:04

## 2023-12-04 RX ADMIN — HYDROCODONE BITARTRATE AND ACETAMINOPHEN 1 TABLET: 7.5; 325 TABLET ORAL at 13:44

## 2023-12-04 RX ADMIN — ONDANSETRON 4 MG: 2 INJECTION INTRAMUSCULAR; INTRAVENOUS at 22:39

## 2023-12-04 RX ADMIN — SUCRALFATE 1 G: 1 TABLET ORAL at 16:45

## 2023-12-04 RX ADMIN — HYDROCODONE BITARTRATE AND ACETAMINOPHEN 1 TABLET: 7.5; 325 TABLET ORAL at 22:39

## 2023-12-04 RX ADMIN — ONDANSETRON 4 MG: 2 INJECTION INTRAMUSCULAR; INTRAVENOUS at 16:45

## 2023-12-04 RX ADMIN — SUCRALFATE 1 G: 1 TABLET ORAL at 11:06

## 2023-12-04 RX ADMIN — ENOXAPARIN SODIUM 40 MG: 100 INJECTION SUBCUTANEOUS at 09:04

## 2023-12-04 RX ADMIN — METHOCARBAMOL 500 MG: 500 TABLET ORAL at 09:04

## 2023-12-04 RX ADMIN — SUCRALFATE 1 G: 1 TABLET ORAL at 22:38

## 2023-12-04 RX ADMIN — Medication 10 ML: at 22:39

## 2023-12-04 RX ADMIN — IPRATROPIUM BROMIDE AND ALBUTEROL SULFATE 3 ML: .5; 3 SOLUTION RESPIRATORY (INHALATION) at 00:48

## 2023-12-04 RX ADMIN — PANTOPRAZOLE SODIUM 40 MG: 40 TABLET, DELAYED RELEASE ORAL at 18:17

## 2023-12-04 RX ADMIN — SUCRALFATE 1 G: 1 TABLET ORAL at 08:00

## 2023-12-04 RX ADMIN — ONDANSETRON 4 MG: 2 INJECTION INTRAMUSCULAR; INTRAVENOUS at 09:08

## 2023-12-04 RX ADMIN — DIPHENHYDRAMINE HYDROCHLORIDE 25 MG: 25 CAPSULE ORAL at 22:38

## 2023-12-04 RX ADMIN — IPRATROPIUM BROMIDE AND ALBUTEROL SULFATE 3 ML: .5; 3 SOLUTION RESPIRATORY (INHALATION) at 06:42

## 2023-12-04 RX ADMIN — LEVOTHYROXINE SODIUM 75 MCG: 75 TABLET ORAL at 05:55

## 2023-12-04 RX ADMIN — ONDANSETRON 4 MG: 2 INJECTION INTRAMUSCULAR; INTRAVENOUS at 02:55

## 2023-12-04 RX ADMIN — LIDOCAINE 1 PATCH: 4 PATCH TOPICAL at 09:07

## 2023-12-04 RX ADMIN — AMITRIPTYLINE HYDROCHLORIDE 50 MG: 50 TABLET, FILM COATED ORAL at 22:38

## 2023-12-04 RX ADMIN — METHOCARBAMOL 500 MG: 500 TABLET ORAL at 22:37

## 2023-12-04 RX ADMIN — Medication 10 ML: at 09:08

## 2023-12-04 NOTE — PLAN OF CARE
Goal Outcome Evaluation:  Plan of Care Reviewed With: patient        Progress: no change  Outcome Evaluation: VSS. Medicated for back pain and nausea as ordered. No acute events noted overnight

## 2023-12-04 NOTE — PROGRESS NOTES
[facility]     Progress Note    Patient Name: Lona Rodríguez  : 1950  MRN: [id number]  Primary Care Physician:  Christopher Thao MD  Date of admission: 2023      Subjective   Brief summary.  Follow-up hypokalemia      HPI:  Patient seen earlier this morning, complains of many issues today complains of hurting all over, weak, still dizzy when she gets up.  Continues to complain of, leg swelling, fluid retention.      Review of Systems     No chest pain, no palpitations no shortness of breath  Dizziness and weakness,  Nausea  Bilateral leg swelling    Objective     Vitals:   Temp:  [97.7 °F (36.5 °C)-98 °F (36.7 °C)] 98 °F (36.7 °C)  Heart Rate:  [76-93] 76  Resp:  [16-20] 18  BP: (102-150)/(51-71) 150/71  Flow (L/min):  [0] 0    Physical Exam :     Elderly obese female not in distress, but anxious pallor noted.   Neck without any JVD  Heart regular.  Lungs clear, diminished breath sounds no crackles.  Abdomen obese and soft mild epigastric tenderness.  Extremities 1-2+ edema no calf tenderness      Result Review:  I have personally reviewed the results from the time of this admission to 2023 17:31 EST and agree with these findings:  [x]  Laboratory  []  Microbiology  []  Radiology  []  EKG/Telemetry   []  Cardiology/Vascular   []  Pathology  []  Old records  []  Other:         Reviewed    Assessment / Plan       Active Hospital Problems:  Active Hospital Problems    Diagnosis     **Hypokalemia     Dizziness     Nausea and vomiting        Plan:   Improving, switch to oral PPI, continue Carafate, add Aldactone, check labs in a.m., increase activity, PT OT inpatient rehab.  Possible discharge tomorrow    Check labs in a.m.       DVT prophylaxis:  Medical DVT prophylaxis orders are present.    CODE STATUS:   Code Status (Patient has no pulse and is not breathing): CPR (Attempt to Resuscitate)  Medical Interventions (Patient has pulse or is breathing): Full Support      Electronically signed by  Luiz Diana MD, 12/04/23, 5:32 PM EST.

## 2023-12-04 NOTE — PLAN OF CARE
Goal Outcome Evaluation:  Plan of Care Reviewed With: patient        Progress: improving  Outcome Evaluation: VSS throughot shift. Zofran given x1 for nausea, no emesis. Norco given x1, Lidocaine patch applied for low back pain. Fluids discontined as ordered. Cardiac monitoring discontined as ordered. Spironalactone added due to increased edema noted to BLE. Pt ambulated in room and to rest room. She has been pleasant. Will cont POC

## 2023-12-05 ENCOUNTER — READMISSION MANAGEMENT (OUTPATIENT)
Dept: CALL CENTER | Facility: HOSPITAL | Age: 73
End: 2023-12-05
Payer: MEDICARE

## 2023-12-05 VITALS
DIASTOLIC BLOOD PRESSURE: 67 MMHG | OXYGEN SATURATION: 97 % | HEIGHT: 66 IN | BODY MASS INDEX: 33.27 KG/M2 | TEMPERATURE: 97.9 F | HEART RATE: 86 BPM | RESPIRATION RATE: 18 BRPM | SYSTOLIC BLOOD PRESSURE: 114 MMHG | WEIGHT: 207.01 LBS

## 2023-12-05 PROBLEM — R11.2 NAUSEA AND VOMITING: Chronic | Status: RESOLVED | Noted: 2023-11-29 | Resolved: 2023-12-05

## 2023-12-05 PROBLEM — R42 DIZZINESS: Status: RESOLVED | Noted: 2023-11-29 | Resolved: 2023-12-05

## 2023-12-05 PROBLEM — E87.6 HYPOKALEMIA: Status: RESOLVED | Noted: 2023-11-28 | Resolved: 2023-12-05

## 2023-12-05 PROBLEM — K21.9 GERD (GASTROESOPHAGEAL REFLUX DISEASE): Chronic | Status: ACTIVE | Noted: 2023-12-05

## 2023-12-05 PROBLEM — R11.0 CHRONIC NAUSEA: Chronic | Status: ACTIVE | Noted: 2023-12-05

## 2023-12-05 PROBLEM — R60.0 PEDAL EDEMA: Chronic | Status: ACTIVE | Noted: 2023-12-05

## 2023-12-05 LAB
ALBUMIN SERPL-MCNC: 2.2 G/DL (ref 3.5–5.2)
ALBUMIN/GLOB SERPL: 1 G/DL
ALP SERPL-CCNC: 123 U/L (ref 39–117)
ALT SERPL W P-5'-P-CCNC: 15 U/L (ref 1–33)
ANION GAP SERPL CALCULATED.3IONS-SCNC: 9 MMOL/L (ref 5–15)
AST SERPL-CCNC: 29 U/L (ref 1–32)
BASOPHILS # BLD AUTO: 0.04 10*3/MM3 (ref 0–0.2)
BASOPHILS NFR BLD AUTO: 0.8 % (ref 0–1.5)
BILIRUB SERPL-MCNC: 0.4 MG/DL (ref 0–1.2)
BUN SERPL-MCNC: 3 MG/DL (ref 8–23)
BUN/CREAT SERPL: 5.9 (ref 7–25)
CALCIUM SPEC-SCNC: 8.2 MG/DL (ref 8.6–10.5)
CHLORIDE SERPL-SCNC: 105 MMOL/L (ref 98–107)
CO2 SERPL-SCNC: 23 MMOL/L (ref 22–29)
CREAT SERPL-MCNC: 0.51 MG/DL (ref 0.57–1)
DEPRECATED RDW RBC AUTO: 58.9 FL (ref 37–54)
EGFRCR SERPLBLD CKD-EPI 2021: 98.7 ML/MIN/1.73
EOSINOPHIL # BLD AUTO: 0.29 10*3/MM3 (ref 0–0.4)
EOSINOPHIL NFR BLD AUTO: 5.7 % (ref 0.3–6.2)
ERYTHROCYTE [DISTWIDTH] IN BLOOD BY AUTOMATED COUNT: 18.3 % (ref 12.3–15.4)
GLOBULIN UR ELPH-MCNC: 2.3 GM/DL
GLUCOSE SERPL-MCNC: 88 MG/DL (ref 65–99)
HCT VFR BLD AUTO: 30.7 % (ref 34–46.6)
HGB BLD-MCNC: 10.1 G/DL (ref 12–15.9)
IMM GRANULOCYTES # BLD AUTO: 0.08 10*3/MM3 (ref 0–0.05)
IMM GRANULOCYTES NFR BLD AUTO: 1.6 % (ref 0–0.5)
LYMPHOCYTES # BLD AUTO: 1.41 10*3/MM3 (ref 0.7–3.1)
LYMPHOCYTES NFR BLD AUTO: 27.5 % (ref 19.6–45.3)
MCH RBC QN AUTO: 29.2 PG (ref 26.6–33)
MCHC RBC AUTO-ENTMCNC: 32.9 G/DL (ref 31.5–35.7)
MCV RBC AUTO: 88.7 FL (ref 79–97)
MONOCYTES # BLD AUTO: 0.79 10*3/MM3 (ref 0.1–0.9)
MONOCYTES NFR BLD AUTO: 15.4 % (ref 5–12)
NEUTROPHILS NFR BLD AUTO: 2.52 10*3/MM3 (ref 1.7–7)
NEUTROPHILS NFR BLD AUTO: 49 % (ref 42.7–76)
NRBC BLD AUTO-RTO: 0 /100 WBC (ref 0–0.2)
PLATELET # BLD AUTO: 298 10*3/MM3 (ref 140–450)
PMV BLD AUTO: 9 FL (ref 6–12)
POTASSIUM SERPL-SCNC: 3.9 MMOL/L (ref 3.5–5.2)
PROT SERPL-MCNC: 4.5 G/DL (ref 6–8.5)
RBC # BLD AUTO: 3.46 10*6/MM3 (ref 3.77–5.28)
SODIUM SERPL-SCNC: 137 MMOL/L (ref 136–145)
WBC NRBC COR # BLD AUTO: 5.13 10*3/MM3 (ref 3.4–10.8)

## 2023-12-05 PROCEDURE — 25010000002 ENOXAPARIN PER 10 MG: Performed by: INTERNAL MEDICINE

## 2023-12-05 PROCEDURE — 25010000002 ONDANSETRON PER 1 MG: Performed by: INTERNAL MEDICINE

## 2023-12-05 PROCEDURE — 85025 COMPLETE CBC W/AUTO DIFF WBC: CPT | Performed by: INTERNAL MEDICINE

## 2023-12-05 PROCEDURE — 80053 COMPREHEN METABOLIC PANEL: CPT | Performed by: INTERNAL MEDICINE

## 2023-12-05 RX ORDER — SPIRONOLACTONE 25 MG/1
25 TABLET ORAL DAILY
Qty: 30 TABLET | Refills: 0 | Status: SHIPPED | OUTPATIENT
Start: 2023-12-06 | End: 2024-01-05

## 2023-12-05 RX ORDER — PANTOPRAZOLE SODIUM 40 MG/1
40 TABLET, DELAYED RELEASE ORAL
Qty: 60 TABLET | Refills: 0 | Status: SHIPPED | OUTPATIENT
Start: 2023-12-05 | End: 2024-01-04

## 2023-12-05 RX ORDER — SUCRALFATE 1 G/1
1 TABLET ORAL
Qty: 120 TABLET | Refills: 0 | Status: SHIPPED | OUTPATIENT
Start: 2023-12-05 | End: 2024-01-04

## 2023-12-05 RX ADMIN — SUCRALFATE 1 G: 1 TABLET ORAL at 06:53

## 2023-12-05 RX ADMIN — SPIRONOLACTONE 25 MG: 25 TABLET ORAL at 08:20

## 2023-12-05 RX ADMIN — LEVOTHYROXINE SODIUM 75 MCG: 75 TABLET ORAL at 06:53

## 2023-12-05 RX ADMIN — ACETAMINOPHEN 650 MG: 325 TABLET ORAL at 03:18

## 2023-12-05 RX ADMIN — PANTOPRAZOLE SODIUM 40 MG: 40 TABLET, DELAYED RELEASE ORAL at 06:53

## 2023-12-05 RX ADMIN — METHOCARBAMOL 500 MG: 500 TABLET ORAL at 08:20

## 2023-12-05 RX ADMIN — HYDROCODONE BITARTRATE AND ACETAMINOPHEN 1 TABLET: 7.5; 325 TABLET ORAL at 10:56

## 2023-12-05 RX ADMIN — Medication 10 ML: at 08:21

## 2023-12-05 RX ADMIN — ONDANSETRON 4 MG: 2 INJECTION INTRAMUSCULAR; INTRAVENOUS at 06:53

## 2023-12-05 RX ADMIN — SUCRALFATE 1 G: 1 TABLET ORAL at 10:56

## 2023-12-05 RX ADMIN — HYDROCODONE BITARTRATE AND ACETAMINOPHEN 1 TABLET: 7.5; 325 TABLET ORAL at 04:54

## 2023-12-05 RX ADMIN — ENOXAPARIN SODIUM 40 MG: 100 INJECTION SUBCUTANEOUS at 08:20

## 2023-12-05 NOTE — PLAN OF CARE
Goal Outcome Evaluation:                   Discharge home today         Present, accurate, and signed

## 2023-12-05 NOTE — DISCHARGE SUMMARY
[facility]         DISCHARGE SUMMARY    Patient Name: Lona Rodríguez  : 1950  MRN: [id number]    Date of Admission: 2023  Date of Discharge:   Primary Care Physician: Christopher Thao MD    Consults       Date and Time Order Name Status Description    2023 11:26 AM Inpatient Gastroenterology Consult      2023 11:36 PM Inpatient Hospitalist Consult              Presenting Problem:   Hypokalemia [E87.6]  Weakness [R53.1]    Active and Resolved Hospital Problems:  Active Hospital Problems    Diagnosis POA   • GERD (gastroesophageal reflux disease) [K21.9] Yes   • Chronic nausea [R11.0] Yes   • Pedal edema [R60.0] Yes      Resolved Hospital Problems    Diagnosis POA   • **Hypokalemia [E87.6] Yes   • Dizziness [R42] Yes   • Nausea and vomiting [R11.2] Yes         Hospital Course     Hospital Course:  Lona Rodríguez is a 73 y.o. female admitted to hospital for severe hypokalemia related to diuretic usage..  Patient was started on potassium replacement, see H&P for details.    Patient's hospital course was slow and protracted she had extreme weakness, she was complaining of chronic nausea.  She also reported GI symptoms after 2 days she said she started to have abdominal pain and she has ongoing pain for the last several weeks to months.  She requested workup while she is in hospital.    Patient potassium and sodium was corrected.  Patient started to feel better.  But she was insistent on getting a GI workup for her nausea.  Gastroenterologist was consulted who performed EGD which showed gastritis and esophagitis.,  \PPIs were initiated.  Patient is feeling much better today.  She will be discharged to home on PPI and Carafate  She was concerned about her leg swelling, she was started on Aldactone.  Potassium was normal today.  She was advised to follow-up with PCP  I recommended her to go to inpatient rehab as she was feeling weak but patient refused and wants to go  home.    Part of her workup included an ultrasound of gallbladder and venous Doppler which were both negative except for hepatic steatosis      DISCHARGE Follow Up Recommendations for labs and diagnostics:   Discharge to home with outpatient follow-up  Monitor labs including potassium and magnesium    Day of Discharge     Vital Signs:  Temp:  [97.7 °F (36.5 °C)-98.2 °F (36.8 °C)] 97.9 °F (36.6 °C)  Heart Rate:  [76-99] 86  Resp:  [18] 18  BP: (111-150)/(51-71) 114/67    Physical Exam:    Elderly obese female not in acute distress.  Heart regular.  Lungs clear.  Abdomen obese and soft nontender.  Extremities with 2+ chronic edema.  No calf tenderness.      Pertinent  and/or Most Recent Results     LAB RESULTS:      Lab 12/05/23  0539 12/02/23  0616 11/30/23  0443 11/29/23  0359 11/28/23 1943   WBC 5.13 5.47 7.24 12.44* 13.23*   HEMOGLOBIN 10.1* 11.1* 10.3* 12.2 13.3   HEMATOCRIT 30.7* 33.9* 31.0* 37.9 38.9   PLATELETS 298 318 299 264 433   NEUTROS ABS 2.52 2.97 4.63 9.59* 10.48*   IMMATURE GRANS (ABS) 0.08* 0.09* 0.10* 0.10* 0.11*   LYMPHS ABS 1.41 1.26 1.34 1.31 1.12   MONOS ABS 0.79 0.77 0.99* 1.40* 1.41*   EOS ABS 0.29 0.32 0.12 0.01 0.08   MCV 88.7 87.1 86.4 90.7 84.0         Lab 12/05/23  0539 12/02/23  0616 12/01/23  0954 11/30/23  0443 11/29/23  0635 11/28/23 1943   SODIUM 137 134* 126* 123* 128* 127*   POTASSIUM 3.9 3.8 3.4* 3.3* 2.8* 2.3*   CHLORIDE 105 98 89* 86* 84* 76*   CO2 23.0 28.6 27.5 27.5 30.4* 34.1*   ANION GAP 9.0 7.4 9.5 9.5 13.6 16.9*   BUN 3* 4* 5* 8 9 10   CREATININE 0.51* 0.58 0.54* 0.59 0.67 0.64   EGFR 98.7 95.7 97.4 95.3 92.4 93.4   GLUCOSE 88 90 87 86 95 82   CALCIUM 8.2* 8.3* 8.2* 7.9* 7.6* 8.7   MAGNESIUM  --  1.7  --  1.6  --  1.9   TSH  --   --  2.900  --   --   --          Lab 12/05/23  0539 12/02/23  0616 12/01/23  0954 11/30/23  0443 11/29/23  0635 11/28/23  1943   TOTAL PROTEIN 4.5* 5.1*  --  4.5* 5.1* 6.1   ALBUMIN 2.2* 2.4*  --  2.1* 2.4* 3.0*   GLOBULIN 2.3 2.7  --  2.4 2.7  3.1   ALT (SGPT) 15 10  --  7 8 9   AST (SGOT) 29 20  --  17 17 23   BILIRUBIN 0.4 0.4  --  0.5 0.7 0.7   ALK PHOS 123* 128*  --  113 132* 161*   LIPASE  --   --  17  --   --   --          Lab 11/28/23 1943   HSTROP T 18*                 Brief Urine Lab Results  (Last result in the past 365 days)        Color   Clarity   Blood   Leuk Est   Nitrite   Protein   CREAT   Urine HCG        11/29/23 0352 Yellow   Clear   Negative   Trace   Negative   Negative                 Microbiology Results (last 10 days)       ** No results found for the last 240 hours. **            PROCEDURES:    CT Abdomen Pelvis With Contrast    Result Date: 12/2/2023  Impression:    1. No acute process demonstrated  2. Moderate hiatal hernia  3. Colonic and duodenal diverticulosis  4. Cystic right ovarian lesion.  Recommend initial characterization with ultrasound     MICHELLE MAGALLON MD       Electronically Signed and Approved By: MICHELLE MAGALLON MD on 12/02/2023 at 18:09             US Gallbladder    Result Date: 12/2/2023  Impression:   1. Visualized liver parenchyma is diffusely increased in echogenicity compatible with hepatic steatosis.  Please correlate with liver function test 2. Gallbladder appears unremarkable in appearance     LUC JENSEN MD       Electronically Signed and Approved By: LUC JENSEN MD on 12/02/2023 at 7:28             CT Chest With Contrast Diagnostic    Result Date: 11/28/2023  Impression:  No pulmonary embolism.  No pneumonia.     Please note that portions of this note were completed with a voice recognition program.  KRYSTEN CLEMENTE JR, MD       Electronically Signed and Approved By: KRYSTEN CLEMENTE JR, MD on 11/28/2023 at 23:30              CT Head Without Contrast    Result Date: 11/28/2023  Impression:   No acute brain abnormality is seen.  New acute sinusitis is suspected, especially involving the left sphenoid paranasal sinus.    Please note that portions of this note were completed with a voice  recognition program.  KRYSTEN CLEMENTE JR, MD       Electronically Signed and Approved By: KRYSTEN CLEMENTE JR, MD on 11/28/2023 at 23:22              XR Chest 1 View    Result Date: 11/28/2023  Impression:  Atelectasis or pneumonia in the left lung base       MICHELLE MAGALLON MD       Electronically Signed and Approved By: MICHELLE MAGALLON MD on 11/28/2023 at 20:22             Mammo Diagnostic Digital Tomosynthesis Left With CAD    Result Date: 11/15/2023  Impression: Left breast:  Persistent skin and trabecular thickening of the left breast.  Unfortunately, the patient is unable to tolerate breast MRI for further assessment.  Given the lack of significant uptake on PET-CT the current appearance is favorable for a chronic post treatment changes.  Should the patient have a recurrent edema or erythema of the left breast recommend skin punch biopsy. Recommend additional 6 month follow-up mammogram.  All findings and recommendations discussed directly with the patient at the time of exam.   RECOMMENDATION(S):  SHORT TERM FOLLOW-UP DIAGNOSTIC MAMMOGRAM LEFT BREAST IN 6 MONTHS.   SHORT TERM FOLLOW-UP ULTRASOUND LEFT BREAST if indicated IN 6 MONTHS.   BIRADS:  DIAGNOSTIC CATEGORY 3--PROBABLY BENIGN FINDING.   BREAST COMPOSITION: Scattered areas fibroglandular density.  PLEASE NOTE:  A NORMAL MAMMOGRAM DOES NOT EXCLUDE THE POSSIBILITY OF BREAST CANCER. ANY CLINICALLY SUSPICIOUS PALPABLE LUMP SHOULD BE BIOPSIED.      Lakshmi Schroeder M.D.       Electronically Signed and Approved By: Lakshmi Schroeder M.D. on 11/15/2023 at 15:08              Results for orders placed during the hospital encounter of 09/13/23    Duplex venous lower extremity bilateral CAR    Interpretation Summary  •  Normal bilateral lower extremity venous duplex scan.      Results for orders placed during the hospital encounter of 09/13/23    Duplex venous lower extremity bilateral CAR    Interpretation Summary  •  Normal bilateral lower extremity venous duplex  scan.          Labs Pending at Discharge:  Pending Labs       Order Current Status    Tissue Pathology Exam In process              Discharge Details        Discharge Medications        New Medications        Instructions Start Date   pantoprazole 40 MG EC tablet  Commonly known as: PROTONIX   40 mg, Oral, 2 Times Daily Before Meals      spironolactone 25 MG tablet  Commonly known as: ALDACTONE   25 mg, Oral, Daily   Start Date: December 6, 2023     sucralfate 1 g tablet  Commonly known as: CARAFATE   1 g, Oral, 4 Times Daily Before Meals & Nightly             Continue These Medications        Instructions Start Date   amitriptyline 50 MG tablet  Commonly known as: ELAVIL   50 mg, Oral, Nightly      atorvastatin 20 MG tablet  Commonly known as: LIPITOR   20 mg, Oral, Daily      HYDROcodone-acetaminophen 7.5-325 MG per tablet  Commonly known as: NORCO   1 tablet, Oral, Every 6 Hours      levothyroxine 75 MCG tablet  Commonly known as: SYNTHROID, LEVOTHROID   75 mcg, Oral, Daily      methocarbamol 500 MG tablet  Commonly known as: ROBAXIN   1 tablet, Oral, Every 12 Hours Scheduled      metoclopramide 5 MG tablet  Commonly known as: REGLAN   Take 1 tablet by mouth 4 (Four) Times a Day.      montelukast 10 MG tablet  Commonly known as: SINGULAIR   10 mg, Oral, Nightly      Tylenol 325 MG tablet  Generic drug: acetaminophen   650 mg, Oral, Every 6 Hours PRN      vitamin D 1.25 MG (72447 UT) capsule capsule  Commonly known as: ERGOCALCIFEROL   50,000 Units, Oral, 2 Times Weekly, Monday and Wednesday             Stop These Medications      meclizine 25 MG tablet  Commonly known as: ANTIVERT     omeprazole 40 MG capsule  Commonly known as: priLOSEC              Allergies   Allergen Reactions   • Morphine Anaphylaxis         Discharge Disposition:    Home or Self Care    Diet:    Heart healthy    Discharge Activity:     Activity Instructions       Activity as Tolerated              No future appointments.    Additional  Instructions for the Follow-ups that You Need to Schedule       Discharge Follow-up with PCP   As directed       Currently Documented PCP:    Christopher Thao MD    PCP Phone Number:    [phone]     Follow Up Details: Next week                Time spent on Discharge including face to face service: 37 minutes.            I have dictated this note utilizing Dragon Dictation.             Please note that portions of this note were completed with a voice recognition program.             Part of this note may be an electronic transcription/translation of spoken language to printed text         using the Dragon Dictation System.       Electronically signed by Luiz Diana MD, 12/05/23, 9:41 AM EST.

## 2023-12-05 NOTE — PLAN OF CARE
Goal Outcome Evaluation:           Progress: no change  Outcome Evaluation: Vss. Zofran given for nausea, Norco for pain. No changes. Lower extremity edema improving.

## 2023-12-06 LAB
CYTO UR: NORMAL
LAB AP CASE REPORT: NORMAL
LAB AP CLINICAL INFORMATION: NORMAL
LAB AP SPECIAL STAINS: NORMAL
PATH REPORT.FINAL DX SPEC: NORMAL
PATH REPORT.GROSS SPEC: NORMAL

## 2023-12-06 NOTE — OUTREACH NOTE
Prep Survey      Flowsheet Row Responses   [facility] facility patient discharged from? [facility]   Is LACE score < 7 ? No   Eligibility Readm Mgmt   Discharge diagnosis Hypokalemia   Does the patient have one of the following disease processes/diagnoses(primary or secondary)? Other   Does the patient have Home health ordered? Yes   What is the Home health agency?  [facility]   Is there a DME ordered? No   Prep survey completed? Yes            Shea ARIZMENDI - Registered Nurse

## 2023-12-08 ENCOUNTER — TELEPHONE (OUTPATIENT)
Dept: GENERAL RADIOLOGY | Facility: HOSPITAL | Age: 73
End: 2023-12-08
Payer: MEDICARE

## 2023-12-08 NOTE — TELEPHONE ENCOUNTER
Patient mammography result letter was returned for reason unclaimee, unable to forward. Per the patient, she is unable to get to her mailbox and the  will not deliver it to her house. The patient stated she is aware of her mammogram results.

## 2023-12-13 ENCOUNTER — TELEPHONE (OUTPATIENT)
Dept: GASTROENTEROLOGY | Facility: CLINIC | Age: 73
End: 2023-12-13
Payer: MEDICARE

## 2023-12-13 ENCOUNTER — READMISSION MANAGEMENT (OUTPATIENT)
Dept: CALL CENTER | Facility: HOSPITAL | Age: 73
End: 2023-12-13
Payer: MEDICARE

## 2023-12-13 RX ORDER — ONDANSETRON 4 MG/1
TABLET, FILM COATED ORAL
COMMUNITY
Start: 2023-12-07

## 2023-12-13 NOTE — OUTREACH NOTE
Prep Survey      Flowsheet Row Responses   [employer] facility patient discharged from? [facility]   Discharge diagnosis Hypokalemia   Does the patient have one of the following disease processes/diagnoses(primary or secondary)? Other   What is the Home health agency?  [facility]            JAIRO ARIZMENDI - Registered Nurse

## 2023-12-14 ENCOUNTER — OFFICE VISIT (OUTPATIENT)
Dept: SURGERY | Facility: CLINIC | Age: 73
End: 2023-12-14
Payer: MEDICARE

## 2023-12-14 VITALS — RESPIRATION RATE: 18 BRPM | BODY MASS INDEX: 33.27 KG/M2 | WEIGHT: 207 LBS | HEIGHT: 66 IN

## 2023-12-14 DIAGNOSIS — R23.4 BREAST SKIN CHANGES: Primary | ICD-10-CM

## 2023-12-14 DIAGNOSIS — R92.2 INCONCLUSIVE MAMMOGRAM: ICD-10-CM

## 2023-12-14 PROCEDURE — 1160F RVW MEDS BY RX/DR IN RCRD: CPT | Performed by: SURGERY

## 2023-12-14 PROCEDURE — 99212 OFFICE O/P EST SF 10 MIN: CPT | Performed by: SURGERY

## 2023-12-14 PROCEDURE — 1159F MED LIST DOCD IN RCRD: CPT | Performed by: SURGERY

## 2023-12-14 NOTE — PROGRESS NOTES
Chief Complaint: Follow-up    Subjective         History of Present Illness  Lona Rodríguez is a 73 y.o. female presents to [facility] to be seen for breast skin changes and enlargement of left breast.  She has a hx of breast cancer in this breast in 1996.  Her imaging is shown below:    Narrative & Impression   PROCEDURE:  MAMMO DIAGNOSTIC DIGITAL TOMOSYNTHESIS BILATERAL W CAD, 7/12/2023, 13:52  US BREAST LEFT LIMITED, 7/12/2023, 14:12     COMPARISON:  [facility], , DIG DIAG LEFT JASVIR W 3D JM, 1/25/2021, 15:05.     VIEWS:  DIAGNOSTIC VIEWS WERE OBTAINED UTILIZING 3D TOMOSYNTHESIS AND R2 CAD SOFTWARE     INDICATIONS:  73-year-old female with remote history of left breast cancer/lumpectomy 1996/1997.    She presents for diagnostic evaluation of intermittent left breast swelling.     FINDINGS:          Technologist note:  Patient with limited range of motion.  Images obtained best possible.  Portions   of the breast nonvisualized cannot be evaluated.     Right breast:  Partially visualized right port catheter.  There are benign-type calcifications   within the right breast.  There are no dominant masses, suspicious microcalcifications or areas of   architectural distortion in the right breast.     Left breast:  Visualized left breast demonstrates progressive coarse calcifications within the   lumpectomy bed compatible with evolving fat necrosis.  There are numerous secretory type   calcifications within the left breast.  There is diffuse trabecular thickening and increased global   asymmetry of the visualized left breast some of which is likely from chronic post treatment   changes.  Superimposed edema or diffuse infiltrative neoplastic disease can have a similar   appearance.  There is chronic diffuse skin thickening.     High-resolution focused left breast ultrasound at the 12 o'clock position 8 cm from the nipple,   0300 hours 5-8 cm from the nipple, 0800 hours 8  cm from the nipple, 0600 hours  5 cm from the   nipple and 0900 hours 8 cm from the nipple, reported areas of breast swelling, demonstrate diffuse   breast edema.  No discrete mass or areas of abnormal shadowing.  There is moderate in skin   thickening at the 1200 hours and 0300 hours positions.     IMPRESSION:  Right breast:  Benign mammogram.  Recommend routine right breast screening.     Left breast:  Trabecular thickening and diffuse edema of the left breast with localized skin   thickening pronounced at the  hours positions.  Etiologies for breast edema and trabecular   thickening include congestive heart failure/problems with cardiac pump function.  The patient   reports six-month history of gradual increasing Lasix dosage to treat peripheral edema which she   reports has improved.  She also reports a history of shortness of breath on exertion which has also   improved with Lasix administration.  She has been scheduled for cardiac consultation 08/2023.  Other etiologies for trabecular thickening included diffuse neoplastic disease.  At this time   suggest short interval four-month mammographic and sonographic follow-up.  Pending cardiac   consultation results, the more immediate skin punch biopsy in the area of edema and or breast MRI   can be performed given high risk.  All findings and recommendations discussed directly with the   patient at the time of exam.     RECOMMENDATION(S):               CLINICAL EVALUATION.       SHORT TERM FOLLOW-UP DIAGNOSTIC MAMMOGRAM LEFT BREAST IN 4 MONTHS.       SHORT TERM FOLLOW-UP ULTRASOUND LEFT BREAST IN 4 MONTHS.       BIRADS:               DIAGNOSTIC CATEGORY 3--PROBABLY BENIGN FINDING.       BREAST COMPOSITION:          Scattered areas fibroglandular density.          She was unable to get her MRI but was able to get her PET scan.      Narrative & Impression   PROCEDURE:  NM PET SKULL BASE TO MID THIGH     COMPARISON: None     INDICATIONS:  NEOPLASM OF LT  BREAST     TECHNIQUE:    After obtaining the patient's consent, F-18 FDG was administered intravenously.  PET/CT   imaging was performed from skull to thigh with multi-planar imaging without oral or intravenous   contrast material, using a dedicated integrated PET/CT scanner.       RADIONUCLIDE:     9.32 MCI   F18 FDG- I.V.  LABS:                          Blood Glucose 122 mg/dl  UPTAKE TIME:        49 mins         MEDIASTINAL BACKGROUND UPTAKE:  2.1, background liver parenchyma max SUV 2.7.     FINDINGS:          HEAD/NECK:    No suspicious FDG avid mass.  Very small mildly hypermetabolic left cervical lymph nodes   for example 7 mm short axis level 1 B node image 40 series 202 max SUV 1.9 and 0.6 cm short axis   left level 2 lymph node max SUV 3.2.  No suspicious adenopathy.  Carotid atherosclerotic disease   with partial retropharyngeal course.  LUNGS:             No suspicious FDG avid mass.  Left pleural based calcifications with associated thickening   and probable small nodular area of round atelectasis image 112 series 202 measuring 0.9 x 1.7 cm.    This could represent sequelae of prior infectious/inflammatory process versus prior trauma or   surgical changes.  MEDIASTINUM/MERRICK:    Mild cardiomegaly.  Aortic atherosclerotic disease without aneurysm.  Moderate to   large sliding hiatal hernia.  No suspicious FDG avid mass.  Right-sided chest port terminates   within mid SVC.  CHEST WALL/AXILLA:  No suspicious FDG avid mass.  No suspicious FDG avid adenopathy.  Linear   calcifications in the left breast.  ABDOMEN:       No suspicious FDG avid mass.  Physiologic uptake in the renal collecting system and GI   tract.  Multiple duodenal diverticula.  Fatty infiltration and atrophy of the pancreas.  Aortic   atherosclerotic disease.  Colonic diverticulosis.  PELVIS:             Multilocular cystic lesion in the right adnexa measuring up to 5.5 x 4.6 cm without   suspicious FDG activity this is incompletely  evaluated on this noncontrast PET-CT.  BONES:             No FDG avid destructive bone lesion.  Old healed left pubic ramus fractures.  Healed left   iliac wing deformity.  Multiple contiguous nondisplaced right-sided rib fractures, sclerosis and   callus formation suggest at most a subacute to chronic process.  Mild FDG activity associated with   the right anterior 8th rib max SUV 3.5.  Degenerative related uptake in the bilateral glenohumeral   joints.  Multilevel lumbar spondylosis noted.  OTHER:             Negative.       IMPRESSION:                 1. No suspicious FDG avid mass or adenopathy to suggest metastatic disease.  2. Small mildly hypermetabolic left level 1B and 2 cervical lymph nodes, almost certainly   benign/reactive.  3. Multiple contiguous nondisplaced right-sided rib fractures, likely subacute to chronic in   etiology.  Mild FDG activity within the right anterior 8th rib without underlying bone lesion,   possibly a more recent nondisplaced fracture.  Recommend correlation with point tenderness.          We discussed options including skin punch biopsy vs repeat imaging and she would like to pursue the repeat imaging as she feels that her breast is unchanged over the last 2 or so years.     Her most recent imaging is shown below:    Narrative & Impression   PROCEDURE:  MAMMO DIAGNOSTIC DIGITAL TOMOSYNTHESIS LEFT W CAD     COMPARISON:  [facility], , MAMMO DIAGNOSTIC DIGITAL TOMOSYNTHESIS BILATERAL W CAD,   7/12/2023, 13:52.     VIEWS:  DIAGNOSTIC VIEWS WERE OBTAINED UTILIZING 3D TOMOSYNTHESIS AND R2 CAD SOFTWARE     INDICATIONS:  73-year-old female history of left breast cancer 1997. Recent complaints of left   breast edema in July/2023.  She presents for short-term follow-up.     FINDINGS:                 Left breast:  There is persistent skin and trabecular thickening of the left breast slightly   improved from previous but not significantly changed.  There are several coarse  dystrophic   calcifications and calcifications related to fat necrosis in the upper inner left breast.  On   directed clinical exam, the left breast is firm, however there is no significant erythema or   warmth.  The patient was unable to tolerate breast MRI (8/25/2023) due to significant discomfort of   her back and ribs.  She underwent a PET scan (9/1/2023) without significant uptake reported in the   left breast.     IMPRESSION:  Left breast:  Persistent skin and trabecular thickening of the left breast.  Unfortunately, the   patient is unable to tolerate breast MRI for further assessment.  Given the lack of significant   uptake on PET-CT the current appearance is favorable for a chronic post treatment changes.  Should   the patient have a recurrent edema or erythema of the left breast recommend skin punch biopsy.  Recommend additional 6 month follow-up mammogram.  All findings and recommendations discussed   directly with the patient at the time of exam.       RECOMMENDATION(S):               SHORT TERM FOLLOW-UP DIAGNOSTIC MAMMOGRAM LEFT BREAST IN 6 MONTHS.       SHORT TERM FOLLOW-UP ULTRASOUND LEFT BREAST if indicated IN 6 MONTHS.       BIRADS:               DIAGNOSTIC CATEGORY 3--PROBABLY BENIGN FINDING.       Her breast exam has not changed.  She has a calcified lesion just under scar but does not want to have it excised at this time when this was offered.  Objective     Past Medical History:   Diagnosis Date    AMD (age related macular degeneration)     Anemia Following surgery    Arthritis     Asthma     Breast cancer Left breast.    Breast mass 2 lumpectomies 1 cancerous 1 benign    CHF (congestive heart failure) Just getting evaluation    Colon polyp Removed during colonos    COPD (chronic obstructive pulmonary disease)     Coronary artery disease Chf    Deep vein thrombosis 1979    Fibrocystic breast Left breast    Fibromyalgia     History of transfusion Aug 2019 2 units    Hyperlipidemia      Hypertension     Nausea and vomiting 11/29/2023    Obesity     Polymyalgia arteritica     Postoperative wound infection Lymph node resection site,5th toe left foot infection after neuroma surgery. 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    Sciatica        Past Surgical History:   Procedure Laterality Date    BREAST BIOPSY  Left    BREAST SURGERY  Lumpectomies left breast    ENDOSCOPY N/A 12/2/2023    Procedure: ESOPHAGOGASTRODUODENOSCOPY WITH BIOPSIES;  Surgeon: Maximino Aceves MD;  Location: [facility] END[facility]COPY;  Service: Gastroenterology;  Laterality: N/A;  ESOPHAGITIS, GASTRITIS, HIATAL HERNIA    EYE SURGERY  Lens implants both eyes    REPLACEMENT TOTAL KNEE      TONSILLECTOMY           Current Outpatient Medications:     acetaminophen (Tylenol) 325 MG tablet, Take 2 tablets by mouth Every 6 (Six) Hours As Needed for Mild Pain., Disp: , Rfl:     amitriptyline (ELAVIL) 50 MG tablet, Take 1 tablet by mouth Every Night., Disp: , Rfl:     atorvastatin (LIPITOR) 20 MG tablet, Take 1 tablet by mouth Daily., Disp: , Rfl:     HYDROcodone-acetaminophen (NORCO) 7.5-325 MG per tablet, Take 1 tablet by mouth Every 6 (Six) Hours., Disp: , Rfl:     levothyroxine (SYNTHROID, LEVOTHROID) 75 MCG tablet, Take 1 tablet by mouth Daily., Disp: , Rfl:     methocarbamol (ROBAXIN) 500 MG tablet, Take 1 tablet by mouth Every 12 (Twelve) Hours., Disp: , Rfl:     metoclopramide (REGLAN) 5 MG tablet, Take 1 tablet by mouth 4 (Four) Times a Day., Disp: , Rfl:     montelukast (SINGULAIR) 10 MG tablet, Take 1 tablet by mouth Every Night., Disp: , Rfl:     ondansetron (ZOFRAN) 4 MG tablet, TAKE ONE TABLET BY MOUTH EVERY 8 HOURS for 7 days, Disp: , Rfl:     pantoprazole (PROTONIX) 40 MG EC tablet, Take 1 tablet by mouth 2 (Two) Times a Day Before Meals for 30 days., Disp: 60 tablet, Rfl: 0    spironolactone (ALDACTONE) 25 MG tablet, Take 1 tablet by mouth Daily for 30 days., Disp: 30 tablet, Rfl: 0    sucralfate (CARAFATE) 1 g tablet, Take 1 tablet by mouth 4  "(Four) Times a Day Before Meals & at Bedtime for 30 days., Disp: 120 tablet, Rfl: 0    vitamin D (ERGOCALCIFEROL) 1.25 MG (77977 UT) capsule capsule, Take 1 capsule by mouth 2 (Two) Times a Week. Monday and Wednesday, Disp: , Rfl:     Allergies   Allergen Reactions    Morphine Anaphylaxis        Family History   Problem Relation Age of Onset    Arthritis Mother     Cancer Mother     Heart disease Mother     Arthritis Father     COPD Father     Arthritis Brother     Diabetes Brother     Learning disabilities Brother         Dyslexic    Mental illness Maternal Aunt         Schizophrenic       Social History     Socioeconomic History    Marital status: Single   Tobacco Use    Smoking status: Never    Smokeless tobacco: Never    Tobacco comments:     Grew up with [id number]   Vaping Use    Vaping Use: Never used   Substance and Sexual Activity    Alcohol use: Never    Drug use: Never    Sexual activity: Not Currently     Partners: Male     Birth control/protection: Diaphragm, Abstinence       Vital Signs:   Resp 18   Ht 167.6 cm (66\")   Wt 93.9 kg (207 lb)   BMI 33.41 kg/m²    Review of Systems    Physical Exam  Vitals and nursing note reviewed.   Constitutional:       Appearance: Normal appearance.   HENT:      Head: Normocephalic and atraumatic.   Eyes:      Extraocular Movements: Extraocular movements intact.      Pupils: Pupils are equal, round, and reactive to light.   Cardiovascular:      Pulses: Normal pulses.   Pulmonary:      Effort: Pulmonary effort is normal. No accessory muscle usage or respiratory distress.   Chest:   Breasts:     Right: Normal. No inverted nipple, nipple discharge or skin change.      Left: Normal. Swelling and skin change present. No inverted nipple, mass or nipple discharge.      Comments: The left breast is tense and swollen and grossly abnormal appearing when compared to the right.    Abdominal:      General: Abdomen is flat.      Palpations: Abdomen is soft.      Tenderness: There is " no abdominal tenderness. There is no guarding.   Musculoskeletal:         General: No swelling, tenderness or deformity.      Cervical back: Neck supple.   Lymphadenopathy:      Upper Body:      Right upper body: No supraclavicular or axillary adenopathy.      Left upper body: No supraclavicular or axillary adenopathy.   Skin:     General: Skin is warm and dry.   Neurological:      General: No focal deficit present.      Mental Status: She is alert and oriented to person, place, and time.   Psychiatric:         Mood and Affect: Mood normal.         Thought Content: Thought content normal.        Linear calcified lesion under scar  Result Review :               Assessment and Plan    Diagnoses and all orders for this visit:    1. Breast skin changes (Primary)  -     Mammo Diagnostic Left With CAD; Future    2. Inconclusive mammogram  -     Mammo Diagnostic Left With CAD; Future    Followup 6 months    Follow Up   Return in about 6 months (around 6/14/2024).  Patient was given instructions and counseling regarding her condition or for health maintenance advice. Please see specific information pulled into the AVS if appropriate.         This document has been electronically signed by Lara Terry MD  December 14, 2023 12:06 EST

## 2023-12-14 NOTE — TELEPHONE ENCOUNTER
Unable to reach patient:  voice mail not set up.    I left a voice message with neighbor to verify contact information for patient.  Provided my direct line.

## 2023-12-15 ENCOUNTER — PREP FOR SURGERY (OUTPATIENT)
Dept: OTHER | Facility: HOSPITAL | Age: 73
End: 2023-12-15
Payer: MEDICARE

## 2023-12-15 DIAGNOSIS — K20.90 ESOPHAGITIS DETERMINED BY ENDOSCOPY: Primary | ICD-10-CM

## 2023-12-15 NOTE — TELEPHONE ENCOUNTER
"Spoke to patient and informed of KEVIN Ta result note and recommendations. Verified patient understanding.    Confirmed patient is taking pantoprazole and sucralfate as prescribed.  Patient states she sometimes has trouble \"getting Carafate down\".  Advised patient she can dissolve the pill in a small amount of water if she'd like.  Patient voiced understanding.    Patient states she has not had an appetite for quite some time and gets nauseous at the thought of food. States she has experienced acid reflux \"for a long time\" and used to be on Prilosec.   Encouraged patient to remain on pantoprazole and sucralfate to help with the irritation and inflammation of esophagus and gastric lining.  Educated that it may take a few weeks to feel relief of acid reflux symptoms.  Also talked to patient about lifestyle modifications; patient states she has lost a lot of weight of the last year and does have a hospital bed and sleeps with HOB elevated.      Encouraged patient to drink the protein drinks daily that were given to her by her [employer] nurse.  Also encouraged patient that when she does have an appetite to make the snack/meal count, choosing an option that has protein and nutrients instead of junk foods.  Patient verbalized understanding.    Patient agreeable to repeat EGD.  Patient uses [employer] for transportation.  Denies being on a blood thinner.  Reports seeing Dr. Greene for cardiology.    CR entered for second sign.      "

## 2023-12-15 NOTE — TELEPHONE ENCOUNTER
Spoke with patient and advised of EGD scheduled for Thursday, 01.18.23 with arrival time of 1030.  Educated on prep instructions including NPO after midnight.  Patient is using [employer] for transportation       Verified and updated address:  mailed reminder.

## 2023-12-21 ENCOUNTER — READMISSION MANAGEMENT (OUTPATIENT)
Dept: CALL CENTER | Facility: HOSPITAL | Age: 73
End: 2023-12-21
Payer: MEDICARE

## 2023-12-21 NOTE — OUTREACH NOTE
Medical Week 3 Survey      Flowsheet Row Responses   [facility] patient discharged from? [facility]   Does the patient have one of the following disease processes/diagnoses(primary or secondary)? Other   Week 3 attempt successful? Yes   Call start time 1550   Call end time 1604   Discharge diagnosis Hypokalemia   Meds reviewed with patient/caregiver? Yes   Is the patient taking all medications as directed (includes completed medication regime)? Yes   Does the patient have a primary care provider?  Yes   Has the patient kept scheduled appointments due by today? Yes   What is the Home health agency?  [facility]   Has home health visited the patient within 72 hours of discharge? Yes   Home health comments HH visits   What is the patient's perception of their health status since discharge? Improving   Week 3 Call Completed? Yes   Graduated Yes   Graduated/Revoked comments Pt doing better regarding her hosp dc diagnosis   Call end time 1604            Peyton CHANG - Registered Nurse

## 2023-12-22 DIAGNOSIS — R23.4 BREAST SKIN CHANGES: Primary | ICD-10-CM

## 2023-12-22 DIAGNOSIS — R92.2 INCONCLUSIVE MAMMOGRAM: ICD-10-CM

## 2024-01-08 ENCOUNTER — APPOINTMENT (OUTPATIENT)
Dept: GENERAL RADIOLOGY | Facility: HOSPITAL | Age: 74
End: 2024-01-08
Payer: MEDICARE

## 2024-01-08 ENCOUNTER — APPOINTMENT (OUTPATIENT)
Dept: CT IMAGING | Facility: HOSPITAL | Age: 74
End: 2024-01-08
Payer: MEDICARE

## 2024-01-08 ENCOUNTER — HOSPITAL ENCOUNTER (INPATIENT)
Facility: HOSPITAL | Age: 74
LOS: 9 days | Discharge: NURSING, REHAB OR LONG-TERM CARE | End: 2024-01-18
Attending: EMERGENCY MEDICINE | Admitting: INTERNAL MEDICINE
Payer: MEDICARE

## 2024-01-08 DIAGNOSIS — G89.29 CHRONIC LEFT HIP PAIN: Chronic | ICD-10-CM

## 2024-01-08 DIAGNOSIS — N39.0 ACUTE UTI (URINARY TRACT INFECTION): ICD-10-CM

## 2024-01-08 DIAGNOSIS — E87.6 ACUTE HYPOKALEMIA: Primary | ICD-10-CM

## 2024-01-08 DIAGNOSIS — Z78.9 DECREASED ACTIVITIES OF DAILY LIVING (ADL): ICD-10-CM

## 2024-01-08 DIAGNOSIS — R26.2 DIFFICULTY IN WALKING: ICD-10-CM

## 2024-01-08 DIAGNOSIS — G72.3 HYPOKALEMIC PERIODIC PARALYSIS: ICD-10-CM

## 2024-01-08 DIAGNOSIS — M25.552 CHRONIC LEFT HIP PAIN: Chronic | ICD-10-CM

## 2024-01-08 DIAGNOSIS — R79.89 ELEVATED LFTS: ICD-10-CM

## 2024-01-08 LAB
ALBUMIN SERPL-MCNC: 2.9 G/DL (ref 3.5–5.2)
ALBUMIN/GLOB SERPL: 0.9 G/DL
ALP SERPL-CCNC: 192 U/L (ref 39–117)
ALT SERPL W P-5'-P-CCNC: 79 U/L (ref 1–33)
ANION GAP SERPL CALCULATED.3IONS-SCNC: 16 MMOL/L (ref 5–15)
AST SERPL-CCNC: 118 U/L (ref 1–32)
BACTERIA UR QL AUTO: ABNORMAL /HPF
BASOPHILS # BLD AUTO: 0.04 10*3/MM3 (ref 0–0.2)
BASOPHILS NFR BLD AUTO: 0.3 % (ref 0–1.5)
BILIRUB SERPL-MCNC: 2.8 MG/DL (ref 0–1.2)
BILIRUB UR QL STRIP: NEGATIVE
BUN SERPL-MCNC: 13 MG/DL (ref 8–23)
BUN/CREAT SERPL: 18.8 (ref 7–25)
CALCIUM SPEC-SCNC: 8.4 MG/DL (ref 8.6–10.5)
CHLORIDE SERPL-SCNC: 89 MMOL/L (ref 98–107)
CLARITY UR: CLEAR
CO2 SERPL-SCNC: 28 MMOL/L (ref 22–29)
COLOR UR: ABNORMAL
CREAT SERPL-MCNC: 0.69 MG/DL (ref 0.57–1)
D-LACTATE SERPL-SCNC: 1.2 MMOL/L (ref 0.5–2)
DEPRECATED RDW RBC AUTO: 65.3 FL (ref 37–54)
EGFRCR SERPLBLD CKD-EPI 2021: 91.8 ML/MIN/1.73
EOSINOPHIL # BLD AUTO: 0.15 10*3/MM3 (ref 0–0.4)
EOSINOPHIL NFR BLD AUTO: 1 % (ref 0.3–6.2)
ERYTHROCYTE [DISTWIDTH] IN BLOOD BY AUTOMATED COUNT: 21.4 % (ref 12.3–15.4)
GLOBULIN UR ELPH-MCNC: 3.3 GM/DL
GLUCOSE SERPL-MCNC: 98 MG/DL (ref 65–99)
GLUCOSE UR STRIP-MCNC: NEGATIVE MG/DL
HCT VFR BLD AUTO: 35.9 % (ref 34–46.6)
HGB BLD-MCNC: 12.7 G/DL (ref 12–15.9)
HGB UR QL STRIP.AUTO: NEGATIVE
HOLD SPECIMEN: NORMAL
HOLD SPECIMEN: NORMAL
HYALINE CASTS UR QL AUTO: ABNORMAL /LPF
IMM GRANULOCYTES # BLD AUTO: 0.17 10*3/MM3 (ref 0–0.05)
IMM GRANULOCYTES NFR BLD AUTO: 1.2 % (ref 0–0.5)
KETONES UR QL STRIP: NEGATIVE
LEUKOCYTE ESTERASE UR QL STRIP.AUTO: ABNORMAL
LYMPHOCYTES # BLD AUTO: 1.5 10*3/MM3 (ref 0.7–3.1)
LYMPHOCYTES NFR BLD AUTO: 10.3 % (ref 19.6–45.3)
MAGNESIUM SERPL-MCNC: 1.6 MG/DL (ref 1.6–2.4)
MCH RBC QN AUTO: 30.6 PG (ref 26.6–33)
MCHC RBC AUTO-ENTMCNC: 35.4 G/DL (ref 31.5–35.7)
MCV RBC AUTO: 86.5 FL (ref 79–97)
MONOCYTES # BLD AUTO: 1.24 10*3/MM3 (ref 0.1–0.9)
MONOCYTES NFR BLD AUTO: 8.5 % (ref 5–12)
NEUTROPHILS NFR BLD AUTO: 11.45 10*3/MM3 (ref 1.7–7)
NEUTROPHILS NFR BLD AUTO: 78.7 % (ref 42.7–76)
NEUTS VAC BLD QL SMEAR: NORMAL
NITRITE UR QL STRIP: NEGATIVE
NRBC BLD AUTO-RTO: 0 /100 WBC (ref 0–0.2)
PH UR STRIP.AUTO: 6 [PH] (ref 5–8)
PLAT MORPH BLD: NORMAL
PLATELET # BLD AUTO: 353 10*3/MM3 (ref 140–450)
PMV BLD AUTO: 9.5 FL (ref 6–12)
POTASSIUM SERPL-SCNC: 2.5 MMOL/L (ref 3.5–5.2)
PROCALCITONIN SERPL-MCNC: 4.82 NG/ML (ref 0–0.25)
PROT SERPL-MCNC: 6.2 G/DL (ref 6–8.5)
PROT UR QL STRIP: NEGATIVE
RBC # BLD AUTO: 4.15 10*6/MM3 (ref 3.77–5.28)
RBC # UR STRIP: ABNORMAL /HPF
RBC MORPH BLD: NORMAL
REF LAB TEST METHOD: ABNORMAL
SODIUM SERPL-SCNC: 133 MMOL/L (ref 136–145)
SP GR UR STRIP: 1.01 (ref 1–1.03)
SQUAMOUS #/AREA URNS HPF: ABNORMAL /HPF
TROPONIN T SERPL HS-MCNC: 26 NG/L
UROBILINOGEN UR QL STRIP: ABNORMAL
WBC # UR STRIP: ABNORMAL /HPF
WBC NRBC COR # BLD AUTO: 14.55 10*3/MM3 (ref 3.4–10.8)
WHOLE BLOOD HOLD COAG: NORMAL
WHOLE BLOOD HOLD SPECIMEN: NORMAL

## 2024-01-08 PROCEDURE — 71045 X-RAY EXAM CHEST 1 VIEW: CPT

## 2024-01-08 PROCEDURE — 85007 BL SMEAR W/DIFF WBC COUNT: CPT

## 2024-01-08 PROCEDURE — 85025 COMPLETE CBC W/AUTO DIFF WBC: CPT

## 2024-01-08 PROCEDURE — 87086 URINE CULTURE/COLONY COUNT: CPT | Performed by: EMERGENCY MEDICINE

## 2024-01-08 PROCEDURE — 93005 ELECTROCARDIOGRAM TRACING: CPT

## 2024-01-08 PROCEDURE — 83735 ASSAY OF MAGNESIUM: CPT

## 2024-01-08 PROCEDURE — G0378 HOSPITAL OBSERVATION PER HR: HCPCS

## 2024-01-08 PROCEDURE — 87040 BLOOD CULTURE FOR BACTERIA: CPT | Performed by: EMERGENCY MEDICINE

## 2024-01-08 PROCEDURE — 84145 PROCALCITONIN (PCT): CPT | Performed by: EMERGENCY MEDICINE

## 2024-01-08 PROCEDURE — 80053 COMPREHEN METABOLIC PANEL: CPT

## 2024-01-08 PROCEDURE — 25810000003 SODIUM CHLORIDE 0.9 % SOLUTION: Performed by: EMERGENCY MEDICINE

## 2024-01-08 PROCEDURE — 81001 URINALYSIS AUTO W/SCOPE: CPT

## 2024-01-08 PROCEDURE — 99285 EMERGENCY DEPT VISIT HI MDM: CPT

## 2024-01-08 PROCEDURE — 83605 ASSAY OF LACTIC ACID: CPT | Performed by: EMERGENCY MEDICINE

## 2024-01-08 PROCEDURE — 36415 COLL VENOUS BLD VENIPUNCTURE: CPT | Performed by: EMERGENCY MEDICINE

## 2024-01-08 PROCEDURE — 74176 CT ABD & PELVIS W/O CONTRAST: CPT

## 2024-01-08 PROCEDURE — 25010000002 KETOROLAC TROMETHAMINE PER 15 MG: Performed by: EMERGENCY MEDICINE

## 2024-01-08 PROCEDURE — 25010000002 POTASSIUM CHLORIDE 10 MEQ/100ML SOLUTION: Performed by: EMERGENCY MEDICINE

## 2024-01-08 PROCEDURE — 84484 ASSAY OF TROPONIN QUANT: CPT

## 2024-01-08 RX ORDER — POTASSIUM CHLORIDE 7.45 MG/ML
10 INJECTION INTRAVENOUS
Status: DISPENSED | OUTPATIENT
Start: 2024-01-08 | End: 2024-01-08

## 2024-01-08 RX ORDER — POTASSIUM CHLORIDE 1.5 G/1.58G
40 POWDER, FOR SOLUTION ORAL ONCE
Status: COMPLETED | OUTPATIENT
Start: 2024-01-08 | End: 2024-01-08

## 2024-01-08 RX ORDER — HYDROCODONE BITARTRATE AND ACETAMINOPHEN 5; 325 MG/1; MG/1
1 TABLET ORAL ONCE
Status: COMPLETED | OUTPATIENT
Start: 2024-01-08 | End: 2024-01-08

## 2024-01-08 RX ORDER — CEFTRIAXONE SODIUM 1 G/50ML
1000 INJECTION, SOLUTION INTRAVENOUS ONCE
Status: DISCONTINUED | OUTPATIENT
Start: 2024-01-08 | End: 2024-01-09

## 2024-01-08 RX ORDER — SODIUM CHLORIDE 0.9 % (FLUSH) 0.9 %
10 SYRINGE (ML) INJECTION AS NEEDED
Status: DISCONTINUED | OUTPATIENT
Start: 2024-01-08 | End: 2024-01-19 | Stop reason: HOSPADM

## 2024-01-08 RX ORDER — KETOROLAC TROMETHAMINE 15 MG/ML
15 INJECTION, SOLUTION INTRAMUSCULAR; INTRAVENOUS ONCE
Status: COMPLETED | OUTPATIENT
Start: 2024-01-08 | End: 2024-01-08

## 2024-01-08 RX ADMIN — POTASSIUM CHLORIDE 40 MEQ: 1.5 POWDER, FOR SOLUTION ORAL at 18:35

## 2024-01-08 RX ADMIN — KETOROLAC TROMETHAMINE 15 MG: 15 INJECTION, SOLUTION INTRAMUSCULAR; INTRAVENOUS at 21:58

## 2024-01-08 RX ADMIN — HYDROCODONE BITARTRATE AND ACETAMINOPHEN 1 TABLET: 5; 325 TABLET ORAL at 21:57

## 2024-01-09 PROBLEM — G72.3 PERIODIC PARALYSIS: Status: ACTIVE | Noted: 2024-01-09

## 2024-01-09 LAB
ALBUMIN SERPL-MCNC: 1.9 G/DL (ref 3.5–5.2)
ALBUMIN/GLOB SERPL: 0.7 G/DL
ALP SERPL-CCNC: 142 U/L (ref 39–117)
ALT SERPL W P-5'-P-CCNC: 48 U/L (ref 1–33)
ANION GAP SERPL CALCULATED.3IONS-SCNC: 13.5 MMOL/L (ref 5–15)
ANISOCYTOSIS BLD QL: NORMAL
AST SERPL-CCNC: 73 U/L (ref 1–32)
BACTERIA SPEC AEROBE CULT: NORMAL
BASOPHILS # BLD AUTO: 0.04 10*3/MM3 (ref 0–0.2)
BASOPHILS NFR BLD AUTO: 0.4 % (ref 0–1.5)
BILIRUB SERPL-MCNC: 1.7 MG/DL (ref 0–1.2)
BUN SERPL-MCNC: 12 MG/DL (ref 8–23)
BUN/CREAT SERPL: 19.4 (ref 7–25)
CALCIUM SPEC-SCNC: 7.5 MG/DL (ref 8.6–10.5)
CHLORIDE SERPL-SCNC: 94 MMOL/L (ref 98–107)
CO2 SERPL-SCNC: 22.5 MMOL/L (ref 22–29)
CREAT SERPL-MCNC: 0.62 MG/DL (ref 0.57–1)
DEPRECATED RDW RBC AUTO: 71.7 FL (ref 37–54)
EGFRCR SERPLBLD CKD-EPI 2021: 94.2 ML/MIN/1.73
EOSINOPHIL # BLD AUTO: 0.08 10*3/MM3 (ref 0–0.4)
EOSINOPHIL NFR BLD AUTO: 0.9 % (ref 0.3–6.2)
ERYTHROCYTE [DISTWIDTH] IN BLOOD BY AUTOMATED COUNT: 22.5 % (ref 12.3–15.4)
GLOBULIN UR ELPH-MCNC: 2.6 GM/DL
GLUCOSE SERPL-MCNC: 70 MG/DL (ref 65–99)
HCT VFR BLD AUTO: 31.1 % (ref 34–46.6)
HGB BLD-MCNC: 10.6 G/DL (ref 12–15.9)
HYPOCHROMIA BLD QL: NORMAL
IMM GRANULOCYTES # BLD AUTO: 0.13 10*3/MM3 (ref 0–0.05)
IMM GRANULOCYTES NFR BLD AUTO: 1.4 % (ref 0–0.5)
LYMPHOCYTES # BLD AUTO: 1.7 10*3/MM3 (ref 0.7–3.1)
LYMPHOCYTES NFR BLD AUTO: 18.6 % (ref 19.6–45.3)
MACROCYTES BLD QL SMEAR: NORMAL
MCH RBC QN AUTO: 30.8 PG (ref 26.6–33)
MCHC RBC AUTO-ENTMCNC: 34.1 G/DL (ref 31.5–35.7)
MCV RBC AUTO: 90.4 FL (ref 79–97)
MONOCYTES # BLD AUTO: 1.34 10*3/MM3 (ref 0.1–0.9)
MONOCYTES NFR BLD AUTO: 14.6 % (ref 5–12)
NEUTROPHILS NFR BLD AUTO: 5.87 10*3/MM3 (ref 1.7–7)
NEUTROPHILS NFR BLD AUTO: 64.1 % (ref 42.7–76)
NRBC BLD AUTO-RTO: 0 /100 WBC (ref 0–0.2)
PLATELET # BLD AUTO: 245 10*3/MM3 (ref 140–450)
PMV BLD AUTO: 10 FL (ref 6–12)
POTASSIUM SERPL-SCNC: 3.1 MMOL/L (ref 3.5–5.2)
PROT SERPL-MCNC: 4.5 G/DL (ref 6–8.5)
QT INTERVAL: 391 MS
QTC INTERVAL: 480 MS
RBC # BLD AUTO: 3.44 10*6/MM3 (ref 3.77–5.28)
SMALL PLATELETS BLD QL SMEAR: ADEQUATE
SODIUM SERPL-SCNC: 130 MMOL/L (ref 136–145)
TARGETS BLD QL SMEAR: NORMAL
WBC MORPH BLD: NORMAL
WBC NRBC COR # BLD AUTO: 9.16 10*3/MM3 (ref 3.4–10.8)

## 2024-01-09 PROCEDURE — 85025 COMPLETE CBC W/AUTO DIFF WBC: CPT | Performed by: INTERNAL MEDICINE

## 2024-01-09 PROCEDURE — 85007 BL SMEAR W/DIFF WBC COUNT: CPT | Performed by: INTERNAL MEDICINE

## 2024-01-09 PROCEDURE — 25010000002 ENOXAPARIN PER 10 MG: Performed by: INTERNAL MEDICINE

## 2024-01-09 PROCEDURE — 25010000002 CEFTRIAXONE PER 250 MG: Performed by: INTERNAL MEDICINE

## 2024-01-09 PROCEDURE — 80053 COMPREHEN METABOLIC PANEL: CPT | Performed by: INTERNAL MEDICINE

## 2024-01-09 PROCEDURE — 25010000002 MAGNESIUM SULFATE IN D5W 1G/100ML (PREMIX) 1-5 GM/100ML-% SOLUTION: Performed by: INTERNAL MEDICINE

## 2024-01-09 RX ORDER — POLYETHYLENE GLYCOL 3350 17 G/17G
17 POWDER, FOR SOLUTION ORAL DAILY PRN
Status: DISCONTINUED | OUTPATIENT
Start: 2024-01-09 | End: 2024-01-19 | Stop reason: HOSPADM

## 2024-01-09 RX ORDER — ACETAMINOPHEN 650 MG/1
650 SUPPOSITORY RECTAL EVERY 4 HOURS PRN
Status: DISCONTINUED | OUTPATIENT
Start: 2024-01-09 | End: 2024-01-19 | Stop reason: HOSPADM

## 2024-01-09 RX ORDER — HYDROCODONE BITARTRATE AND ACETAMINOPHEN 5; 325 MG/1; MG/1
1 TABLET ORAL EVERY 4 HOURS PRN
Status: DISPENSED | OUTPATIENT
Start: 2024-01-09 | End: 2024-01-16

## 2024-01-09 RX ORDER — ONDANSETRON 2 MG/ML
4 INJECTION INTRAMUSCULAR; INTRAVENOUS EVERY 6 HOURS PRN
Status: DISCONTINUED | OUTPATIENT
Start: 2024-01-09 | End: 2024-01-19 | Stop reason: HOSPADM

## 2024-01-09 RX ORDER — ENOXAPARIN SODIUM 100 MG/ML
40 INJECTION SUBCUTANEOUS DAILY
Status: DISCONTINUED | OUTPATIENT
Start: 2024-01-09 | End: 2024-01-19 | Stop reason: HOSPADM

## 2024-01-09 RX ORDER — BISACODYL 5 MG/1
5 TABLET, DELAYED RELEASE ORAL DAILY PRN
Status: DISCONTINUED | OUTPATIENT
Start: 2024-01-09 | End: 2024-01-19 | Stop reason: HOSPADM

## 2024-01-09 RX ORDER — SODIUM CHLORIDE 0.9 % (FLUSH) 0.9 %
10 SYRINGE (ML) INJECTION EVERY 12 HOURS SCHEDULED
Status: DISCONTINUED | OUTPATIENT
Start: 2024-01-09 | End: 2024-01-19 | Stop reason: HOSPADM

## 2024-01-09 RX ORDER — AMOXICILLIN 250 MG
2 CAPSULE ORAL NIGHTLY
Status: DISCONTINUED | OUTPATIENT
Start: 2024-01-09 | End: 2024-01-19 | Stop reason: HOSPADM

## 2024-01-09 RX ORDER — ACETAMINOPHEN 160 MG/5ML
650 SOLUTION ORAL EVERY 4 HOURS PRN
Status: DISCONTINUED | OUTPATIENT
Start: 2024-01-09 | End: 2024-01-19 | Stop reason: HOSPADM

## 2024-01-09 RX ORDER — SODIUM CHLORIDE 0.9 % (FLUSH) 0.9 %
10 SYRINGE (ML) INJECTION AS NEEDED
Status: DISCONTINUED | OUTPATIENT
Start: 2024-01-09 | End: 2024-01-19 | Stop reason: HOSPADM

## 2024-01-09 RX ORDER — FAMOTIDINE 20 MG/1
20 TABLET, FILM COATED ORAL DAILY
Status: DISCONTINUED | OUTPATIENT
Start: 2024-01-09 | End: 2024-01-19 | Stop reason: HOSPADM

## 2024-01-09 RX ORDER — NITROGLYCERIN 0.4 MG/1
0.4 TABLET SUBLINGUAL
Status: DISCONTINUED | OUTPATIENT
Start: 2024-01-09 | End: 2024-01-19 | Stop reason: HOSPADM

## 2024-01-09 RX ORDER — POTASSIUM CHLORIDE 750 MG/1
20 CAPSULE, EXTENDED RELEASE ORAL 2 TIMES DAILY WITH MEALS
Status: DISCONTINUED | OUTPATIENT
Start: 2024-01-09 | End: 2024-01-10

## 2024-01-09 RX ORDER — BISACODYL 10 MG
10 SUPPOSITORY, RECTAL RECTAL DAILY PRN
Status: DISCONTINUED | OUTPATIENT
Start: 2024-01-09 | End: 2024-01-19 | Stop reason: HOSPADM

## 2024-01-09 RX ORDER — ACETAMINOPHEN 325 MG/1
650 TABLET ORAL EVERY 4 HOURS PRN
Status: DISCONTINUED | OUTPATIENT
Start: 2024-01-09 | End: 2024-01-19 | Stop reason: HOSPADM

## 2024-01-09 RX ORDER — LEVOTHYROXINE SODIUM 0.07 MG/1
75 TABLET ORAL
Status: DISCONTINUED | OUTPATIENT
Start: 2024-01-09 | End: 2024-01-19 | Stop reason: HOSPADM

## 2024-01-09 RX ORDER — AMITRIPTYLINE HYDROCHLORIDE 50 MG/1
50 TABLET, FILM COATED ORAL NIGHTLY
Status: DISCONTINUED | OUTPATIENT
Start: 2024-01-09 | End: 2024-01-19 | Stop reason: HOSPADM

## 2024-01-09 RX ORDER — ATORVASTATIN CALCIUM 20 MG/1
20 TABLET, FILM COATED ORAL NIGHTLY
Status: DISCONTINUED | OUTPATIENT
Start: 2024-01-09 | End: 2024-01-19 | Stop reason: HOSPADM

## 2024-01-09 RX ORDER — MONTELUKAST SODIUM 10 MG/1
10 TABLET ORAL NIGHTLY
Status: DISCONTINUED | OUTPATIENT
Start: 2024-01-09 | End: 2024-01-19 | Stop reason: HOSPADM

## 2024-01-09 RX ORDER — SPIRONOLACTONE 25 MG/1
25 TABLET ORAL DAILY
Status: DISCONTINUED | OUTPATIENT
Start: 2024-01-09 | End: 2024-01-10

## 2024-01-09 RX ORDER — MAGNESIUM SULFATE 1 G/100ML
1 INJECTION INTRAVENOUS ONCE
Status: COMPLETED | OUTPATIENT
Start: 2024-01-09 | End: 2024-01-09

## 2024-01-09 RX ORDER — SODIUM CHLORIDE 9 MG/ML
40 INJECTION, SOLUTION INTRAVENOUS AS NEEDED
Status: DISCONTINUED | OUTPATIENT
Start: 2024-01-09 | End: 2024-01-19 | Stop reason: HOSPADM

## 2024-01-09 RX ADMIN — HYDROCODONE BITARTRATE AND ACETAMINOPHEN 1 TABLET: 5; 325 TABLET ORAL at 11:07

## 2024-01-09 RX ADMIN — HYDROCODONE BITARTRATE AND ACETAMINOPHEN 1 TABLET: 5; 325 TABLET ORAL at 15:24

## 2024-01-09 RX ADMIN — ENOXAPARIN SODIUM 40 MG: 100 INJECTION SUBCUTANEOUS at 02:16

## 2024-01-09 RX ADMIN — FAMOTIDINE 20 MG: 20 TABLET ORAL at 09:54

## 2024-01-09 RX ADMIN — POTASSIUM CHLORIDE 20 MEQ: 10 CAPSULE, COATED, EXTENDED RELEASE ORAL at 11:06

## 2024-01-09 RX ADMIN — POTASSIUM CHLORIDE 20 MEQ: 10 CAPSULE, COATED, EXTENDED RELEASE ORAL at 18:41

## 2024-01-09 RX ADMIN — MAGNESIUM SULFATE 1 G: 1 INJECTION INTRAVENOUS at 02:16

## 2024-01-09 RX ADMIN — MONTELUKAST 10 MG: 10 TABLET, FILM COATED ORAL at 02:16

## 2024-01-09 RX ADMIN — AMITRIPTYLINE HYDROCHLORIDE 50 MG: 50 TABLET, FILM COATED ORAL at 02:16

## 2024-01-09 RX ADMIN — CEFTRIAXONE SODIUM 2000 MG: 2 INJECTION, POWDER, FOR SOLUTION INTRAMUSCULAR; INTRAVENOUS at 11:06

## 2024-01-09 RX ADMIN — Medication 10 ML: at 09:54

## 2024-01-09 RX ADMIN — SPIRONOLACTONE 25 MG: 25 TABLET ORAL at 09:54

## 2024-01-09 RX ADMIN — LEVOTHYROXINE SODIUM 75 MCG: 75 TABLET ORAL at 06:17

## 2024-01-09 RX ADMIN — AMITRIPTYLINE HYDROCHLORIDE 50 MG: 50 TABLET, FILM COATED ORAL at 20:51

## 2024-01-09 RX ADMIN — ATORVASTATIN CALCIUM 20 MG: 20 TABLET, FILM COATED ORAL at 20:51

## 2024-01-09 RX ADMIN — HYDROCODONE BITARTRATE AND ACETAMINOPHEN 1 TABLET: 5; 325 TABLET ORAL at 20:55

## 2024-01-09 RX ADMIN — Medication 10 ML: at 02:17

## 2024-01-09 RX ADMIN — MONTELUKAST 10 MG: 10 TABLET, FILM COATED ORAL at 20:51

## 2024-01-09 NOTE — SIGNIFICANT NOTE
Wound Eval / Progress Noted     Michael     Patient Name: Lona Rodríguez  : 1950  MRN: [id number]  Today's Date: 2024                 Admit Date: 2024    Visit Dx:    ICD-10-CM ICD-9-CM   1. Acute hypokalemia  E87.6 276.8   2. Hypokalemic periodic paralysis  G72.3 359.3   3. Elevated LFTs  R79.89 790.6   4. Acute UTI (urinary tract infection)  N39.0 599.0         Hypokalemia        Past Medical History:   Diagnosis Date    AMD (age related macular degeneration)     Anemia Following surgery    Arthritis     Asthma     Breast cancer Left breast.    Breast mass 2 lumpectomies 1 cancerous 1 benign    CHF (congestive heart failure) Just getting evaluation    Colon polyp Removed during colonos    COPD (chronic obstructive pulmonary disease)     Coronary artery disease Chf    Deep vein thrombosis 1979    Fibrocystic breast Left breast    Fibromyalgia     History of transfusion Aug 2019 2 units    Hyperlipidemia     Hypertension     Nausea and vomiting 2023    Obesity     Polymyalgia arteritica     Postoperative wound infection Lymph node resection site,5th toe left foot infection after neuroma surgery. 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    Sciatica         Past Surgical History:   Procedure Laterality Date    BREAST BIOPSY  Left    BREAST SURGERY  Lumpectomies left breast    ENDOSCOPY N/A 2023    Procedure: ESOPHAGOGASTRODUODENOSCOPY WITH BIOPSIES;  Surgeon: Maximino Aceves MD;  Location: [facility] ENDOSCOPY;  Service: Gastroenterology;  Laterality: N/A;  ESOPHAGITIS, GASTRITIS, HIATAL HERNIA    EYE SURGERY  Lens implants both eyes    REPLACEMENT TOTAL KNEE      TONSILLECTOMY           Physical Assessment:  Wound 24 0120 Left gluteal (Active)   Wound Image   24 1008   Dressing Appearance open to air 24 1008   Closure None 24 1008   Base moist;red;blanchable 24 1008   Periwound dry;intact;pink 24 1008   Periwound Temperature warm 24 1008   Periwound Skin Turgor soft  01/09/24 1008   Edges open 01/09/24 1008   Drainage Characteristics/Odor serosanguineous 01/09/24 1008   Drainage Amount small 01/09/24 1008   Care, Wound cleansed with;sterile normal saline 01/09/24 1008   Dressing Care dressing applied;petroleum-based;non-adherent;gauze;silicone;border dressing 01/09/24 1008   Periwound Care absorptive dressing applied 01/09/24 1008       Wound 01/09/24 1008 Left anterior hip Pressure Injury (Active)   Wound Image   01/09/24 1008   Pressure Injury Stage DTPI 01/09/24 1008   Dressing Appearance open to air 01/09/24 1008   Closure None 01/09/24 1008   Base maroon/purple;dry;non-blanchable 01/09/24 1008   Periwound dry;intact 01/09/24 1008   Periwound Temperature warm 01/09/24 1008   Periwound Skin Turgor soft 01/09/24 1008   Edges rolled/closed 01/09/24 1008   Drainage Amount none 01/09/24 1008   Care, Wound cleansed with;sterile normal saline;barrier applied 01/09/24 1008   Dressing Care open to air 01/09/24 1008   Periwound Care barrier ointment applied 01/09/24 1008       Wound 01/09/24 1008 Right anterior hip Pressure Injury (Active)   Wound Image   01/09/24 1008   Pressure Injury Stage DTPI 01/09/24 1008   Dressing Appearance open to air 01/09/24 1008   Closure None 01/09/24 1008   Base maroon/purple;dry;non-blanchable 01/09/24 1008   Periwound dry;intact 01/09/24 1008   Periwound Temperature warm 01/09/24 1008   Periwound Skin Turgor soft 01/09/24 1008   Edges rolled/closed 01/09/24 1008   Drainage Amount none 01/09/24 1008   Care, Wound cleansed with;sterile normal saline;barrier applied 01/09/24 1008   Dressing Care open to air 01/09/24 1008   Periwound Care barrier ointment applied 01/09/24 1008       Wound 01/09/24 1008 Left anterior foot Pressure Injury (Active)   Wound Image   01/09/24 1008   Pressure Injury Stage DTPI 01/09/24 1008   Dressing Appearance open to air 01/09/24 1008   Closure None 01/09/24 1008   Base maroon/purple;dry;non-blanchable 01/09/24 1008    Periwound dry;intact 01/09/24 1008   Periwound Temperature warm 01/09/24 1008   Periwound Skin Turgor soft 01/09/24 1008   Edges rolled/closed 01/09/24 1008   Drainage Amount none 01/09/24 1008   Care, Wound cleansed with;sterile normal saline;barrier applied 01/09/24 1008   Dressing Care open to air 01/09/24 1008   Periwound Care barrier ointment applied 01/09/24 1008       Wound 01/09/24 1008 Right gluteal Pressure Injury (Active)   Pressure Injury Stage 2 01/09/24 1008   Dressing Appearance open to air 01/09/24 1008   Closure None 01/09/24 1008   Base dry;pink 01/09/24 1008   Periwound blistered;other (see comments);non-blanchable 01/09/24 1008   Periwound Temperature warm 01/09/24 1008   Periwound Skin Turgor soft 01/09/24 1008   Edges rolled/closed 01/09/24 1008   Drainage Amount none 01/09/24 1008   Care, Wound cleansed with;sterile normal saline 01/09/24 1008   Dressing Care dressing applied;petroleum-based;non-adherent;gauze;silicone;border dressing 01/09/24 1008   Periwound Care absorptive dressing applied 01/09/24 1008       Wound 01/09/24 1008 Right posterior thigh Pressure Injury (Active)   Wound Image   01/09/24 1008   Pressure Injury Stage DTPI 01/09/24 1008   Dressing Appearance open to air 01/09/24 1008   Closure None 01/09/24 1008   Base maroon/purple;dry;non-blanchable 01/09/24 1008   Periwound dry;intact;pink 01/09/24 1008   Periwound Temperature warm 01/09/24 1008   Periwound Skin Turgor soft 01/09/24 1008   Edges rolled/closed 01/09/24 1008   Drainage Amount none 01/09/24 1008   Care, Wound cleansed with;sterile normal saline;barrier applied 01/09/24 1008   Dressing Care open to air 01/09/24 1008   Periwound Care barrier ointment applied 01/09/24 1008      Wound Check / Follow-up: Patient seen today for wound consult. Patient is awake and alert at time of visit. She reports that prior to admission, she had laid in one position for nearly 24 hours before she called EMS to transport her to the  hospital.     Deep tissue injuries noted to left anterior foot, left hip, right hip, and right posterior thigh. All areas with intact non-blanchable maroon / purple tissue. No open tissue or drainage noted. Cleansed with normal saline and gauze. Recommending skin care / skin protection with application of barrier cream.    Stage II pressure injury noted to right gluteal aspect with intact serous filled blistering. There is linear deep tissue injury noted to periwound tissue. Additionally, there is superficial tissue loss to bilateral gluteal aspects with moist, red, blanchable wound bases. Cleansed all areas with normal saline and gauze. Recommending daily dressing changes with non-adherent petroleum based gauze and silicone border dressing.    Skin folds are pink and dry. External female catheter in place for bladder management.    Recommending to implement every two hour turns, offload heels, keep patient free from moisture.       Impression: Deep tissue injuries. Stage II pressure injury. MASD, friction / shearing.      Short term goals: Regain skin integrity, skin protection, moisture prevention, pressure reduction, skin care, daily dressing changes.      Kylah Romero RN    1/9/2024    15:58 EST

## 2024-01-09 NOTE — ED NOTES
Received a call from CT saying that Patient's IV infiltrated a second time in CT when flushing line before contrast was given. Provider notified. IV removed upon return to room. Warm compress applied.

## 2024-01-09 NOTE — ED NOTES
Patient stated that it was painful to flush IV while at CT. CT contrast not given. Patient returned to room. IV removed.

## 2024-01-09 NOTE — PLAN OF CARE
Goal Outcome Evaluation: Medicated for pain twice this shift. Wound care done by wound care nurse. BLE elevated on pillow in bed.

## 2024-01-09 NOTE — ED PROVIDER NOTES
Time: 11:08 PM EST  Date of encounter:  1/8/2024  Independent Historian/Clinical History and Information was obtained by:   Patient    History is limited by: N/A    Chief Complaint: Weakness and unable to move earlier today.      History of Present Illness:  Patient is a 73 y.o. year old female with previous history of hypokalemia who presents to the emergency department for evaluation of episode of generalized weakness where she was unable to move or even get out of bed earlier.    Finally she was able to move some but still feels generally weak all over, but denies any one-sided weakness or numbness.    She denies any vomiting or diarrhea recently and no diuretic medications reported.    She does have history of hypokalemia and states this feels similar today.        HPI    Patient Care Team  Primary Care Provider: Christopher Thao MD    Past Medical History:     Allergies   Allergen Reactions    Morphine Anaphylaxis     Past Medical History:   Diagnosis Date    AMD (age related macular degeneration)     Anemia Following surgery    Arthritis     Asthma     Breast cancer Left breast.    Breast mass 2 lumpectomies 1 cancerous 1 benign    CHF (congestive heart failure) Just getting evaluation    Colon polyp Removed during colonos    COPD (chronic obstructive pulmonary disease)     Coronary artery disease Chf    Deep vein thrombosis 1979    Fibrocystic breast Left breast    Fibromyalgia     History of transfusion Aug 2019 2 units    Hyperlipidemia     Hypertension     Nausea and vomiting 11/29/2023    Obesity     Polymyalgia arteritica     Postoperative wound infection Lymph node resection site,5th toe left foot infection after neuroma surgery. 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    Sciatica      Past Surgical History:   Procedure Laterality Date    BREAST BIOPSY  Left    BREAST SURGERY  Lumpectomies left breast    ENDOSCOPY N/A 12/2/2023    Procedure: ESOPHAGOGASTRODUODENOSCOPY WITH BIOPSIES;  Surgeon: Maximino Aceves MD;   Location: [facility] ENDOSCOPY;  Service: Gastroenterology;  Laterality: N/A;  ESOPHAGITIS, GASTRITIS, HIATAL HERNIA    EYE SURGERY  Lens implants both eyes    REPLACEMENT TOTAL KNEE      TONSILLECTOMY       Family History   Problem Relation Age of Onset    Arthritis Mother     Cancer Mother     Heart disease Mother     Arthritis Father     COPD Father     Arthritis Brother     Diabetes Brother     Learning disabilities Brother         Dyslexic    Mental illness Maternal Aunt         Schizophrenic       Home Medications:  Prior to Admission medications    Medication Sig Start Date End Date Taking? Authorizing Provider   acetaminophen (Tylenol) 325 MG tablet Take 2 tablets by mouth Every 6 (Six) Hours As Needed for Mild Pain.    Ciara Mcgarry MD   amitriptyline (ELAVIL) 50 MG tablet Take 1 tablet by mouth Every Night.    Ciara Mcgarry MD   atorvastatin (LIPITOR) 20 MG tablet Take 1 tablet by mouth Daily.    Ciara Mcgarry MD   HYDROcodone-acetaminophen (NORCO) 7.5-325 MG per tablet Take 1 tablet by mouth Every 6 (Six) Hours. 4/20/23   Ciara Mcgarry MD   levothyroxine (SYNTHROID, LEVOTHROID) 75 MCG tablet Take 1 tablet by mouth Daily.    Ciara Mcgarry MD   methocarbamol (ROBAXIN) 500 MG tablet Take 1 tablet by mouth Every 12 (Twelve) Hours. 4/20/23   Ciara Mcgarry MD   metoclopramide (REGLAN) 5 MG tablet Take 1 tablet by mouth 4 (Four) Times a Day. 11/21/23   Ciara Mcgrary MD   montelukast (SINGULAIR) 10 MG tablet Take 1 tablet by mouth Every Night.    Ciara Mcgarry MD   ondansetron (ZOFRAN) 4 MG tablet TAKE ONE TABLET BY MOUTH EVERY 8 HOURS for 7 days 12/7/23   Ciara Mcgarry MD   spironolactone (ALDACTONE) 25 MG tablet Take 1 tablet by mouth Daily for 30 days. 12/6/23 1/5/24  Luiz Diana MD   vitamin D (ERGOCALCIFEROL) 1.25 MG (26809 UT) capsule capsule Take 1 capsule by mouth 2 (Two) Times a Week. Monday and Wednesday    Ciara Mcgarry MD     "    Social History:   Social History     Tobacco Use    Smoking status: Never    Smokeless tobacco: Never    Tobacco comments:     Grew up with [id number]   Vaping Use    Vaping Use: Never used   Substance Use Topics    Alcohol use: Never    Drug use: Never         Review of Systems:  Review of Systems   I performed a 10 point review of systems which was all negative, except for the positives found in the HPI above.  Physical Exam:  /62   Pulse 96   Temp 98 °F (36.7 °C) (Oral)   Resp 22   Ht 168.9 cm (66.5\")   Wt 93.8 kg (206 lb 12.7 oz)   SpO2 100%   BMI 32.88 kg/m²     Physical Exam   General: Awake alert and in no obvious distress, appears mildly weak but no signs of stroke noted on exam.    HEENT: Head normocephalic atraumatic, eyes PERRLA EOMI, nose normal, oropharynx normal.  He does membranes look somewhat dry.    Neck: Supple full range of motion, no meningismus, no lymphadenopathy    Heart: Regular rate and rhythm, no murmurs or rubs, 2+ radial pulses bilaterally    Lungs: Clear to auscultation bilaterally without wheezes or crackles, no respiratory distress    Abdomen: Soft, nontender, nondistended, no rebound or guarding    Skin: Warm, dry, no rash    Musculoskeletal: Normal range of motion, no lower extremity edema    Neurologic: Oriented x3, no motor deficits no sensory deficits    Psychiatric: Mood appears stable, no psychosis          Procedures:  Procedures      Medical Decision Making:      Comorbidities that affect care:    History of hypokalemia    External Notes reviewed:    Previous Labs: I compared today's lab work to previous labs and it looks like she has a new acute rise in her LFTs and also some elevated white blood cell count.      The following orders were placed and all results were independently analyzed by me:  Orders Placed This Encounter   Procedures    Blood Culture - Blood,    Blood Culture - Blood,    Urine Culture - Urine,    XR Chest 1 View    CT Abdomen Pelvis Without " Contrast    [place] Draw    Comprehensive Metabolic Panel    Single High Sensitivity Troponin T    Magnesium    Urinalysis With Microscopic If Indicated (No Culture) - Urine, Clean Catch    CBC Auto Differential    Scan Slide    Urinalysis, Microscopic Only - Urine, Clean Catch    Lactic Acid, Plasma    Procalcitonin    NPO Diet NPO Type: Strict NPO    Undress & Gown    Continuous Pulse Oximetry    Vital Signs    Orthostatic Blood Pressure    Code Status and Medical Interventions:    Internal Medicine (on-call MD unless specified)    Oxygen Therapy- Nasal Cannula; Titrate 1-6 LPM Per SpO2; 90 - 95%    POC Glucose Once    ECG 12 Lead ED Triage Standing Order; Weak / Dizzy / AMS    Insert Peripheral IV    Initiate Observation Status    Fall Precautions    CBC & Differential    Green Top (Gel)    Lavender Top    Gold Top - SST    Light Blue Top       Medications Given in the Emergency Department:  Medications   sodium chloride 0.9 % flush 10 mL (has no administration in time range)   potassium chloride 10 mEq in 100 mL IVPB (0 mEq Intravenous Hold 1/8/24 1834)   sodium chloride 0.9 % bolus 1,000 mL (0 mL Intravenous Hold 1/8/24 1835)   cefTRIAXone (ROCEPHIN) IVPB 1,000 mg (has no administration in time range)   potassium chloride (KLOR-CON) packet 40 mEq (40 mEq Oral Given 1/8/24 1835)   HYDROcodone-acetaminophen (NORCO) 5-325 MG per tablet 1 tablet (1 tablet Oral Given 1/8/24 2157)   ketorolac (TORADOL) injection 15 mg (15 mg Intravenous Given 1/8/24 2158)        ED Course:    ED Course as of 01/08/24 2312   Mon Jan 08, 2024   1758 EKG: I interpreted her twelve-lead EKG is normal sinus rhythm at 90 bpm, normal P waves, QRS showing inferior and anteroseptal Q waves as well as incomplete left bundle branch block and LAFB, normal T waves. [VS]      ED Course User Index  [VS] Leander Irvin MD       Labs:    Lab Results (last 24 hours)       Procedure Component Value Units Date/Time    CBC & Differential [[id number]]   (Abnormal) Collected: 01/08/24 1512    Specimen: Blood Updated: 01/08/24 1539    Narrative:      The following orders were created for panel order CBC & Differential.  Procedure                               Abnormality         Status                     ---------                               -----------         ------                     CBC Auto Differential[[id number]]        Abnormal            Final result               Scan Slide[[id number]]                                       Final result                 Please view results for these tests on the individual orders.    Comprehensive Metabolic Panel [[id number]]  (Abnormal) Collected: 01/08/24 1512    Specimen: Blood Updated: 01/08/24 1600     Glucose 98 mg/dL      BUN 13 mg/dL      Creatinine 0.69 mg/dL      Sodium 133 mmol/L      Potassium 2.5 mmol/L      Comment: Slight hemolysis detected by analyzer. Result may be falsely elevated.        Chloride 89 mmol/L      CO2 28.0 mmol/L      Calcium 8.4 mg/dL      Total Protein 6.2 g/dL      Albumin 2.9 g/dL      ALT (SGPT) 79 U/L      AST (SGOT) 118 U/L      Alkaline Phosphatase 192 U/L      Total Bilirubin 2.8 mg/dL      Globulin 3.3 gm/dL      A/G Ratio 0.9 g/dL      BUN/Creatinine Ratio 18.8     Anion Gap 16.0 mmol/L      eGFR 91.8 mL/min/1.73     Narrative:      GFR Normal >60  Chronic Kidney Disease <60  Kidney Failure <15    The GFR formula is only valid for adults with stable renal function between ages 18 and 70.    Single High Sensitivity Troponin T [[id number]]  (Abnormal) Collected: 01/08/24 1512    Specimen: Blood Updated: 01/08/24 1543     HS Troponin T 26 ng/L     Narrative:      High Sensitive Troponin T Reference Range:  <14.0 ng/L- Negative Female for AMI  <22.0 ng/L- Negative Male for AMI  >=14 - Abnormal Female indicating possible myocardial injury.  >=22 - Abnormal Male indicating possible myocardial injury.   Clinicians would have to utilize clinical acumen, EKG, Troponin, and serial changes to  determine if it is an Acute Myocardial Infarction or myocardial injury due to an underlying chronic condition.         Magnesium [[id number]]  (Normal) Collected: 01/08/24 1512    Specimen: Blood Updated: 01/08/24 1600     Magnesium 1.6 mg/dL     CBC Auto Differential [[id number]]  (Abnormal) Collected: 01/08/24 1512    Specimen: Blood Updated: 01/08/24 1520     WBC 14.55 10*3/mm3      RBC 4.15 10*6/mm3      Hemoglobin 12.7 g/dL      Hematocrit 35.9 %      MCV 86.5 fL      MCH 30.6 pg      MCHC 35.4 g/dL      RDW 21.4 %      RDW-SD 65.3 fl      MPV 9.5 fL      Platelets 353 10*3/mm3      Neutrophil % 78.7 %      Lymphocyte % 10.3 %      Monocyte % 8.5 %      Eosinophil % 1.0 %      Basophil % 0.3 %      Immature Grans % 1.2 %      Neutrophils, Absolute 11.45 10*3/mm3      Lymphocytes, Absolute 1.50 10*3/mm3      Monocytes, Absolute 1.24 10*3/mm3      Eosinophils, Absolute 0.15 10*3/mm3      Basophils, Absolute 0.04 10*3/mm3      Immature Grans, Absolute 0.17 10*3/mm3      nRBC 0.0 /100 WBC     Scan Slide [[id number]] Collected: 01/08/24 1512    Specimen: Blood Updated: 01/08/24 1539     RBC Morphology Normal     Vacuolated Neutrophils Slight/1+     Platelet Morphology Normal    Urinalysis With Microscopic If Indicated (No Culture) - Urine, Clean Catch [[id number]]  (Abnormal) Collected: 01/08/24 1917    Specimen: Urine, Clean Catch Updated: 01/08/24 1931     Color, UA Dark Yellow     Appearance, UA Clear     pH, UA 6.0     Specific Gravity, UA 1.015     Glucose, UA Negative     Ketones, UA Negative     Bilirubin, UA Negative     Blood, UA Negative     Protein, UA Negative     Leuk Esterase, UA Moderate (2+)     Nitrite, UA Negative     Urobilinogen, UA 2.0 E.U./dL    Urinalysis, Microscopic Only - Urine, Clean Catch [[id number]]  (Abnormal) Collected: 01/08/24 1917    Specimen: Urine, Clean Catch Updated: 01/08/24 1931     RBC, UA 3-5 /HPF      WBC, UA 11-20 /HPF      Bacteria, UA None Seen /HPF      Squamous Epithelial  "Cells, UA 0-2 /HPF      Hyaline Casts, UA 3-6 /LPF      Methodology Automated Microscopy    Urine Culture - Urine, Urine, Clean Catch [[id number]] Collected: 01/08/24 1917    Specimen: Urine, Clean Catch Updated: 01/08/24 1942    Lactic Acid, Plasma [[id number]]  (Normal) Collected: 01/08/24 2145    Specimen: Blood Updated: 01/08/24 2208     Lactate 1.2 mmol/L     Procalcitonin [[id number]]  (Abnormal) Collected: 01/08/24 2145    Specimen: Blood Updated: 01/08/24 2214     Procalcitonin 4.82 ng/mL     Narrative:      As a Marker for Sepsis (Non-Neonates):    1. <0.5 ng/mL represents a low risk of severe sepsis and/or septic shock.  2. >2 ng/mL represents a high risk of severe sepsis and/or septic shock.    As a Marker for Lower Respiratory Tract Infections that require antibiotic therapy:    PCT on Admission    Antibiotic Therapy       6-12 Hrs later    >0.5                Strongly Recommended  >0.25 - <0.5        Recommended  0.1 - 0.25          Discouraged              Remeasure/reassess PCT  <0.1                Strongly Discouraged     Remeasure/reassess PCT    As 28 day mortality risk marker: \"Change in Procalcitonin Result\" (>80% or <=80%) if Day 0 (or Day 1) and Day 4 values are available. Refer to http://www.[employer]-pct-calculator.com    Change in PCT <=80%  A decrease of PCT levels below or equal to 80% defines a positive change in PCT test result representing a higher risk for 28-day all-cause mortality of patients diagnosed with severe sepsis for septic shock.    Change in PCT >80%  A decrease of PCT levels of more than 80% defines a negative change in PCT result representing a lower risk for 28-day all-cause mortality of patients diagnosed with severe sepsis or septic shock.    This test is Prognostic not Diagnostic, if elevated correlate with clinical findings before administering antibiotic treatment.        Blood Culture - Blood, Chest, Left [[id number]] Collected: 01/08/24 2229    Specimen: Blood from " Chest, Left Updated: 01/08/24 2246    Blood Culture - Blood, Chest, Left [[id number]] Collected: 01/08/24 2229    Specimen: Blood from Chest, Left Updated: 01/08/24 2246             Imaging:    CT Abdomen Pelvis Without Contrast    Result Date: 1/8/2024  PROCEDURE: CT ABDOMEN PELVIS WO CONTRAST  COMPARISON: [facility], CT, CT ABDOMEN PELVIS W CONTRAST, 12/02/2023, 17:47.  INDICATIONS: Abd pain diarrhea, waekness  TECHNIQUE: CT images were created without intravenous contrast.   PROTOCOL:   Standard imaging protocol performed    RADIATION:   DLP: 732.4mGy*cm   Automated exposure control was utilized to minimize radiation dose.  FINDINGS:    Lung bases:  Chronic pleural thickening and calcification at the left base.  Subpleural opacities noted.  Minimal scattered tree-in-bud opacities noted at the right lung base likely post inflammatory.  There is no free air noted below the diaphragm.  Organs:  Pancreas demonstrates diffuse fatty atrophy.  Spleen, kidneys and adrenal glands are unremarkable in their appearance.  The liver is diffusely decreased in attenuation compatible with hepatic steatosis.  GI tract:  Evaluation of the GI tract is somewhat motion compromised.  Colon demonstrates diverticulosis without definite evidence of acute diverticulitis.  Small bowel demonstrates some scattered air and fluid-filled loops without evidence of obstruction or focal wall thickening.  Stomach demonstrates a moderate hiatal hernia.  No suspicious fluid collections noted within the mesentery.  No suspicious adenopathy  Pelvis:  Limited noncontrast imaging the urinary bladder is unremarkable.  The uterus is grossly unremarkable.  Multi septated cystic appearance of the right ovary again noted measuring approximately 5.4 by 5.0 cm.  Left ovary is not well visualized.  No suspicious pelvic adenopathy or fluid collection noted  Retroperitoneum:  Atherosclerotic changes are noted of the aorta which is normal in caliber.   There is no suspicious retroperitoneal adenopathy  Bones and soft tissues:  Remote, healed inferior pubic ramus fracture on the left.  Multilevel degenerative changes are noted of the spine.        1. Fluid-filled loops and scattered air-fluid levels of the small bowel suggest a nonspecific enteritis.  No evidence of obstruction 2. Diffuse hepatic steatosis please correlate with liver function test 3. Cystic lesion right ovary.  Findings are similar given differences in technique to the comparison.  Nonemergent pelvic ultrasound recommended to evaluate if not already performed.  4. Hiatal hernia 5. Other findings as above     LUC JENSEN MD       Electronically Signed and Approved By: LUC JENSEN MD on 1/08/2024 at 21:09             XR Chest 1 View    Result Date: 1/8/2024  PROCEDURE: XR CHEST 1 VW  COMPARISON: [facility], CR, XR CHEST 1 VW, 11/28/2023, 19:59.  [facility], CT, CT CHEST W CONTRAST DIAGNOSTIC, 11/28/2023, 21:35.  INDICATIONS: Weak/Dizzy/AMS triage protocol  FINDINGS:  The heart appears enlarged, as before.  Port catheter terminates in the area of the lower SVC.  Mediastinal contour appears grossly normal.  No definite pleural effusion, pneumothorax, or significant pulmonary infiltrate.  There are degenerative changes in the thoracic spine with mild scoliosis.  There is advanced arthropathy at the left glenohumeral joint.        1. Cardiomegaly. 2. No radiographic findings of acute pulmonary abnormality.       BLANE JOHNSON MD       Electronically Signed and Approved By: BLANE JOHNSON MD on 1/08/2024 at 15:23                Differential Diagnosis and Discussion:    Weakness: Based on the patient's history, signs, and symptoms, the diffential diagnosis includes but is not limited to meningitis, stroke, sepsis, subarachnoid hemorrhage, intracranial bleeding, encephalitis, acute uti, dehydration, MS, myasthenia gravis, Guillan [place], migraine variant, neuromuscular  disorders vertigo, electrolyte imbalance, and metabolic disorders.    All labs were reviewed and interpreted by me.  All X-rays impressions were independently interpreted by me.  EKG was interpreted by me.  CT scan radiology impression was interpreted by me.    MDM     Amount and/or Complexity of Data Reviewed  Clinical lab tests: reviewed  Tests in the radiology section of CPT®: reviewed  Tests in the medicine section of CPT®: reviewed  Decide to obtain previous medical records or to obtain history from someone other than the patient: yes         This patient is a pleasant 73-year-old female presenting with diffuse weakness and was unable to get out of bed today and on arrival was found to have low potassium of 2.5.    She also looks somewhat dehydrated on exam and had low sodium and chloride so I gave her some IV saline in the ED and also some IV potassium chloride runs and oral potassium chloride in the ED, as I think she actually had an episode of periodic hypokalemic paralysis earlier at home.    In addition she had an elevated white count of 14 and UTI and I gave her ceftriaxone in the ED.    Finally, as her liver enzymes were elevated I got a CT of the abdomen but no acute findings noted.    We had significant difficulty obtaining IV access on this patient requiring finally ultrasound-guided access.      Critical Care Note: Total Critical Care time of 35 minutes. Total critical care time documented does not include time spent on separately billed procedures for services of nurses or physician assistants. I personally saw and examined the patient. I have reviewed all diagnostic interpretations and treatment plans as written. I was present for the key portions of any procedures performed and the inclusive time noted in any critical care statement. Critical care time includes patient management by me, time spent at the patients bedside,  time to review lab and imaging results, discussing patient care,  documentation in the medical record, and time spent with family or caregiver.        Patient Care Considerations:          Consultants/Shared Management Plan:    PCP: I have discussed the case with Dr. Diana, who agrees to accept the patient for admission.    Social Determinants of Health:    Patient is independent, reliable, and has access to care.       Disposition and Care Coordination:    Admit:   Through independent evaluation of the patient's history, physical, and imperical data, the patient meets criteria for observation/admission to the hospital.        Final diagnoses:   Acute hypokalemia   Hypokalemic periodic paralysis   Elevated LFTs   Acute UTI (urinary tract infection)        ED Disposition       ED Disposition   Decision to Admit    Condition   --    Comment   Level of Care: Telemetry [5]   Diagnosis: Hypokalemia [[id number]]   Admitting Physician: LUDY DIANA [[id number]]   Attending Physician: LUDY DIANA [[id number]]                 This medical record created using voice recognition software.             Leander Irvin MD  01/08/24 9048

## 2024-01-09 NOTE — H&P
[facility]   HISTORY AND PHYSICAL    Patient Name: Lona Rodríguez  : 1950  MRN: [id number]  Primary Care Physician:  Christopher Thao MD  Date of admission: 2024    Subjective   Subjective     Chief Complaint:   Fatigue and weakness inability to walk and move      HPI:    Lona Rodríguez is a 73 y.o. female with known history of hypokalemia came to emergency room with increased weakness and inability to walk and move out of bed.  Patient potassium was 2.5 in ER.  Patient admitted to medical service.  Started on IV potassium patient could not tolerate IV potassium.  Oral potassium and magnesium given.  Patient feeling somewhat better.        Review of Systems:    Fatigue and weakness.  Inability to move in bed.  Inability to walk.  No chest pain.  No shortness of breath  Denies palpitations  No nausea vomiting or diarrhea    Personal History     Past Medical History:   Diagnosis Date   • AMD (age related macular degeneration)    • Anemia Following surgery   • Arthritis    • Asthma    • Breast cancer Left breast.   • Breast mass 2 lumpectomies 1 cancerous 1 benign   • CHF (congestive heart failure) Just getting evaluation   • Colon polyp Removed during colonos   • COPD (chronic obstructive pulmonary disease)    • Coronary artery disease Chf   • Deep vein thrombosis    • Fibrocystic breast Left breast   • Fibromyalgia    • History of transfusion Aug 2019 2 units   • Hyperlipidemia    • Hypertension    • Nausea and vomiting 2023   • Obesity    • Polymyalgia arteritica    • Postoperative wound infection Lymph node resection site,5th toe left foot infection after neuroma surgery. 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   • Sciatica        Past Surgical History:   Procedure Laterality Date   • BREAST BIOPSY  Left   • BREAST SURGERY  Lumpectomies left breast   • ENDOSCOPY N/A 2023    Procedure: ESOPHAGOGASTRODUODENOSCOPY WITH BIOPSIES;  Surgeon: Maximino Aceves MD;  Location: [facility] ENDOSCOPY;  Service:  Gastroenterology;  Laterality: N/A;  ESOPHAGITIS, GASTRITIS, HIATAL HERNIA   • EYE SURGERY  Lens implants both eyes   • REPLACEMENT TOTAL KNEE     • TONSILLECTOMY         Family History: family history includes Arthritis in her brother, father, and mother; COPD in her father; Cancer in her mother; Diabetes in her brother; Heart disease in her mother; Learning disabilities in her brother; Mental illness in her maternal aunt. Otherwise pertinent FHx was reviewed and not pertinent to current issue.    Social History:  reports that she has never smoked. She has never used smokeless tobacco. She reports that she does not drink alcohol and does not use drugs.    Home Medications:  acetaminophen, amitriptyline, atorvastatin, levothyroxine, montelukast, spironolactone, and vitamin D      Allergies:  Allergies   Allergen Reactions   • Morphine Anaphylaxis       Objective   Objective     Vitals:   Temp:  [97.2 °F (36.2 °C)-98.1 °F (36.7 °C)] 98.1 °F (36.7 °C)  Heart Rate:  [] 93  Resp:  [18-20] 18  BP: (100-118)/(49-80) 106/54    Physical Exam    Elderly female not in acute distress  Heart regular  Lungs clear  Abdomen soft obese nontender  Extremities no edema  Neuro Global weakness      I have personally reviewed the results from the time of this admission to 1/9/2024 17:17 EST and agree with these findings:  [x]  Laboratory  []  Microbiology  []  Radiology  []  EKG/Telemetry   []  Cardiology/Vascular   []  Pathology  []  Old records  []  Other:    CBC:    WBC   Date Value Ref Range Status   01/09/2024 9.16 3.40 - 10.80 10*3/mm3 Final     RBC   Date Value Ref Range Status   01/09/2024 3.44 (L) 3.77 - 5.28 10*6/mm3 Final     Hemoglobin   Date Value Ref Range Status   01/09/2024 10.6 (L) 12.0 - 15.9 g/dL Final     Hematocrit   Date Value Ref Range Status   01/09/2024 31.1 (L) 34.0 - 46.6 % Final     MCV   Date Value Ref Range Status   01/09/2024 90.4 79.0 - 97.0 fL Final     MCH   Date Value Ref Range Status    01/09/2024 30.8 26.6 - 33.0 pg Final     MCHC   Date Value Ref Range Status   01/09/2024 34.1 31.5 - 35.7 g/dL Final     RDW   Date Value Ref Range Status   01/09/2024 22.5 (H) 12.3 - 15.4 % Final     RDW-SD   Date Value Ref Range Status   01/09/2024 71.7 (H) 37.0 - 54.0 fl Final     MPV   Date Value Ref Range Status   01/09/2024 10.0 6.0 - 12.0 fL Final     Platelets   Date Value Ref Range Status   01/09/2024 245 140 - 450 10*3/mm3 Final     Neutrophil %   Date Value Ref Range Status   01/09/2024 64.1 42.7 - 76.0 % Final     Lymphocyte %   Date Value Ref Range Status   01/09/2024 18.6 (L) 19.6 - 45.3 % Final     Monocyte %   Date Value Ref Range Status   01/09/2024 14.6 (H) 5.0 - 12.0 % Final     Eosinophil %   Date Value Ref Range Status   01/09/2024 0.9 0.3 - 6.2 % Final     Basophil %   Date Value Ref Range Status   01/09/2024 0.4 0.0 - 1.5 % Final     Immature Grans %   Date Value Ref Range Status   01/09/2024 1.4 (H) 0.0 - 0.5 % Final     Neutrophils, Absolute   Date Value Ref Range Status   01/09/2024 5.87 1.70 - 7.00 10*3/mm3 Final     Lymphocytes, Absolute   Date Value Ref Range Status   01/09/2024 1.70 0.70 - 3.10 10*3/mm3 Final     Monocytes, Absolute   Date Value Ref Range Status   01/09/2024 1.34 (H) 0.10 - 0.90 10*3/mm3 Final     Eosinophils, Absolute   Date Value Ref Range Status   01/09/2024 0.08 0.00 - 0.40 10*3/mm3 Final     Basophils, Absolute   Date Value Ref Range Status   01/09/2024 0.04 0.00 - 0.20 10*3/mm3 Final     Immature Grans, Absolute   Date Value Ref Range Status   01/09/2024 0.13 (H) 0.00 - 0.05 10*3/mm3 Final     nRBC   Date Value Ref Range Status   01/09/2024 0.0 0.0 - 0.2 /100 WBC Final        BMP:    Lab Results   Component Value Date    GLUCOSE 70 01/09/2024    BUN 12 01/09/2024    CREATININE 0.62 01/09/2024    BCR 19.4 01/09/2024    K 3.1 (L) 01/09/2024    CO2 22.5 01/09/2024    CALCIUM 7.5 (L) 01/09/2024    ALBUMIN 1.9 (L) 01/09/2024    LABIL2 1.4 09/24/2019    AST 73 (H)  01/09/2024    ALT 48 (H) 01/09/2024        CT Abdomen Pelvis Without Contrast    Result Date: 1/8/2024    1. Fluid-filled loops and scattered air-fluid levels of the small bowel suggest a nonspecific enteritis.  No evidence of obstruction 2. Diffuse hepatic steatosis please correlate with liver function test 3. Cystic lesion right ovary.  Findings are similar given differences in technique to the comparison.  Nonemergent pelvic ultrasound recommended to evaluate if not already performed.  4. Hiatal hernia 5. Other findings as above     LUC JENSEN MD       Electronically Signed and Approved By: LUC JENSEN MD on 1/08/2024 at 21:09             XR Chest 1 View    Result Date: 1/8/2024    1. Cardiomegaly. 2. No radiographic findings of acute pulmonary abnormality.       BLANE JOHNSON MD       Electronically Signed and Approved By: BLANE JOHNSON MD on 1/08/2024 at 15:23                     Assessment & Plan   Assessment / Plan       Current Diagnosis:  Active Hospital Problems    Diagnosis    • Periodic paralysis    • Hypokalemia      Plan:   Admit to hospital  IV and oral potassium  Magnesium replacement  Monitor electrolytes and rhythm  Increase activity with PT and OT  Repeat labs in a.m.      DVT prophylaxis:  Medical DVT prophylaxis orders are present.    GI Prophylaxis:       Pepcid    CODE STATUS:    Code Status (Patient has no pulse and is not breathing): CPR (Attempt to Resuscitate)  Medical Interventions (Patient has pulse or is breathing): Full Support    Admission Status:  I believe this patient meets inpatient status.             I have dictated this note utilizing Dragon Dictation.             Please note that portions of this note were completed with a voice recognition program.             Part of this note may be an electronic transcription/translation of spoken language to printed text         using the Dragon Dictation System.       Electronically signed by Luzi Diana MD, 01/09/24, 9:39 AM  EST.    Total time spent with in evaluation and management:

## 2024-01-09 NOTE — ED NOTES
Patient states that IV is stinging after receiving Toradol IV. Informed patient that it is a common side effect when receiving IV. Pt requested to hold IV fluids at this time.

## 2024-01-10 ENCOUNTER — TELEPHONE (OUTPATIENT)
Dept: GASTROENTEROLOGY | Facility: CLINIC | Age: 74
End: 2024-01-10
Payer: MEDICARE

## 2024-01-10 PROBLEM — N39.0 ACUTE UTI (URINARY TRACT INFECTION): Status: ACTIVE | Noted: 2024-01-10

## 2024-01-10 LAB
ALBUMIN SERPL-MCNC: 2 G/DL (ref 3.5–5.2)
ALBUMIN/GLOB SERPL: 0.9 G/DL
ALP SERPL-CCNC: 129 U/L (ref 39–117)
ALT SERPL W P-5'-P-CCNC: 41 U/L (ref 1–33)
ANION GAP SERPL CALCULATED.3IONS-SCNC: 6.5 MMOL/L (ref 5–15)
ANISOCYTOSIS BLD QL: NORMAL
AST SERPL-CCNC: 52 U/L (ref 1–32)
BASOPHILS # BLD AUTO: 0.04 10*3/MM3 (ref 0–0.2)
BASOPHILS NFR BLD AUTO: 0.5 % (ref 0–1.5)
BILIRUB SERPL-MCNC: 1 MG/DL (ref 0–1.2)
BUN SERPL-MCNC: 10 MG/DL (ref 8–23)
BUN/CREAT SERPL: 20.4 (ref 7–25)
CALCIUM SPEC-SCNC: 7.5 MG/DL (ref 8.6–10.5)
CHLORIDE SERPL-SCNC: 97 MMOL/L (ref 98–107)
CO2 SERPL-SCNC: 29.5 MMOL/L (ref 22–29)
CREAT SERPL-MCNC: 0.49 MG/DL (ref 0.57–1)
DEPRECATED RDW RBC AUTO: 69.1 FL (ref 37–54)
EGFRCR SERPLBLD CKD-EPI 2021: 99.7 ML/MIN/1.73
EOSINOPHIL # BLD AUTO: 0.18 10*3/MM3 (ref 0–0.4)
EOSINOPHIL NFR BLD AUTO: 2.4 % (ref 0.3–6.2)
ERYTHROCYTE [DISTWIDTH] IN BLOOD BY AUTOMATED COUNT: 22.2 % (ref 12.3–15.4)
GLOBULIN UR ELPH-MCNC: 2.2 GM/DL
GLUCOSE SERPL-MCNC: 89 MG/DL (ref 65–99)
HCT VFR BLD AUTO: 25.8 % (ref 34–46.6)
HGB BLD-MCNC: 8.9 G/DL (ref 12–15.9)
HYPOCHROMIA BLD QL: NORMAL
IMM GRANULOCYTES # BLD AUTO: 0.12 10*3/MM3 (ref 0–0.05)
IMM GRANULOCYTES NFR BLD AUTO: 1.6 % (ref 0–0.5)
LYMPHOCYTES # BLD AUTO: 1.28 10*3/MM3 (ref 0.7–3.1)
LYMPHOCYTES NFR BLD AUTO: 17.4 % (ref 19.6–45.3)
MACROCYTES BLD QL SMEAR: NORMAL
MCH RBC QN AUTO: 30.5 PG (ref 26.6–33)
MCHC RBC AUTO-ENTMCNC: 34.5 G/DL (ref 31.5–35.7)
MCV RBC AUTO: 88.4 FL (ref 79–97)
MONOCYTES # BLD AUTO: 0.69 10*3/MM3 (ref 0.1–0.9)
MONOCYTES NFR BLD AUTO: 9.4 % (ref 5–12)
NEUTROPHILS NFR BLD AUTO: 5.04 10*3/MM3 (ref 1.7–7)
NEUTROPHILS NFR BLD AUTO: 68.7 % (ref 42.7–76)
NRBC BLD AUTO-RTO: 0 /100 WBC (ref 0–0.2)
PLATELET # BLD AUTO: 257 10*3/MM3 (ref 140–450)
PMV BLD AUTO: 9.6 FL (ref 6–12)
POTASSIUM SERPL-SCNC: 3 MMOL/L (ref 3.5–5.2)
PROT SERPL-MCNC: 4.2 G/DL (ref 6–8.5)
RBC # BLD AUTO: 2.92 10*6/MM3 (ref 3.77–5.28)
SMALL PLATELETS BLD QL SMEAR: ADEQUATE
SODIUM SERPL-SCNC: 133 MMOL/L (ref 136–145)
TARGETS BLD QL SMEAR: NORMAL
WBC MORPH BLD: NORMAL
WBC NRBC COR # BLD AUTO: 7.35 10*3/MM3 (ref 3.4–10.8)

## 2024-01-10 PROCEDURE — 86706 HEP B SURFACE ANTIBODY: CPT | Performed by: INTERNAL MEDICINE

## 2024-01-10 PROCEDURE — 25010000002 ENOXAPARIN PER 10 MG: Performed by: INTERNAL MEDICINE

## 2024-01-10 PROCEDURE — 85025 COMPLETE CBC W/AUTO DIFF WBC: CPT | Performed by: INTERNAL MEDICINE

## 2024-01-10 PROCEDURE — 86709 HEPATITIS A IGM ANTIBODY: CPT | Performed by: INTERNAL MEDICINE

## 2024-01-10 PROCEDURE — 87340 HEPATITIS B SURFACE AG IA: CPT | Performed by: INTERNAL MEDICINE

## 2024-01-10 PROCEDURE — 85007 BL SMEAR W/DIFF WBC COUNT: CPT | Performed by: INTERNAL MEDICINE

## 2024-01-10 PROCEDURE — 97165 OT EVAL LOW COMPLEX 30 MIN: CPT

## 2024-01-10 PROCEDURE — 86705 HEP B CORE ANTIBODY IGM: CPT | Performed by: INTERNAL MEDICINE

## 2024-01-10 PROCEDURE — 86708 HEPATITIS A ANTIBODY: CPT | Performed by: INTERNAL MEDICINE

## 2024-01-10 PROCEDURE — 80053 COMPREHEN METABOLIC PANEL: CPT | Performed by: INTERNAL MEDICINE

## 2024-01-10 PROCEDURE — 86704 HEP B CORE ANTIBODY TOTAL: CPT | Performed by: INTERNAL MEDICINE

## 2024-01-10 PROCEDURE — 25010000002 CEFTRIAXONE PER 250 MG: Performed by: INTERNAL MEDICINE

## 2024-01-10 RX ORDER — METHOCARBAMOL 500 MG/1
500 TABLET, FILM COATED ORAL EVERY 12 HOURS SCHEDULED
Status: DISCONTINUED | OUTPATIENT
Start: 2024-01-10 | End: 2024-01-19 | Stop reason: HOSPADM

## 2024-01-10 RX ORDER — POTASSIUM CHLORIDE 750 MG/1
40 CAPSULE, EXTENDED RELEASE ORAL 2 TIMES DAILY WITH MEALS
Qty: 12 CAPSULE | Refills: 0 | Status: COMPLETED | OUTPATIENT
Start: 2024-01-10 | End: 2024-01-11

## 2024-01-10 RX ADMIN — Medication 10 ML: at 21:00

## 2024-01-10 RX ADMIN — FAMOTIDINE 20 MG: 20 TABLET ORAL at 10:00

## 2024-01-10 RX ADMIN — ATORVASTATIN CALCIUM 20 MG: 20 TABLET, FILM COATED ORAL at 20:59

## 2024-01-10 RX ADMIN — HYDROCODONE BITARTRATE AND ACETAMINOPHEN 1 TABLET: 5; 325 TABLET ORAL at 14:34

## 2024-01-10 RX ADMIN — ENOXAPARIN SODIUM 40 MG: 100 INJECTION SUBCUTANEOUS at 10:00

## 2024-01-10 RX ADMIN — DOCUSATE SODIUM 50MG AND SENNOSIDES 8.6MG 2 TABLET: 8.6; 5 TABLET, FILM COATED ORAL at 20:59

## 2024-01-10 RX ADMIN — HYDROCODONE BITARTRATE AND ACETAMINOPHEN 1 TABLET: 5; 325 TABLET ORAL at 10:00

## 2024-01-10 RX ADMIN — POTASSIUM CHLORIDE 40 MEQ: 10 CAPSULE, COATED, EXTENDED RELEASE ORAL at 09:59

## 2024-01-10 RX ADMIN — HYDROCODONE BITARTRATE AND ACETAMINOPHEN 1 TABLET: 5; 325 TABLET ORAL at 03:22

## 2024-01-10 RX ADMIN — MONTELUKAST 10 MG: 10 TABLET, FILM COATED ORAL at 20:59

## 2024-01-10 RX ADMIN — CEFTRIAXONE SODIUM 2000 MG: 2 INJECTION, POWDER, FOR SOLUTION INTRAMUSCULAR; INTRAVENOUS at 13:02

## 2024-01-10 RX ADMIN — HYDROCODONE BITARTRATE AND ACETAMINOPHEN 1 TABLET: 5; 325 TABLET ORAL at 20:59

## 2024-01-10 RX ADMIN — POTASSIUM CHLORIDE 40 MEQ: 10 CAPSULE, COATED, EXTENDED RELEASE ORAL at 17:43

## 2024-01-10 RX ADMIN — AMITRIPTYLINE HYDROCHLORIDE 50 MG: 50 TABLET, FILM COATED ORAL at 20:59

## 2024-01-10 RX ADMIN — LEVOTHYROXINE SODIUM 75 MCG: 75 TABLET ORAL at 05:45

## 2024-01-10 RX ADMIN — METHOCARBAMOL 500 MG: 500 TABLET ORAL at 22:25

## 2024-01-10 NOTE — THERAPY EVALUATION
Patient Name: Lona Rodríguez  : 1950    MRN: [id number]                              Today's Date: 1/10/2024       Admit Date: 2024    Visit Dx:     ICD-10-CM ICD-9-CM   1. Acute hypokalemia  E87.6 276.8   2. Hypokalemic periodic paralysis  G72.3 359.3   3. Elevated LFTs  R79.89 790.6   4. Acute UTI (urinary tract infection)  N39.0 599.0   5. Decreased activities of daily living (ADL)  Z78.9 V49.89     Patient Active Problem List   Diagnosis    Intractable pain    Wrist fracture    Rib fractures    Lung nodule    Dyspnea    Closed fracture of one rib, unspecified laterality, initial encounter    Breast asymmetry in female    Breast skin changes    GERD (gastroesophageal reflux disease)    Chronic nausea    Pedal edema    Esophagitis determined by endoscopy    Hypokalemia    Periodic paralysis    Acute UTI (urinary tract infection)     Past Medical History:   Diagnosis Date    AMD (age related macular degeneration)     Anemia Following surgery    Arthritis     Asthma     Breast cancer Left breast.    Breast mass 2 lumpectomies 1 cancerous 1 benign    CHF (congestive heart failure) Just getting evaluation    Colon polyp Removed during colonos    COPD (chronic obstructive pulmonary disease)     Coronary artery disease Chf    Deep vein thrombosis 1979    Fibrocystic breast Left breast    Fibromyalgia     History of transfusion Aug 2019 2 units    Hyperlipidemia     Hypertension     Nausea and vomiting 2023    Obesity     Polymyalgia arteritica     Postoperative wound infection Lymph node resection site,5th toe left foot infection after neuroma surgery. 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    Sciatica      Past Surgical History:   Procedure Laterality Date    BREAST BIOPSY  Left    BREAST SURGERY  Lumpectomies left breast    ENDOSCOPY N/A 2023    Procedure: ESOPHAGOGASTRODUODENOSCOPY WITH BIOPSIES;  Surgeon: Maximino Aceves MD;  Location: [facility] ENDOSCOPY;  Service: Gastroenterology;  Laterality: N/A;   ESOPHAGITIS, GASTRITIS, HIATAL HERNIA    EYE SURGERY  Lens implants both eyes    REPLACEMENT TOTAL KNEE      TONSILLECTOMY        General Information       Row Name 01/10/24 1516          OT Time and Intention    Document Type evaluation  -ES     Mode of Treatment individual therapy;occupational therapy  -ES       Row Name 01/10/24 1516          General Information    Patient Profile Reviewed yes  -ES     Prior Level of Function independent:;ADL's;all household mobility;community mobility  Patient independent with B and IADLs at baseline. Rollator for short distance mobility, w/c for longer distances. Tub/shower combo, shower chair. [place]is in seated. No home O2 use. Reports falls at home.  -ES     Existing Precautions/Restrictions fall  -ES     Barriers to Rehab none identified  -ES       Row Name 01/10/24 1516          Occupational Profile    Reason for Services/Referral (Occupational Profile) Patient is 73 yr old female admitted [facility]/8/20[facility] with reports of generalized weakness and inability to walk. OT evaluation and treatment ordered d/t recent decline in ADLs/transfer ability and discharge planning recommendations. No previous OT services for current condition.  -ES       Row Name 01/10/24 1516          Living Environment    People in Home alone  -ES       Row Name 01/10/24 1516          Home Main Entrance    Number of Stairs, Main Entrance one  -ES       Row Name 01/10/24 1516          Stairs Within Home, Primary    Number of Stairs, Within Home, Primary none  -ES       Row Name 01/10/24 1516          Cognition    Orientation Status (Cognition) oriented x 3  Patient pleasant and cooperative, agreeable to thearpy evaluation.  -ES       Row Name 01/10/24 1516          Safety Issues, Functional Mobility    Impairments Affecting Function (Mobility) balance;strength;endurance/activity tolerance;pain  -ES               User Key  (r) = Recorded By, (t) = Taken By, (c) = Cosigned By       Initials Name Provider Type    ES Shania Rushing, OTR/L, CSRS Occupational Therapist                     Mobility/ADL's       Row Name 01/10/24 1520          Bed Mobility    Bed Mobility supine-sit;sit-supine  -ES     Supine-Sit [place] (Bed Mobility) moderate assist (50% patient effort);1 person assist  -ES     Sit-Supine [place] (Bed Mobility) maximum assist (25% patient effort);2 person assist  -ES     Bed Mobility, Safety Issues decreased use of arms for pushing/pulling;decreased use of legs for bridging/pushing  -ES     Assistive Device (Bed Mobility) draw sheet;bed rails  -ES       Row Name 01/10/24 1520          Transfers    Transfers sit-stand transfer;stand-sit transfer  -ES     Comment, (Transfers) bed height elevated for assist with sit <> stand  -ES       Row Name 01/10/24 1520          Sit-Stand Transfer    Sit-Stand [place] (Transfers) moderate assist (50% patient effort);1 person assist  -ES     Assistive Device (Sit-Stand Transfers) walker, front-wheeled  -ES       Row Name 01/10/24 1520          Stand-Sit Transfer    Stand-Sit [place] (Transfers) moderate assist (50% patient effort);1 person assist  -ES     Assistive Device (Stand-Sit Transfers) walker, front-wheeled  -ES       Row Name 01/10/24 1520          Activities of Daily Living    BADL Assessment/Intervention bathing;upper body dressing;lower body dressing;grooming;feeding;toileting  -ES       Row Name 01/10/24 1520          Bathing Assessment/Intervention    [place] Level (Bathing) bathing skills;maximum assist (25% patient effort)  -ES       Row Name 01/10/24 1520          Upper Body Dressing Assessment/Training    [place] Level (Upper Body Dressing) upper body dressing skills;set up  -ES       Row Name 01/10/24 1520          Lower Body Dressing Assessment/Training    [place] Level (Lower Body Dressing) lower body dressing skills;maximum assist (25% patient effort)  -ES       Row Name 01/10/24 1520           Grooming Assessment/Training    [place] Level (Grooming) grooming skills;minimum assist (75% patient effort)  -ES       Row Name 01/10/24 1520          Self-Feeding Assessment/Training    [place] Level (Feeding) feeding skills;set up  -ES       Row Name 01/10/24 1520          Toileting Assessment/Training    [place] Level (Toileting) toileting skills;maximum assist (25% patient effort)  -ES               User Key  (r) = Recorded By, (t) = Taken By, (c) = Cosigned By      Initials Name Provider Type    ES Shania Rushing, OTR/L, CSRS Occupational Therapist                   Obj/Interventions       Row Name 01/10/24 1522          Sensory Assessment (Somatosensory)    Sensory Assessment (Somatosensory) sensation intact  -ES       Row Name 01/10/24 1522          Vision Assessment/Intervention    Visual Impairment/Limitations WFL  -ES       Row Name 01/10/24 1522          Range of Motion Comprehensive    General Range of Motion bilateral upper extremity ROM WNL  -ES       Row Name 01/10/24 1522          Strength Comprehensive (MMT)    General Manual Muscle Testing (MMT) Assessment upper extremity strength deficits identified;lower extremity strength deficits identified  -ES     Comment, General Manual Muscle Testing (MMT) Assessment BUEs 4-/5  -ES       Row Name 01/10/24 1522          Motor Skills    Motor Skills functional endurance  -ES     Functional Endurance fair minus  -ES       Row Name 01/10/24 1522          Balance    Balance Assessment sitting dynamic balance;standing dynamic balance  -ES     Dynamic Sitting Balance contact guard  -ES     Position, Sitting Balance unsupported;sitting edge of bed  -ES     Dynamic Standing Balance moderate assist;1-person assist;minimal assist  -ES     Position/Device Used, Standing Balance supported;walker, front-wheeled  -ES               User Key  (r) = Recorded By, (t) = Taken By, (c) = Cosigned By      Initials Name Provider Type    ES Shania Rushing, KAYR/L,  CSRS Occupational Therapist                   Goals/Plan       Row Name 01/10/24 1525          Bed Mobility Goal 1 (OT)    Activity/Assistive Device (Bed Mobility Goal 1, OT) bed mobility activities, all  -ES     [place] Level/Cues Needed (Bed Mobility Goal 1, OT) modified independence  -ES     Time Frame (Bed Mobility Goal 1, OT) long term goal (LTG);10 days  -ES       Row Name 01/10/24 1525          Transfer Goal 1 (OT)    Activity/Assistive Device (Transfer Goal 1, OT) transfers, all  -ES     [place] Level/Cues Needed (Transfer Goal 1, OT) modified independence  -ES     Time Frame (Transfer Goal 1, OT) long term goal (LTG);10 days  -ES       Row Name 01/10/24 1525          Bathing Goal 1 (OT)    Activity/Device (Bathing Goal 1, OT) bathing skills, all  -ES     [place] Level/Cues Needed (Bathing Goal 1, OT) modified independence  -ES     Time Frame (Bathing Goal 1, OT) long term goal (LTG);10 days  -ES       Row Name 01/10/24 1525          Dressing Goal 1 (OT)    Activity/Device (Dressing Goal 1, OT) dressing skills, all  -ES     [place]/Cues Needed (Dressing Goal 1, OT) modified independence  -ES     Time Frame (Dressing Goal 1, OT) long term goal (LTG);10 days  -ES       Row Name 01/10/24 1525          Toileting Goal 1 (OT)    Activity/Device (Toileting Goal 1, OT) toileting skills, all  -ES     [place] Level/Cues Needed (Toileting Goal 1, OT) modified independence  -ES     Time Frame (Toileting Goal 1, OT) long term goal (LTG);10 days  -ES       Row Name 01/10/24 1525          Grooming Goal 1 (OT)    Activity/Device (Grooming Goal 1, OT) grooming skills, all  -ES     [place] (Grooming Goal 1, OT) modified independence  -ES     Time Frame (Grooming Goal 1, OT) long term goal (LTG);10 days  -ES       Row Name 01/10/24 1525          Strength Goal 1 (OT)    Strength Goal 1 (OT) Pt will improve BUE strength to 4/5 muscle grade for increased UE strength required for ADL transfers and  task completion  -ES     Time Frame (Strength Goal 1, OT) long term goal (LTG);10 days  -ES       Row Name 01/10/24 1526          Problem Specific Goal 1 (OT)    Problem Specific Goal 1 (OT) Patient will demonstrate fair plus task tolerance in preperation for independent ADL routine completion at time of discharge  -ES     Time Frame (Problem Specific Goal 1, OT) long term goal (LTG);10 days  -ES       Row Name 01/10/24 1522          Therapy Assessment/Plan (OT)    Planned Therapy Interventions (OT) activity tolerance training;BADL retraining;functional balance retraining;occupation/activity based interventions;patient/caregiver education/training;strengthening exercise;transfer/mobility retraining  -ES               User Key  (r) = Recorded By, (t) = Taken By, (c) = Cosigned By      Initials Name Provider Type    Shania Grossman, OTR/L, CSRS Occupational Therapist                   Clinical Impression       Row Name 01/10/24 1522          Plan of Care Review    Plan of Care Reviewed With patient  -ES     Outcome Evaluation Patient has experienced decline in function from baseline status, presenting w/ deficits related to balance, pain, strength, tolerance, transfers and mobility that impede patient independence with activities of daily living.  Patient would benefit from skilled Occupational Therapy intervention to maxamize patient safety, and promote return to baseline independence.  -ES       Row Name 01/10/24 1521          Therapy Assessment/Plan (OT)    Rehab Potential (OT) good, to achieve stated therapy goals  -ES     Criteria for Skilled Therapeutic Interventions Met (OT) yes;meets criteria;skilled treatment is necessary  -ES     Therapy Frequency (OT) 5 times/wk  -ES       Row Name 01/10/24 152          Therapy Plan Review/Discharge Plan (OT)    Anticipated Discharge Disposition (OT) inpatient rehabilitation facility  -ES               User Key  (r) = Recorded By, (t) = Taken By, (c) = Cosigned By       Initials Name Provider Type    Shania Grossman, OTR/L, RADHA Occupational Therapist                   Outcome Measures       Row Name 01/10/24 1526          How much help from another is currently needed...    Putting on and taking off regular lower body clothing? 2  -ES     Bathing (including washing, rinsing, and drying) 2  -ES     Toileting (which includes using toilet bed pan or urinal) 2  -ES     Putting on and taking off regular upper body clothing 3  -ES     Taking care of personal grooming (such as brushing teeth) 3  -ES     Eating meals 4  -ES     AM-PAC 6 Clicks Score (OT) 16  -ES       Row Name 01/10/24 1526          Functional Assessment    Outcome Measure Options AM-PAC 6 Clicks Daily Activity (OT)  -ES               User Key  (r) = Recorded By, (t) = Taken By, (c) = Cosigned By      Initials Name Provider Type    GUS Shania Rushing, OTR/L, KOMALS Occupational Therapist                      OT Recommendation and Plan  Planned Therapy Interventions (OT): activity tolerance training, BADL retraining, functional balance retraining, occupation/activity based interventions, patient/caregiver education/training, strengthening exercise, transfer/mobility retraining  Therapy Frequency (OT): 5 times/wk  Plan of Care Review  Plan of Care Reviewed With: patient  Outcome Evaluation: Patient has experienced decline in function from baseline status, presenting w/ deficits related to balance, pain, strength, tolerance, transfers and mobility that impede patient independence with activities of daily living.  Patient would benefit from skilled Occupational Therapy intervention to maxamize patient safety, and promote return to baseline independence.     Time Calculation:   Evaluation Complexity (OT)  Review Occupational Profile/Medical/Therapy History Complexity: brief/low complexity  Assessment, Occupational Performance/Identification of Deficit Complexity: 3-5 performance deficits  Clinical Decision Making Complexity (OT):  problem focused assessment/low complexity  Overall Complexity of Evaluation (OT): low complexity     Time Calculation- OT       Row Name 01/10/24 1528             Time Calculation- OT    OT Received On 01/10/24  -ES      OT Goal Re-Cert Due Date 01/19/24  -ES         Untimed Charges    OT Eval/Re-eval Minutes 35  -ES         Total Minutes    Untimed Charges Total Minutes 35  -ES       Total Minutes 35  -ES                User Key  (r) = Recorded By, (t) = Taken By, (c) = Cosigned By      Initials Name Provider Type    Shania Grossman, OTR/L, CSRS Occupational Therapist                  Therapy Charges for Today       Code Description Service Date Service Provider Modifiers Qty    38956875334 HC OT EVAL LOW COMPLEXITY 3 1/10/2024 Shania Rushing OTR/L, CSRS GO 1                 ISIDRA Mcgee/L, CSRS  1/10/2024

## 2024-01-10 NOTE — SIGNIFICANT NOTE
01/10/24 1315   Coping/Psychosocial   Observed Emotional State calm;cooperative   Verbalized Emotional State hopefulness   Trust Relationship/Rapport empathic listening provided   Involvement in Care interacting with patient   Additional Documentation Spiritual Care (Group)   Spiritual Care   Use of Spiritual Resources non-Orthodoxy use of spiritual care   Spiritual Care Source  initiative   Spiritual Care Follow-Up follow-up, none required as presently assessed   Response to Spiritual Care receptive of support;thanks expressed   Spiritual Care Interventions supportive conversation provided   Spiritual Care Visit Type initial   Receptivity to Spiritual Care visit welcomed

## 2024-01-10 NOTE — PROGRESS NOTES
[facility]     Progress Note    Patient Name: Lona Rodríguez  : 1950  MRN: [id number]  Primary Care Physician:  Christopher Thao MD  Date of admission: 2024      Subjective   Brief summary.  Patient admitted with severe hypokalemia and paralysis      HPI:  Improving, still have leg cramps and twitching and weakness.  No chest pain no shortness of breath,    Review of Systems     Fatigue and weakness, no urinary symptoms, no burning.      Objective     Vitals:   Temp:  [97.5 °F (36.4 °C)-98.1 °F (36.7 °C)] 97.7 °F (36.5 °C)  Heart Rate:  [86-94] 86  Resp:  [18] 18  BP: (101-116)/(58-65) 116/64    Physical Exam :     Elderly female not in acute distress.  Awake alert and oriented.  Heart regular.  Lungs clear.  Abdomen soft obese.  Extremities trace of edema      Result Review:  I have personally reviewed the results from the time of this admission to 1/10/2024 15:06 EST and agree with these findings:  []  Laboratory  []  Microbiology  []  Radiology  []  EKG/Telemetry   []  Cardiology/Vascular   []  Pathology  []  Old records  []  Other:       Potassium 3.2    Assessment / Plan       Active Hospital Problems:  Active Hospital Problems    Diagnosis     Periodic paralysis     Hypokalemia        Plan:   Increase potassium supplementation patient does not want IV as it is blowing her veins.  Continue antibiotics for UTI.  PT OT.  Evaluate for inpatient rehab       DVT prophylaxis:  Medical DVT prophylaxis orders are present.    CODE STATUS:   Code Status (Patient has no pulse and is not breathing): CPR (Attempt to Resuscitate)  Medical Interventions (Patient has pulse or is breathing): Full Support            Electronically signed by Luiz Diana MD, 01/10/24, 3:06 PM EST.

## 2024-01-10 NOTE — PAYOR COMM NOTE
"Lona Rodríguez (73 y.o. Female)       Date of Birth   1950    Social Security Number       Address   [address]  [place] [address]    Home Phone   [phone]    MRN   [id number]       Voodoo   Latter-day    Marital Status   Single                            Admission Date   1/8/24    Admission Type   Emergency    Admitting Provider   Luiz Diana MD    Attending Provider   Luiz Diana MD    Department, Room/Bed   [facility] CARE UNIT, 216/2       Discharge Date       Discharge Disposition       Discharge Destination                                 Attending Provider: Luiz Diana MD    Allergies: Morphine    Isolation: None   Infection: None   Code Status: CPR    Ht: 168.9 cm (66.5\")   Wt: 91.5 kg (201 lb 11.5 oz)    Admission Cmt: None   Principal Problem: None                  Active Insurance as of 1/8/2024       Primary Coverage       Payor Plan Insurance Group Employer/Plan Group    HUMANA MEDICARE REPLACEMENT HUMANA MEDICARE REPLACEMENT [id number]       Payor Plan Address Payor Plan Phone Number Payor Plan Fax Number Effective Dates    [address] [address] [phone]  1/1/2023 - None Entered    [facility] [id number]         Subscriber Name Subscriber Birth Date Member ID       LONA RODRÍGUEZ 1950 [id number]                     Emergency Contacts        (Rel.) Home Phone Work Phone Mobile Phone    LUIS FELIPEESTEBAN (Neighbor) [phone] -- [phone]    Susan Fowler (Relative) [phone] -- --                     Systemic or Infectious Condition GRG Clinical Indications for Admission to Inpatient Care       Indications Met   Last updated by Amee Preciado RN on 1/9/2024 0945     Review Status Created By   Primary Completed Amee Preciado RN      Criteria Review   Systemic or Infectious Condition GRG Clinical Indications for Admission to Inpatient Care     Overall Determination: Indications Met     Criteria:  [×] Hospital admission is needed for " appropriate care of the patient because of  1 or more  of the following :      [×] Severe electrolyte abnormalities requiring inpatient care          1/9/2024  9:45 AM              -- 1/9/2024  9:45 AM by Amee Preciado RN --                                    (X) Severe electrolyte abnormalities requiring inpatient care, as indicated by  ALL  of the following  (1) (2) (3) (4):                  (X) Electrolyte abnormality is not at acceptable patient baseline (eg, treatment effect).                  (X) Severe abnormality, as indicated by  1 or more  of the following :                  (X) Potassium less than 3 mEq/L (mmol/L) with severe finding requiring inpatient management (eg, weakness, arrhythmia, cardiac conduction disturbance) (7)          1/9/2024  9:45 AM              -- 1/9/2024  9:45 AM by Amee Preciado RN --                  K+ 2.5 on arrival                  see clinical note for s/s                  CA decreased to 7.5 and NA decreased to 130 at time of review     Notes:  -- 1/9/2024  9:45 AM by Amee Preciado RN --      Retro review for 1/8                    -- 1/9/2024  9:45 AM by Amee Preciado RN --      This patient is a pleasant 73-year-old female presenting with diffuse weakness and was unable to get out of bed today and on arrival was found to have low potassium of 2.5.            She also looks somewhat dehydrated on exam and had low sodium and chloride so I gave her some IV saline in the ED and also some IV potassium chloride runs and oral potassium chloride in the ED, as I think she actually had an encounter of periodic hypokalemic paralysis earlier at home.      In addition she had an elevated white count of 14 and UTI and I gave her ceftriaxone in the ED.      Finally, as her liver enzymes were elevated I got a CT of the abdomen but no acute findings noted.      We had significant difficulty obtaining IV access on this patient requiring finally ultrasound-guided access.                                        [facility] ANJEL Rodriguez   [phone]-  F [phone]                                 History & Physical        Luiz Diana MD at 24 0939           [facility]   HISTORY AND PHYSICAL    Patient Name: Lona Rodríguez  : 1950  MRN: [id number]  Primary Care Physician:  Christopher Thao MD  Date of admission: 2024    Subjective  Subjective     Chief Complaint:   Fatigue and weakness inability to walk and move      HPI:    Lona Rodríguez is a 73 y.o. female with known history of hypokalemia came to emergency room with increased weakness and inability to walk and move out of bed.  Patient potassium was 2.5 in ER.  Patient admitted to medical service.  Started on IV potassium patient could not tolerate IV potassium.  Oral potassium and magnesium given.  Patient feeling somewhat better.        Review of Systems:    Fatigue and weakness.  Inability to move in bed.  Inability to walk.  No chest pain.  No shortness of breath  Denies palpitations  No nausea vomiting or diarrhea    Personal History     Past Medical History:   Diagnosis Date    AMD (age related macular degeneration)     Anemia Following surgery    Arthritis     Asthma     Breast cancer Left breast.    Breast mass 2 lumpectomies 1 cancerous 1 benign    CHF (congestive heart failure) Just getting evaluation    Colon polyp Removed during colonos    COPD (chronic obstructive pulmonary disease)     Coronary artery disease Chf    Deep vein thrombosis 1979    Fibrocystic breast Left breast    Fibromyalgia     History of transfusion Aug 2019 2 units    Hyperlipidemia     Hypertension     Nausea and vomiting 2023    Obesity     Polymyalgia arteritica     Postoperative wound infection Lymph node resection site,5th toe left foot infection after neuroma surgery. 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    Sciatica        Past Surgical History:   Procedure Laterality Date    BREAST BIOPSY  Left    BREAST SURGERY  Lumpectomies left  breast    ENDOSCOPY N/A 12/2/2023    Procedure: ESOPHAGOGASTRODUODENOSCOPY WITH BIOPSIES;  Surgeon: Maximino Aceves MD;  Location: [facility] ENDOSCOPY;  Service: Gastroenterology;  Laterality: N/A;  ESOPHAGITIS, GASTRITIS, HIATAL HERNIA    EYE SURGERY  Lens implants both eyes    REPLACEMENT TOTAL KNEE      TONSILLECTOMY         Family History: family history includes Arthritis in her brother, father, and mother; COPD in her father; Cancer in her mother; Diabetes in her brother; Heart disease in her mother; Learning disabilities in her brother; Mental illness in her maternal aunt. Otherwise pertinent FHx was reviewed and not pertinent to current issue.    Social History:  reports that she has never smoked. She has never used smokeless tobacco. She reports that she does not drink alcohol and does not use drugs.    Home Medications:  acetaminophen, amitriptyline, atorvastatin, levothyroxine, montelukast, spironolactone, and vitamin D      Allergies:  Allergies   Allergen Reactions    Morphine Anaphylaxis       Objective  Objective     Vitals:   Temp:  [97.2 °F (36.2 °C)-98.1 °F (36.7 °C)] 98.1 °F (36.7 °C)  Heart Rate:  [] 93  Resp:  [18-20] 18  BP: (100-118)/(49-80) 106/54    Physical Exam    Elderly female not in acute distress  Heart regular  Lungs clear  Abdomen soft obese nontender  Extremities no edema  Neuro Global weakness      I have personally reviewed the results from the time of this admission to 1/9/2024 17:17 EST and agree with these findings:  [x]  Laboratory  []  Microbiology  []  Radiology  []  EKG/Telemetry   []  Cardiology/Vascular   []  Pathology  []  Old records  []  Other:    CBC:    WBC   Date Value Ref Range Status   01/09/2024 9.16 3.40 - 10.80 10*3/mm3 Final     RBC   Date Value Ref Range Status   01/09/2024 3.44 (L) 3.77 - 5.28 10*6/mm3 Final     Hemoglobin   Date Value Ref Range Status   01/09/2024 10.6 (L) 12.0 - 15.9 g/dL Final     Hematocrit   Date Value Ref Range Status   01/09/2024 31.1  (L) 34.0 - 46.6 % Final     MCV   Date Value Ref Range Status   01/09/2024 90.4 79.0 - 97.0 fL Final     MCH   Date Value Ref Range Status   01/09/2024 30.8 26.6 - 33.0 pg Final     MCHC   Date Value Ref Range Status   01/09/2024 34.1 31.5 - 35.7 g/dL Final     RDW   Date Value Ref Range Status   01/09/2024 22.5 (H) 12.3 - 15.4 % Final     RDW-SD   Date Value Ref Range Status   01/09/2024 71.7 (H) 37.0 - 54.0 fl Final     MPV   Date Value Ref Range Status   01/09/2024 10.0 6.0 - 12.0 fL Final     Platelets   Date Value Ref Range Status   01/09/2024 245 140 - 450 10*3/mm3 Final     Neutrophil %   Date Value Ref Range Status   01/09/2024 64.1 42.7 - 76.0 % Final     Lymphocyte %   Date Value Ref Range Status   01/09/2024 18.6 (L) 19.6 - 45.3 % Final     Monocyte %   Date Value Ref Range Status   01/09/2024 14.6 (H) 5.0 - 12.0 % Final     Eosinophil %   Date Value Ref Range Status   01/09/2024 0.9 0.3 - 6.2 % Final     Basophil %   Date Value Ref Range Status   01/09/2024 0.4 0.0 - 1.5 % Final     Immature Grans %   Date Value Ref Range Status   01/09/2024 1.4 (H) 0.0 - 0.5 % Final     Neutrophils, Absolute   Date Value Ref Range Status   01/09/2024 5.87 1.70 - 7.00 10*3/mm3 Final     Lymphocytes, Absolute   Date Value Ref Range Status   01/09/2024 1.70 0.70 - 3.10 10*3/mm3 Final     Monocytes, Absolute   Date Value Ref Range Status   01/09/2024 1.34 (H) 0.10 - 0.90 10*3/mm3 Final     Eosinophils, Absolute   Date Value Ref Range Status   01/09/2024 0.08 0.00 - 0.40 10*3/mm3 Final     Basophils, Absolute   Date Value Ref Range Status   01/09/2024 0.04 0.00 - 0.20 10*3/mm3 Final     Immature Grans, Absolute   Date Value Ref Range Status   01/09/2024 0.13 (H) 0.00 - 0.05 10*3/mm3 Final     nRBC   Date Value Ref Range Status   01/09/2024 0.0 0.0 - 0.2 /100 WBC Final        BMP:    Lab Results   Component Value Date    GLUCOSE 70 01/09/2024    BUN 12 01/09/2024    CREATININE 0.62 01/09/2024    BCR 19.4 01/09/2024    K 3.1  (L) 01/09/2024    CO2 22.5 01/09/2024    CALCIUM 7.5 (L) 01/09/2024    ALBUMIN 1.9 (L) 01/09/2024    LABIL2 1.4 09/24/2019    AST 73 (H) 01/09/2024    ALT 48 (H) 01/09/2024        CT Abdomen Pelvis Without Contrast    Result Date: 1/8/2024    1. Fluid-filled loops and scattered air-fluid levels of the small bowel suggest a nonspecific enteritis.  No evidence of obstruction 2. Diffuse hepatic steatosis please correlate with liver function test 3. Cystic lesion right ovary.  Findings are similar given differences in technique to the comparison.  Nonemergent pelvic ultrasound recommended to evaluate if not already performed.  4. Hiatal hernia 5. Other findings as above     LUC JENSEN MD       Electronically Signed and Approved By: LUC JENSEN MD on 1/08/2024 at 21:09             XR Chest 1 View    Result Date: 1/8/2024    1. Cardiomegaly. 2. No radiographic findings of acute pulmonary abnormality.       BLANE JOHNSON MD       Electronically Signed and Approved By: BLANE JOHNSON MD on 1/08/2024 at 15:23                     Assessment & Plan  Assessment / Plan       Current Diagnosis:  Active Hospital Problems    Diagnosis     Periodic paralysis     Hypokalemia      Plan:   Admit to hospital  IV and oral potassium  Magnesium replacement  Monitor electrolytes and rhythm  Increase activity with PT and OT  Repeat labs in a.m.      DVT prophylaxis:  Medical DVT prophylaxis orders are present.    GI Prophylaxis:       Pepcid    CODE STATUS:    Code Status (Patient has no pulse and is not breathing): CPR (Attempt to Resuscitate)  Medical Interventions (Patient has pulse or is breathing): Full Support    Admission Status:  I believe this patient meets inpatient status.             I have dictated this note utilizing Dragon Dictation.             Please note that portions of this note were completed with a voice recognition program.             Part of this note may be an electronic transcription/translation of spoken  language to printed text         using the Dragon Dictation System.       Electronically signed by Luiz Diana MD, 01/09/24, 9:39 AM EST.    Total time spent with in evaluation and management:           Electronically signed by Luiz Diana MD at 01/09/24 1717          Emergency Department Notes        Leander Irvin MD at 01/08/24 6379          Time: 11:08 PM EST  Date of encounter:  1/8/2024  Independent Historian/Clinical History and Information was obtained by:   Patient    History is limited by: N/A    Chief Complaint: Weakness and unable to move earlier today.      History of Present Illness:  Patient is a 73 y.o. year old female with previous history of hypokalemia who presents to the emergency department for evaluation of episode of generalized weakness where she was unable to move or even get out of bed earlier.    Finally she was able to move some but still feels generally weak all over, but denies any one-sided weakness or numbness.    She denies any vomiting or diarrhea recently and no diuretic medications reported.    She does have history of hypokalemia and states this feels similar today.        HPI    Patient Care Team  Primary Care Provider: Christopher Thao MD    Past Medical History:     Allergies   Allergen Reactions    Morphine Anaphylaxis     Past Medical History:   Diagnosis Date    AMD (age related macular degeneration)     Anemia Following surgery    Arthritis     Asthma     Breast cancer Left breast.    Breast mass 2 lumpectomies 1 cancerous 1 benign    CHF (congestive heart failure) Just getting evaluation    Colon polyp Removed during colonos    COPD (chronic obstructive pulmonary disease)     Coronary artery disease Chf    Deep vein thrombosis 1979    Fibrocystic breast Left breast    Fibromyalgia     History of transfusion Aug 2019 2 units    Hyperlipidemia     Hypertension     Nausea and vomiting 11/29/2023    Obesity     Polymyalgia arteritica     Postoperative wound infection Lymph  node resection site,5th toe left foot infection after neuroma surgery. 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    Sciatica      Past Surgical History:   Procedure Laterality Date    BREAST BIOPSY  Left    BREAST SURGERY  Lumpectomies left breast    ENDOSCOPY N/A 12/2/2023    Procedure: ESOPHAGOGASTRODUODENOSCOPY WITH BIOPSIES;  Surgeon: Maximino Aceves MD;  Location: [facility] ENDOSCOPY;  Service: Gastroenterology;  Laterality: N/A;  ESOPHAGITIS, GASTRITIS, HIATAL HERNIA    EYE SURGERY  Lens implants both eyes    REPLACEMENT TOTAL KNEE      TONSILLECTOMY       Family History   Problem Relation Age of Onset    Arthritis Mother     Cancer Mother     Heart disease Mother     Arthritis Father     COPD Father     Arthritis Brother     Diabetes Brother     Learning disabilities Brother         Dyslexic    Mental illness Maternal Aunt         Schizophrenic       Home Medications:  Prior to Admission medications    Medication Sig Start Date End Date Taking? Authorizing Provider   acetaminophen (Tylenol) 325 MG tablet Take 2 tablets by mouth Every 6 (Six) Hours As Needed for Mild Pain.    Ciara Mcgarry MD   amitriptyline (ELAVIL) 50 MG tablet Take 1 tablet by mouth Every Night.    Ciara Mcgarry MD   atorvastatin (LIPITOR) 20 MG tablet Take 1 tablet by mouth Daily.    Ciara Mcgarry MD   HYDROcodone-acetaminophen (NORCO) 7.5-325 MG per tablet Take 1 tablet by mouth Every 6 (Six) Hours. 4/20/23   Ciara Mcgarry MD   levothyroxine (SYNTHROID, LEVOTHROID) 75 MCG tablet Take 1 tablet by mouth Daily.    Ciara Mcgarry MD   methocarbamol (ROBAXIN) 500 MG tablet Take 1 tablet by mouth Every 12 (Twelve) Hours. 4/20/23   Ciara Mcgarry MD   metoclopramide (REGLAN) 5 MG tablet Take 1 tablet by mouth 4 (Four) Times a Day. 11/21/23   Ciara Mcgarry MD   montelukast (SINGULAIR) 10 MG tablet Take 1 tablet by mouth Every Night.    Ciara Mcgarry MD   ondansetron (ZOFRAN) 4 MG tablet TAKE ONE TABLET BY  "MOUTH EVERY 8 HOURS for 7 days 12/7/23   ProviderCiara MD   spironolactone (ALDACTONE) 25 MG tablet Take 1 tablet by mouth Daily for 30 days. 12/6/23 1/5/24  Luiz Diana MD   vitamin D (ERGOCALCIFEROL) 1.25 MG (74302 UT) capsule capsule Take 1 capsule by mouth 2 (Two) Times a Week. Monday and Wednesday    ProviderCiara MD        Social History:   Social History     Tobacco Use    Smoking status: Never    Smokeless tobacco: Never    Tobacco comments:     Grew up with [id number]   Vaping Use    Vaping Use: Never used   Substance Use Topics    Alcohol use: Never    Drug use: Never         Review of Systems:  Review of Systems   I performed a 10 point review of systems which was all negative, except for the positives found in the HPI above.  Physical Exam:  /62   Pulse 96   Temp 98 °F (36.7 °C) (Oral)   Resp 22   Ht 168.9 cm (66.5\")   Wt 93.8 kg (206 lb 12.7 oz)   SpO2 100%   BMI 32.88 kg/m²     Physical Exam   General: Awake alert and in no obvious distress, appears mildly weak but no signs of stroke noted on exam.    HEENT: Head normocephalic atraumatic, eyes PERRLA EOMI, nose normal, oropharynx normal.  He does membranes look somewhat dry.    Neck: Supple full range of motion, no meningismus, no lymphadenopathy    Heart: Regular rate and rhythm, no murmurs or rubs, 2+ radial pulses bilaterally    Lungs: Clear to auscultation bilaterally without wheezes or crackles, no respiratory distress    Abdomen: Soft, nontender, nondistended, no rebound or guarding    Skin: Warm, dry, no rash    Musculoskeletal: Normal range of motion, no lower extremity edema    Neurologic: Oriented x3, no motor deficits no sensory deficits    Psychiatric: Mood appears stable, no psychosis          Procedures:  Procedures      Medical Decision Making:      Comorbidities that affect care:    History of hypokalemia    External Notes reviewed:    Previous Labs: I compared today's lab work to previous labs and it looks " like she has a new acute rise in her LFTs and also some elevated white blood cell count.      The following orders were placed and all results were independently analyzed by me:  Orders Placed This Encounter   Procedures    Blood Culture - Blood,    Blood Culture - Blood,    Urine Culture - Urine,    XR Chest 1 View    CT Abdomen Pelvis Without Contrast    [place] Draw    Comprehensive Metabolic Panel    Single High Sensitivity Troponin T    Magnesium    Urinalysis With Microscopic If Indicated (No Culture) - Urine, Clean Catch    CBC Auto Differential    Scan Slide    Urinalysis, Microscopic Only - Urine, Clean Catch    Lactic Acid, Plasma    Procalcitonin    NPO Diet NPO Type: Strict NPO    Undress & Gown    Continuous Pulse Oximetry    Vital Signs    Orthostatic Blood Pressure    Code Status and Medical Interventions:    Internal Medicine (on-call MD unless specified)    Oxygen Therapy- Nasal Cannula; Titrate 1-6 LPM Per SpO2; 90 - 95%    POC Glucose Once    ECG 12 Lead ED Triage Standing Order; Weak / Dizzy / AMS    Insert Peripheral IV    Initiate Observation Status    Fall Precautions    CBC & Differential    Green Top (Gel)    Lavender Top    Gold Top - SST    Light Blue Top       Medications Given in the Emergency Department:  Medications   sodium chloride 0.9 % flush 10 mL (has no administration in time range)   potassium chloride 10 mEq in 100 mL IVPB (0 mEq Intravenous Hold 1/8/24 1834)   sodium chloride 0.9 % bolus 1,000 mL (0 mL Intravenous Hold 1/8/24 1835)   cefTRIAXone (ROCEPHIN) IVPB 1,000 mg (has no administration in time range)   potassium chloride (KLOR-CON) packet 40 mEq (40 mEq Oral Given 1/8/24 1835)   HYDROcodone-acetaminophen (NORCO) 5-325 MG per tablet 1 tablet (1 tablet Oral Given 1/8/24 2157)   ketorolac (TORADOL) injection 15 mg (15 mg Intravenous Given 1/8/24 2158)        ED Course:    ED Course as of 01/08/24 2312   Mon Jan 08, 2024   3808 EKG: I interpreted her twelve-lead EKG is  normal sinus rhythm at 90 bpm, normal P waves, QRS showing inferior and anteroseptal Q waves as well as incomplete left bundle branch block and LAFB, normal T waves. [VS]      ED Course User Index  [VS] Leander Irvin MD       Labs:    Lab Results (last 24 hours)       Procedure Component Value Units Date/Time    CBC & Differential [[id number]]  (Abnormal) Collected: 01/08/24 1512    Specimen: Blood Updated: 01/08/24 1539    Narrative:      The following orders were created for panel order CBC & Differential.  Procedure                               Abnormality         Status                     ---------                               -----------         ------                     CBC Auto Differential[[id number]]        Abnormal            Final result               Scan Slide[[id number]]                                       Final result                 Please view results for these tests on the individual orders.    Comprehensive Metabolic Panel [[id number]]  (Abnormal) Collected: 01/08/24 1512    Specimen: Blood Updated: 01/08/24 1600     Glucose 98 mg/dL      BUN 13 mg/dL      Creatinine 0.69 mg/dL      Sodium 133 mmol/L      Potassium 2.5 mmol/L      Comment: Slight hemolysis detected by analyzer. Result may be falsely elevated.        Chloride 89 mmol/L      CO2 28.0 mmol/L      Calcium 8.4 mg/dL      Total Protein 6.2 g/dL      Albumin 2.9 g/dL      ALT (SGPT) 79 U/L      AST (SGOT) 118 U/L      Alkaline Phosphatase 192 U/L      Total Bilirubin 2.8 mg/dL      Globulin 3.3 gm/dL      A/G Ratio 0.9 g/dL      BUN/Creatinine Ratio 18.8     Anion Gap 16.0 mmol/L      eGFR 91.8 mL/min/1.73     Narrative:      GFR Normal >60  Chronic Kidney Disease <60  Kidney Failure <15    The GFR formula is only valid for adults with stable renal function between ages 18 and 70.    Single High Sensitivity Troponin T [[id number]]  (Abnormal) Collected: 01/08/24 1512    Specimen: Blood Updated: 01/08/24 1543     HS Troponin T 26 ng/L      Narrative:      High Sensitive Troponin T Reference Range:  <14.0 ng/L- Negative Female for AMI  <22.0 ng/L- Negative Male for AMI  >=14 - Abnormal Female indicating possible myocardial injury.  >=22 - Abnormal Male indicating possible myocardial injury.   Clinicians would have to utilize clinical acumen, EKG, Troponin, and serial changes to determine if it is an Acute Myocardial Infarction or myocardial injury due to an underlying chronic condition.         Magnesium [[id number]]  (Normal) Collected: 01/08/24 1512    Specimen: Blood Updated: 01/08/24 1600     Magnesium 1.6 mg/dL     CBC Auto Differential [[id number]]  (Abnormal) Collected: 01/08/24 1512    Specimen: Blood Updated: 01/08/24 1520     WBC 14.55 10*3/mm3      RBC 4.15 10*6/mm3      Hemoglobin 12.7 g/dL      Hematocrit 35.9 %      MCV 86.5 fL      MCH 30.6 pg      MCHC 35.4 g/dL      RDW 21.4 %      RDW-SD 65.3 fl      MPV 9.5 fL      Platelets 353 10*3/mm3      Neutrophil % 78.7 %      Lymphocyte % 10.3 %      Monocyte % 8.5 %      Eosinophil % 1.0 %      Basophil % 0.3 %      Immature Grans % 1.2 %      Neutrophils, Absolute 11.45 10*3/mm3      Lymphocytes, Absolute 1.50 10*3/mm3      Monocytes, Absolute 1.24 10*3/mm3      Eosinophils, Absolute 0.15 10*3/mm3      Basophils, Absolute 0.04 10*3/mm3      Immature Grans, Absolute 0.17 10*3/mm3      nRBC 0.0 /100 WBC     Scan Slide [[id number]] Collected: 01/08/24 1512    Specimen: Blood Updated: 01/08/24 1539     RBC Morphology Normal     Vacuolated Neutrophils Slight/1+     Platelet Morphology Normal    Urinalysis With Microscopic If Indicated (No Culture) - Urine, Clean Catch [[id number]]  (Abnormal) Collected: 01/08/24 1917    Specimen: Urine, Clean Catch Updated: 01/08/24 1931     Color, UA Dark Yellow     Appearance, UA Clear     pH, UA 6.0     Specific Gravity, UA 1.015     Glucose, UA Negative     Ketones, UA Negative     Bilirubin, UA Negative     Blood, UA Negative     Protein, UA Negative      "Leuk Esterase, UA Moderate (2+)     Nitrite, UA Negative     Urobilinogen, UA 2.0 E.U./dL    Urinalysis, Microscopic Only - Urine, Clean Catch [[id number]]  (Abnormal) Collected: 01/08/24 1917    Specimen: Urine, Clean Catch Updated: 01/08/24 1931     RBC, UA 3-5 /HPF      WBC, UA 11-20 /HPF      Bacteria, UA None Seen /HPF      Squamous Epithelial Cells, UA 0-2 /HPF      Hyaline Casts, UA 3-6 /LPF      Methodology Automated Microscopy    Urine Culture - Urine, Urine, Clean Catch [[id number]] Collected: 01/08/24 1917    Specimen: Urine, Clean Catch Updated: 01/08/24 1942    Lactic Acid, Plasma [[id number]]  (Normal) Collected: 01/08/24 2145    Specimen: Blood Updated: 01/08/24 2208     Lactate 1.2 mmol/L     Procalcitonin [[id number]]  (Abnormal) Collected: 01/08/24 2145    Specimen: Blood Updated: 01/08/24 2214     Procalcitonin 4.82 ng/mL     Narrative:      As a Marker for Sepsis (Non-Neonates):    1. <0.5 ng/mL represents a low risk of severe sepsis and/or septic shock.  2. >2 ng/mL represents a high risk of severe sepsis and/or septic shock.    As a Marker for Lower Respiratory Tract Infections that require antibiotic therapy:    PCT on Admission    Antibiotic Therapy       6-12 Hrs later    >0.5                Strongly Recommended  >0.25 - <0.5        Recommended  0.1 - 0.25          Discouraged              Remeasure/reassess PCT  <0.1                Strongly Discouraged     Remeasure/reassess PCT    As 28 day mortality risk marker: \"Change in Procalcitonin Result\" (>80% or <=80%) if Day 0 (or Day 1) and Day 4 values are available. Refer to http://www.[facility]-pct-calculator.com    Change in PCT <=80%  A decrease of PCT levels below or equal to 80% defines a positive change in PCT test result representing a higher risk for 28-day all-cause mortality of patients diagnosed with severe sepsis for septic shock.    Change in PCT >80%  A decrease of PCT levels of more than 80% defines a negative change in PCT result " representing a lower risk for 28-day all-cause mortality of patients diagnosed with severe sepsis or septic shock.    This test is Prognostic not Diagnostic, if elevated correlate with clinical findings before administering antibiotic treatment.        Blood Culture - Blood, Chest, Left [[id number]] Collected: 01/08/24 2229    Specimen: Blood from Chest, Left Updated: 01/08/24 2246    Blood Culture - Blood, Chest, Left [[id number]] Collected: 01/08/24 2229    Specimen: Blood from Chest, Left Updated: 01/08/24 2246             Imaging:    CT Abdomen Pelvis Without Contrast    Result Date: 1/8/2024  PROCEDURE: CT ABDOMEN PELVIS WO CONTRAST  COMPARISON: [facility], CT, CT ABDOMEN PELVIS W CONTRAST, 12/02/2023, 17:47.  INDICATIONS: Abd pain diarrhea, waekness  TECHNIQUE: CT images were created without intravenous contrast.   PROTOCOL:   Standard imaging protocol performed    RADIATION:   DLP: 732.4mGy*cm   Automated exposure control was utilized to minimize radiation dose.  FINDINGS:    Lung bases:  Chronic pleural thickening and calcification at the left base.  Subpleural opacities noted.  Minimal scattered tree-in-bud opacities noted at the right lung base likely post inflammatory.  There is no free air noted below the diaphragm.  Organs:  Pancreas demonstrates diffuse fatty atrophy.  Spleen, kidneys and adrenal glands are unremarkable in their appearance.  The liver is diffusely decreased in attenuation compatible with hepatic steatosis.  GI tract:  Evaluation of the GI tract is somewhat motion compromised.  Colon demonstrates diverticulosis without definite evidence of acute diverticulitis.  Small bowel demonstrates some scattered air and fluid-filled loops without evidence of obstruction or focal wall thickening.  Stomach demonstrates a moderate hiatal hernia.  No suspicious fluid collections noted within the mesentery.  No suspicious adenopathy  Pelvis:  Limited noncontrast imaging the urinary bladder  is unremarkable.  The uterus is grossly unremarkable.  Multi septated cystic appearance of the right ovary again noted measuring approximately 5.4 by 5.0 cm.  Left ovary is not well visualized.  No suspicious pelvic adenopathy or fluid collection noted  Retroperitoneum:  Atherosclerotic changes are noted of the aorta which is normal in caliber.  There is no suspicious retroperitoneal adenopathy  Bones and soft tissues:  Remote, healed inferior pubic ramus fracture on the left.  Multilevel degenerative changes are noted of the spine.        1. Fluid-filled loops and scattered air-fluid levels of the small bowel suggest a nonspecific enteritis.  No evidence of obstruction 2. Diffuse hepatic steatosis please correlate with liver function test 3. Cystic lesion right ovary.  Findings are similar given differences in technique to the comparison.  Nonemergent pelvic ultrasound recommended to evaluate if not already performed.  4. Hiatal hernia 5. Other findings as above     LUC JENSEN MD       Electronically Signed and Approved By: LUC JENSEN MD on 1/08/2024 at 21:09             XR Chest 1 View    Result Date: 1/8/2024  PROCEDURE: XR CHEST 1 VW  COMPARISON: [facility], CR, XR CHEST 1 VW, 11/28/2023, 19:59.  [facility], CT, CT CHEST W CONTRAST DIAGNOSTIC, 11/28/2023, 21:35.  INDICATIONS: Weak/Dizzy/AMS triage protocol  FINDINGS:  The heart appears enlarged, as before.  Port catheter terminates in the area of the lower SVC.  Mediastinal contour appears grossly normal.  No definite pleural effusion, pneumothorax, or significant pulmonary infiltrate.  There are degenerative changes in the thoracic spine with mild scoliosis.  There is advanced arthropathy at the left glenohumeral joint.        1. Cardiomegaly. 2. No radiographic findings of acute pulmonary abnormality.       BLANE JOHNSON MD       Electronically Signed and Approved By: BLANE JOHNSON MD on 1/08/2024 at 15:23                 Differential Diagnosis and Discussion:    Weakness: Based on the patient's history, signs, and symptoms, the diffential diagnosis includes but is not limited to meningitis, stroke, sepsis, subarachnoid hemorrhage, intracranial bleeding, encephalitis, acute uti, dehydration, MS, myasthenia gravis, Guillan [place], migraine variant, neuromuscular disorders vertigo, electrolyte imbalance, and metabolic disorders.    All labs were reviewed and interpreted by me.  All X-rays impressions were independently interpreted by me.  EKG was interpreted by me.  CT scan radiology impression was interpreted by me.    MDM     Amount and/or Complexity of Data Reviewed  Clinical lab tests: reviewed  Tests in the radiology section of CPT®: reviewed  Tests in the medicine section of CPT®: reviewed  Decide to obtain previous medical records or to obtain history from someone other than the patient: yes         This patient is a pleasant 73-year-old female presenting with diffuse weakness and was unable to get out of bed today and on arrival was found to have low potassium of 2.5.    She also looks somewhat dehydrated on exam and had low sodium and chloride so I gave her some IV saline in the ED and also some IV potassium chloride runs and oral potassium chloride in the ED, as I think she actually had an episode of periodic hypokalemic paralysis earlier at home.    In addition she had an elevated white count of 14 and UTI and I gave her ceftriaxone in the ED.    Finally, as her liver enzymes were elevated I got a CT of the abdomen but no acute findings noted.    We had significant difficulty obtaining IV access on this patient requiring finally ultrasound-guided access.      Critical Care Note: Total Critical Care time of 35 minutes. Total critical care time documented does not include time spent on separately billed procedures for services of nurses or physician assistants. I personally saw and examined the patient. I have reviewed  all diagnostic interpretations and treatment plans as written. I was present for the key portions of any procedures performed and the inclusive time noted in any critical care statement. Critical care time includes patient management by me, time spent at the patients bedside,  time to review lab and imaging results, discussing patient care, documentation in the medical record, and time spent with family or caregiver.        Patient Care Considerations:          Consultants/Shared Management Plan:    PCP: I have discussed the case with Dr. Diana, who agrees to accept the patient for admission.    Social Determinants of Health:    Patient is independent, reliable, and has access to care.       Disposition and Care Coordination:    Admit:   Through independent evaluation of the patient's history, physical, and imperical data, the patient meets criteria for observation/admission to the hospital.        Final diagnoses:   Acute hypokalemia   Hypokalemic periodic paralysis   Elevated LFTs   Acute UTI (urinary tract infection)        ED Disposition       ED Disposition   Decision to Admit    Condition   --    Comment   Level of Care: Telemetry [5]   Diagnosis: Hypokalemia [803626]   Admitting Physician: LUDY DIANA [[id number]]   Attending Physician: LUDY DIANA [[id number]]                 This medical record created using voice recognition software.             Leander Irvin MD  01/08/24 2312      Electronically signed by Leander Irvin MD at 01/08/24 2312       Luciana Archuleta RN at 01/08/24 2205          Patient states that IV is stinging after receiving Toradol IV. Informed patient that it is a common side effect when receiving IV. Pt requested to hold IV fluids at this time.    Electronically signed by Luciana Archuleta RN at 01/08/24 2232       Luciana Archuleta RN at 01/08/24 2100          Received a call from CT saying that Patient's IV infiltrated a second time in CT when flushing line before contrast was given. Provider  notified. IV removed upon return to room. Warm compress applied.     Electronically signed by Luciana Archuleta RN at 01/08/24 3694       Luciana Archuleta RN at 01/08/24 1853          Patient stated that it was painful to flush IV while at CT. CT contrast not given. Patient returned to room. IV removed.    Electronically signed by Luciana Archuleta RN at 01/08/24 2221       Physician Progress Notes (last 24 hours)  Notes from 01/09/24 1120 through 01/10/24 1120   No notes of this type exist for this encounter.       Consult Notes (last 24 hours)  Notes from 01/09/24 1120 through 01/10/24 1120   No notes of this type exist for this encounter.

## 2024-01-10 NOTE — PLAN OF CARE
Goal Outcome Evaluation:      Pt very weak. C/o generalized muscle aches, leg and back pain. Medicated x2. Wound care completed.  She now would like to pursue rehab placement. Shania Jones RN

## 2024-01-10 NOTE — PLAN OF CARE
Goal Outcome Evaluation:  Plan of Care Reviewed With: patient           Outcome Evaluation: Patient has experienced decline in function from baseline status, presenting w/ deficits related to balance, pain, strength, tolerance, transfers and mobility that impede patient independence with activities of daily living.  Patient would benefit from skilled Occupational Therapy intervention to maxamize patient safety, and promote return to baseline independence.      Anticipated Discharge Disposition (OT): inpatient rehabilitation facility

## 2024-01-11 LAB
ALBUMIN SERPL-MCNC: 2.4 G/DL (ref 3.5–5.2)
ALBUMIN/GLOB SERPL: 1 G/DL
ALP SERPL-CCNC: 141 U/L (ref 39–117)
ALT SERPL W P-5'-P-CCNC: 39 U/L (ref 1–33)
ANION GAP SERPL CALCULATED.3IONS-SCNC: 6.6 MMOL/L (ref 5–15)
ANISOCYTOSIS BLD QL: NORMAL
AST SERPL-CCNC: 45 U/L (ref 1–32)
BASOPHILS # BLD AUTO: 0.05 10*3/MM3 (ref 0–0.2)
BASOPHILS NFR BLD AUTO: 0.7 % (ref 0–1.5)
BILIRUB SERPL-MCNC: 0.7 MG/DL (ref 0–1.2)
BUN SERPL-MCNC: 9 MG/DL (ref 8–23)
BUN/CREAT SERPL: 16.1 (ref 7–25)
CALCIUM SPEC-SCNC: 8.3 MG/DL (ref 8.6–10.5)
CHLORIDE SERPL-SCNC: 96 MMOL/L (ref 98–107)
CLUMPED PLATELETS: PRESENT
CO2 SERPL-SCNC: 29.4 MMOL/L (ref 22–29)
CREAT SERPL-MCNC: 0.56 MG/DL (ref 0.57–1)
DEPRECATED RDW RBC AUTO: 74.3 FL (ref 37–54)
EGFRCR SERPLBLD CKD-EPI 2021: 96.5 ML/MIN/1.73
EOSINOPHIL # BLD AUTO: 0.23 10*3/MM3 (ref 0–0.4)
EOSINOPHIL NFR BLD AUTO: 3.4 % (ref 0.3–6.2)
ERYTHROCYTE [DISTWIDTH] IN BLOOD BY AUTOMATED COUNT: 22.9 % (ref 12.3–15.4)
GLOBULIN UR ELPH-MCNC: 2.5 GM/DL
GLUCOSE SERPL-MCNC: 98 MG/DL (ref 65–99)
HCT VFR BLD AUTO: 30.9 % (ref 34–46.6)
HGB BLD-MCNC: 10.5 G/DL (ref 12–15.9)
IMM GRANULOCYTES # BLD AUTO: 0.13 10*3/MM3 (ref 0–0.05)
IMM GRANULOCYTES NFR BLD AUTO: 1.9 % (ref 0–0.5)
LYMPHOCYTES # BLD AUTO: 1.96 10*3/MM3 (ref 0.7–3.1)
LYMPHOCYTES NFR BLD AUTO: 29.4 % (ref 19.6–45.3)
MACROCYTES BLD QL SMEAR: NORMAL
MAGNESIUM SERPL-MCNC: 1.9 MG/DL (ref 1.6–2.4)
MCH RBC QN AUTO: 31 PG (ref 26.6–33)
MCHC RBC AUTO-ENTMCNC: 34 G/DL (ref 31.5–35.7)
MCV RBC AUTO: 91.2 FL (ref 79–97)
MONOCYTES # BLD AUTO: 0.74 10*3/MM3 (ref 0.1–0.9)
MONOCYTES NFR BLD AUTO: 11.1 % (ref 5–12)
NEUTROPHILS NFR BLD AUTO: 3.56 10*3/MM3 (ref 1.7–7)
NEUTROPHILS NFR BLD AUTO: 53.5 % (ref 42.7–76)
NRBC BLD AUTO-RTO: 0 /100 WBC (ref 0–0.2)
PLATELET # BLD AUTO: 302 10*3/MM3 (ref 140–450)
PMV BLD AUTO: 9 FL (ref 6–12)
POTASSIUM SERPL-SCNC: 4.2 MMOL/L (ref 3.5–5.2)
PROT SERPL-MCNC: 4.9 G/DL (ref 6–8.5)
RBC # BLD AUTO: 3.39 10*6/MM3 (ref 3.77–5.28)
SMALL PLATELETS BLD QL SMEAR: ADEQUATE
SODIUM SERPL-SCNC: 132 MMOL/L (ref 136–145)
TARGETS BLD QL SMEAR: NORMAL
WBC MORPH BLD: NORMAL
WBC NRBC COR # BLD AUTO: 6.67 10*3/MM3 (ref 3.4–10.8)

## 2024-01-11 PROCEDURE — 25010000002 ENOXAPARIN PER 10 MG: Performed by: INTERNAL MEDICINE

## 2024-01-11 PROCEDURE — 25010000002 CEFTRIAXONE PER 250 MG: Performed by: INTERNAL MEDICINE

## 2024-01-11 PROCEDURE — 85007 BL SMEAR W/DIFF WBC COUNT: CPT | Performed by: INTERNAL MEDICINE

## 2024-01-11 PROCEDURE — 83735 ASSAY OF MAGNESIUM: CPT | Performed by: INTERNAL MEDICINE

## 2024-01-11 PROCEDURE — 97530 THERAPEUTIC ACTIVITIES: CPT

## 2024-01-11 PROCEDURE — 85025 COMPLETE CBC W/AUTO DIFF WBC: CPT | Performed by: INTERNAL MEDICINE

## 2024-01-11 PROCEDURE — 80053 COMPREHEN METABOLIC PANEL: CPT | Performed by: INTERNAL MEDICINE

## 2024-01-11 PROCEDURE — 97161 PT EVAL LOW COMPLEX 20 MIN: CPT

## 2024-01-11 RX ORDER — KETOROLAC TROMETHAMINE 30 MG/ML
15 INJECTION, SOLUTION INTRAMUSCULAR; INTRAVENOUS ONCE
Status: DISCONTINUED | OUTPATIENT
Start: 2024-01-11 | End: 2024-01-11

## 2024-01-11 RX ORDER — KETOROLAC TROMETHAMINE 10 MG/1
10 TABLET, FILM COATED ORAL EVERY 6 HOURS PRN
Status: DISCONTINUED | OUTPATIENT
Start: 2024-01-11 | End: 2024-01-15

## 2024-01-11 RX ORDER — SPIRONOLACTONE 25 MG/1
25 TABLET ORAL DAILY
Status: DISCONTINUED | OUTPATIENT
Start: 2024-01-11 | End: 2024-01-19 | Stop reason: HOSPADM

## 2024-01-11 RX ORDER — KETOROLAC TROMETHAMINE 10 MG/1
10 TABLET, FILM COATED ORAL EVERY 6 HOURS PRN
Status: DISCONTINUED | OUTPATIENT
Start: 2024-01-12 | End: 2024-01-11

## 2024-01-11 RX ADMIN — FAMOTIDINE 20 MG: 20 TABLET ORAL at 08:30

## 2024-01-11 RX ADMIN — DOCUSATE SODIUM 50MG AND SENNOSIDES 8.6MG 2 TABLET: 8.6; 5 TABLET, FILM COATED ORAL at 20:54

## 2024-01-11 RX ADMIN — ATORVASTATIN CALCIUM 20 MG: 20 TABLET, FILM COATED ORAL at 20:54

## 2024-01-11 RX ADMIN — CEFTRIAXONE SODIUM 2000 MG: 2 INJECTION, POWDER, FOR SOLUTION INTRAMUSCULAR; INTRAVENOUS at 10:09

## 2024-01-11 RX ADMIN — HYDROCODONE BITARTRATE AND ACETAMINOPHEN 1 TABLET: 5; 325 TABLET ORAL at 15:12

## 2024-01-11 RX ADMIN — HYDROCODONE BITARTRATE AND ACETAMINOPHEN 1 TABLET: 5; 325 TABLET ORAL at 11:12

## 2024-01-11 RX ADMIN — LEVOTHYROXINE SODIUM 75 MCG: 75 TABLET ORAL at 06:06

## 2024-01-11 RX ADMIN — SPIRONOLACTONE 25 MG: 25 TABLET ORAL at 15:12

## 2024-01-11 RX ADMIN — HYDROCODONE BITARTRATE AND ACETAMINOPHEN 1 TABLET: 5; 325 TABLET ORAL at 02:16

## 2024-01-11 RX ADMIN — KETOROLAC TROMETHAMINE 10 MG: 10 TABLET, FILM COATED ORAL at 20:54

## 2024-01-11 RX ADMIN — HYDROCODONE BITARTRATE AND ACETAMINOPHEN 1 TABLET: 5; 325 TABLET ORAL at 06:07

## 2024-01-11 RX ADMIN — METHOCARBAMOL 500 MG: 500 TABLET ORAL at 08:30

## 2024-01-11 RX ADMIN — MONTELUKAST 10 MG: 10 TABLET, FILM COATED ORAL at 20:54

## 2024-01-11 RX ADMIN — AMITRIPTYLINE HYDROCHLORIDE 50 MG: 50 TABLET, FILM COATED ORAL at 20:54

## 2024-01-11 RX ADMIN — POTASSIUM CHLORIDE 40 MEQ: 10 CAPSULE, COATED, EXTENDED RELEASE ORAL at 08:30

## 2024-01-11 RX ADMIN — Medication 10 ML: at 20:54

## 2024-01-11 RX ADMIN — ENOXAPARIN SODIUM 40 MG: 100 INJECTION SUBCUTANEOUS at 08:30

## 2024-01-11 RX ADMIN — METHOCARBAMOL 500 MG: 500 TABLET ORAL at 20:54

## 2024-01-11 RX ADMIN — Medication 10 ML: at 08:30

## 2024-01-11 NOTE — PLAN OF CARE
Goal Outcome Evaluation:  Plan of Care Reviewed With: patient

## 2024-01-11 NOTE — PLAN OF CARE
Goal Outcome Evaluation:  Plan of Care Reviewed With: patient        Progress: no change. Wound care completed.

## 2024-01-11 NOTE — TELEPHONE ENCOUNTER
Called Dr. Humphreys office about clearance for pt, was told pt needs an appt for this.attempted to call pt no answer or vm set up.

## 2024-01-11 NOTE — PROGRESS NOTES
[facility]     Progress Note    Patient Name: Lona Rodríguez  : 1950  MRN: [id number]  Primary Care Physician:  Christopher Thao MD  Date of admission: 2024      Subjective   Brief summary.  Patient admitted with severe hypokalemia and paralysis      HPI:  Patient had rough night could not sleep because of leg cramps, Robaxin given but did not help.  Patient says she was taking Robaxin and was helping before,    Review of Systems     Fatigue and weakness, no urinary symptoms, no burning.  No chest pain or palpitations      Objective     Vitals:   Temp:  [97.2 °F (36.2 °C)-98.1 °F (36.7 °C)] 98.1 °F (36.7 °C)  Heart Rate:  [87-98] 98  Resp:  [15-18] 18  BP: (101-119)/(56-67) 118/65    Physical Exam :     Elderly female not in acute distress.  Awake alert and oriented.  Heart regular.  Lungs clear.  Abdomen soft obese.  Extremities trace of edema      Result Review:  I have personally reviewed the results from the time of this admission to 2024 17:36 EST and agree with these findings:  []  Laboratory  []  Microbiology  []  Radiology  []  EKG/Telemetry   []  Cardiology/Vascular   []  Pathology  []  Old records  []  Other:       Potassium 3.2    Assessment / Plan       Active Hospital Problems:  Active Hospital Problems    Diagnosis    • Acute UTI (urinary tract infection)    • Periodic paralysis    • Hypokalemia        Plan:   Potassium normalized, at this point we will will discontinue potassium supplement and restart Aldactone and repeat labs in AM.  Advised to increase activity.  PT and OT consulted.    DVT prophylaxis:  Medical DVT prophylaxis orders are present.    CODE STATUS:   Code Status (Patient has no pulse and is not breathing): CPR (Attempt to Resuscitate)  Medical Interventions (Patient has pulse or is breathing): Full Support              Electronically signed by Luiz Diana MD, 24, 5:37 PM EST.

## 2024-01-11 NOTE — PLAN OF CARE
Goal Outcome Evaluation:  Plan of Care Reviewed With: patient        Progress: improving  Outcome Evaluation: Patient with good participation in therapy session. Patient demonstrates increased motivation this session, requiring decreased assist with transfers. Patient continues to reports left hip pain, impacting patient participation this session. Patient completes x5 STS, requiring moderate assist with transfers. Patient will benefit from continued skilled OT intervention to promote return to baseline.      Anticipated Discharge Disposition (OT): inpatient rehabilitation facility

## 2024-01-11 NOTE — THERAPY EVALUATION
Patient Name: Lona Rodríguez  : 1950    MRN: [id number]                              Today's Date: 2024       Admit Date: 2024    Visit Dx:     ICD-10-CM ICD-9-CM   1. Acute hypokalemia  E87.6 276.8   2. Hypokalemic periodic paralysis  G72.3 359.3   3. Elevated LFTs  R79.89 790.6   4. Acute UTI (urinary tract infection)  N39.0 599.0   5. Decreased activities of daily living (ADL)  Z78.9 V49.89   6. Difficulty in walking  R26.2 719.7     Patient Active Problem List   Diagnosis    Intractable pain    Wrist fracture    Rib fractures    Lung nodule    Dyspnea    Closed fracture of one rib, unspecified laterality, initial encounter    Breast asymmetry in female    Breast skin changes    GERD (gastroesophageal reflux disease)    Chronic nausea    Pedal edema    Esophagitis determined by endoscopy    Hypokalemia    Periodic paralysis    Acute UTI (urinary tract infection)     Past Medical History:   Diagnosis Date    AMD (age related macular degeneration)     Anemia Following surgery    Arthritis     Asthma     Breast cancer Left breast.    Breast mass 2 lumpectomies 1 cancerous 1 benign    CHF (congestive heart failure) Just getting evaluation    Colon polyp Removed during colonos    COPD (chronic obstructive pulmonary disease)     Coronary artery disease Chf    Deep vein thrombosis 1979    Fibrocystic breast Left breast    Fibromyalgia     History of transfusion Aug 2019 2 units    Hyperlipidemia     Hypertension     Nausea and vomiting 2023    Obesity     Polymyalgia arteritica     Postoperative wound infection Lymph node resection site,5th toe left foot infection after neuroma surgery. 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    Sciatica      Past Surgical History:   Procedure Laterality Date    BREAST BIOPSY  Left    BREAST SURGERY  Lumpectomies left breast    ENDOSCOPY N/A 2023    Procedure: ESOPHAGOGASTRODUODENOSCOPY WITH BIOPSIES;  Surgeon: Maximino Aceves MD;  Location: [facility] ENDOSCOPY;  Service:  Gastroenterology;  Laterality: N/A;  ESOPHAGITIS, GASTRITIS, HIATAL HERNIA    EYE SURGERY  Lens implants both eyes    REPLACEMENT TOTAL KNEE      TONSILLECTOMY        General Information       Row Name 01/11/24 1527          OT Time and Intention    Document Type therapy note (daily note)  -ES     Mode of Treatment individual therapy;occupational therapy  -ES       Row Name 01/11/24 1527          General Information    Existing Precautions/Restrictions fall  -ES       Row Name 01/11/24 1527          Cognition    Orientation Status (Cognition) oriented x 3  patient cooperative, agreeable to therapy participation.  -ES       Row Name 01/11/24 1527          Safety Issues, Functional Mobility    Impairments Affecting Function (Mobility) balance;strength;endurance/activity tolerance;pain  -ES               User Key  (r) = Recorded By, (t) = Taken By, (c) = Cosigned By      Initials Name Provider Type    ES Shania Rushing, OTR/L, CSRS Occupational Therapist                     Mobility/ADL's       Row Name 01/11/24 1528          Bed Mobility    Bed Mobility supine-sit;sit-supine  -ES     Supine-Sit Swain (Bed Mobility) 1 person assist;moderate assist (50% patient effort)  -ES     Sit-Supine Swain (Bed Mobility) 1 person assist;moderate assist (50% patient effort)  -ES     Bed Mobility, Safety Issues decreased use of arms for pushing/pulling;decreased use of legs for bridging/pushing  -ES     Assistive Device (Bed Mobility) draw sheet;head of bed elevated  -ES       Row Name 01/11/24 1528          Transfers    Transfers sit-stand transfer;stand-sit transfer  -ES     Comment, (Transfers) bed height elevated to assist with sit <> stand. Patient completes STS x5.  -ES       Row Name 01/11/24 1528          Sit-Stand Transfer    Sit-Stand Swain (Transfers) moderate assist (50% patient effort);1 person assist  -ES     Assistive Device (Sit-Stand Transfers) walker, front-wheeled  -ES               User Key   (r) = Recorded By, (t) = Taken By, (c) = Cosigned By      Initials Name Provider Type    Shania Grossman OTR/L, RADHA Occupational Therapist                   Obj/Interventions       Row Name 01/11/24 1530          Motor Skills    Therapeutic Exercise other (see comments)  Patient completes LE exercises seated edge of bed in preperation for STS. Patient reports pain in LLE with standing.  -ES               User Key  (r) = Recorded By, (t) = Taken By, (c) = Cosigned By      Initials Name Provider Type    Shania Grossman OTR/L, RADHA Occupational Therapist                   Goals/Plan    No documentation.                  Clinical Impression       Row Name 01/11/24 1534          Plan of Care Review    Plan of Care Reviewed With patient  -ES     Progress improving  -ES     Outcome Evaluation Patient with good participation in therapy session. Patient demonstrates increased motivation this session, requiring decreased assist with transfers. Patient continues to reports left hip pain, impacting patient participation this session. Patient completes x5 STS, requiring moderate assist with transfers. Patient will benefit from continued skilled OT intervention to promote return to baseline.  -ES       Row Name 01/11/24 1539          Positioning and Restraints    Pre-Treatment Position in bed  -ES     Post Treatment Position bed  -ES               User Key  (r) = Recorded By, (t) = Taken By, (c) = Cosigned By      Initials Name Provider Type    Shania Grossman OTR/L, RADHA Occupational Therapist                   Outcome Measures       Row Name 01/11/24 1536          How much help from another is currently needed...    Putting on and taking off regular lower body clothing? 2  -ES     Bathing (including washing, rinsing, and drying) 2  -ES     Toileting (which includes using toilet bed pan or urinal) 2  -ES     Putting on and taking off regular upper body clothing 3  -ES     Taking care of personal grooming (such as brushing  teeth) 3  -ES     Eating meals 4  -ES     AM-PAC 6 Clicks Score (OT) 16  -ES       Row Name 01/11/24 1200 01/11/24 0730       How much help from another person do you currently need...    Turning from your back to your side while in flat bed without using bedrails? 2  -LEOBARDO 2  -JH    Moving from lying on back to sitting on the side of a flat bed without bedrails? 2  -LEOBARDO 2  -JH    Moving to and from a bed to a chair (including a wheelchair)? 2  -LEOBARDO 2  -JH    Standing up from a chair using your arms (e.g., wheelchair, bedside chair)? 2  -LEOBARDO 2  -JH    Climbing 3-5 steps with a railing? 1  -LEOBARDO 2  -JH    To walk in hospital room? 2  -LEOBARDO 2  -JH    AM-PAC 6 Clicks Score (PT) 11  -LEOBARDO 12  -JH    Highest Level of Mobility Goal 4 --> Transfer to chair/commode  -LEOBARDO 4 --> Transfer to chair/commode  -JH      Row Name 01/11/24 1539 01/11/24 1200       Functional Assessment    Outcome Measure Options AM-PAC 6 Clicks Daily Activity (OT)  -ES AM-PAC 6 Clicks Basic Mobility (PT)  -LEOBARDO              User Key  (r) = Recorded By, (t) = Taken By, (c) = Cosigned By      Initials Name Provider Type    Madelaine Orourke, RN Registered Nurse    Kar Wilks, PT Physical Therapist    Shania Grossman, OTR/L, CSRS Occupational Therapist                      OT Recommendation and Plan  Planned Therapy Interventions (OT): activity tolerance training, BADL retraining, functional balance retraining, occupation/activity based interventions, patient/caregiver education/training, strengthening exercise, transfer/mobility retraining  Therapy Frequency (OT): 5 times/wk  Plan of Care Review  Plan of Care Reviewed With: patient  Progress: improving  Outcome Evaluation: Patient with good participation in therapy session. Patient demonstrates increased motivation this session, requiring decreased assist with transfers. Patient continues to reports left hip pain, impacting patient participation this session. Patient completes x5 STS, requiring moderate  assist with transfers. Patient will benefit from continued skilled OT intervention to promote return to baseline.     Time Calculation:   Evaluation Complexity (OT)  Review Occupational Profile/Medical/Therapy History Complexity: brief/low complexity  Assessment, Occupational Performance/Identification of Deficit Complexity: 3-5 performance deficits  Clinical Decision Making Complexity (OT): problem focused assessment/low complexity  Overall Complexity of Evaluation (OT): low complexity     Time Calculation- OT       Row Name 01/11/24 1539             Time Calculation- OT    OT Received On 01/11/24  -ES      OT Goal Re-Cert Due Date 01/19/24  -ES         Timed Charges    74326 - OT Therapeutic Activity Minutes 25  -ES         Total Minutes    Timed Charges Total Minutes 25  -ES       Total Minutes 25  -ES                User Key  (r) = Recorded By, (t) = Taken By, (c) = Cosigned By      Initials Name Provider Type    ES Shania Rushing, OTR/L, CSRS Occupational Therapist                  Therapy Charges for Today       Code Description Service Date Service Provider Modifiers Qty    93504584804 HC OT EVAL LOW COMPLEXITY 3 1/10/2024 Shania Rushing OTR/L, CSRS GO 1    [id number]  OT THERAPEUTIC ACT EA 15 MIN 1/11/2024 Shania Rushing OTR/L, CSRS GO 2                 Shania Rushing OTR/L, CSRS  1/11/2024

## 2024-01-11 NOTE — THERAPY EVALUATION
Acute Care - Physical Therapy Initial Evaluation  CATALINA Rodriguez     Patient Name: Lona Rodríguez  : 1950  MRN: [id number]  Today's Date: 2024     Admit date: 2024     Referring Physician: Luiz Diana MD     Surgery Date:* No surgery found *             Visit Dx:     ICD-10-CM ICD-9-CM   1. Acute hypokalemia  E87.6 276.8   2. Hypokalemic periodic paralysis  G72.3 359.3   3. Elevated LFTs  R79.89 790.6   4. Acute UTI (urinary tract infection)  N39.0 599.0   5. Decreased activities of daily living (ADL)  Z78.9 V49.89   6. Difficulty in walking  R26.2 719.7     Patient Active Problem List   Diagnosis    Intractable pain    Wrist fracture    Rib fractures    Lung nodule    Dyspnea    Closed fracture of one rib, unspecified laterality, initial encounter    Breast asymmetry in female    Breast skin changes    GERD (gastroesophageal reflux disease)    Chronic nausea    Pedal edema    Esophagitis determined by endoscopy    Hypokalemia    Periodic paralysis    Acute UTI (urinary tract infection)     Past Medical History:   Diagnosis Date    AMD (age related macular degeneration)     Anemia Following surgery    Arthritis     Asthma     Breast cancer Left breast.    Breast mass 2 lumpectomies 1 cancerous 1 benign    CHF (congestive heart failure) Just getting evaluation    Colon polyp Removed during colonos    COPD (chronic obstructive pulmonary disease)     Coronary artery disease Chf    Deep vein thrombosis 1979    Fibrocystic breast Left breast    Fibromyalgia     History of transfusion Aug 2019 2 units    Hyperlipidemia     Hypertension     Nausea and vomiting 2023    Obesity     Polymyalgia arteritica     Postoperative wound infection Lymph node resection site,5th toe left foot infection after neuroma surgery. 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    Sciatica      Past Surgical History:   Procedure Laterality Date    BREAST BIOPSY  Left    BREAST SURGERY  Lumpectomies left breast    ENDOSCOPY N/A 2023     Procedure: ESOPHAGOGASTRODUODENOSCOPY WITH BIOPSIES;  Surgeon: Maximino Aceves MD;  Location: [facility] ENDOSCOPY;  Service: Gastroenterology;  Laterality: N/A;  ESOPHAGITIS, GASTRITIS, HIATAL HERNIA    EYE SURGERY  Lens implants both eyes    REPLACEMENT TOTAL KNEE      TONSILLECTOMY       PT Assessment (last 12 hours)       PT Evaluation and Treatment       Row Name 01/11/24 1200          Physical Therapy Time and Intention    Subjective Information no complaints  -LEOBARDO     Document Type evaluation  -LEOBARDO     Mode of Treatment individual therapy;physical therapy  -LEOBARDO     Patient Effort good  -LEOBARDO       Row Name 01/11/24 1200          General Information    Patient Observations alert;cooperative;agree to therapy  -LEOBARDO     Prior Level of Function independent:;all household mobility;community mobility  -LEOBARDO     Equipment Currently Used at Home walker, rolling;wheelchair  -LEOBARDO     Existing Precautions/Restrictions fall  -LEOBARDO     Barriers to Rehab none identified  -LEOBARDO       Row Name 01/11/24 1200          Living Environment    Current Living Arrangements home  -LEOBARDO       Row Name 01/11/24 1200          Range of Motion (ROM)    Range of Motion ROM is WFL  -LEOBARDO       Row Name 01/11/24 1200          Strength (Manual Muscle Testing)    Strength (Manual Muscle Testing) bilateral lower extremities  RLE 3-/5; LLE 2-/5  -LEOBARDO       Row Name 01/11/24 1200          Bed Mobility    Bed Mobility bed mobility (all) activities;supine-sit  -LEOBARDO     All Activities, [place] (Bed Mobility) maximum assist (25% patient effort)  -LEOBARDO     Supine-Sit [place] (Bed Mobility) maximum assist (25% patient effort)  -LEOBARDO       Row Name 01/11/24 1200          Transfers    Transfers sit-stand transfer  -LEOBARDO       Row Name 01/11/24 1200          Sit-Stand Transfer    Sit-Stand [place] (Transfers) maximum assist (25% patient effort)  -LEOBARDO     Assistive Device (Sit-Stand Transfers) walker, front-wheeled  -LEOBARDO       Row Name 01/11/24 1200          Gait/Stairs  (Locomotion)    Comment, (Gait/Stairs) unable to ambulated due to weakness and significant pain with LLE WB  -LEOBARDO       Row Name 01/11/24 1200          Safety Issues, Functional Mobility    Impairments Affecting Function (Mobility) balance;strength;endurance/activity tolerance;pain  -LEOBARDO       Row Name 01/11/24 1200          Balance    Balance Assessment standing static balance  -LEOBARDO     Dynamic Sitting Balance maximum assist  -LEOBARDO       Row Name             Wound 01/09/24 0120 Left gluteal    Wound - Properties Group Placement Date: 01/09/24  -LK Placement Time: 0120  -LK Present on Original Admission: Y  -LK Side: Left  -LK Location: gluteal  -LK    Retired Wound - Properties Group Placement Date: 01/09/24  -LK Placement Time: 0120  -LK Present on Original Admission: Y  -LK Side: Left  -LK Location: gluteal  -LK    Retired Wound - Properties Group Date first assessed: 01/09/24  -LK Time first assessed: 0120  -LK Present on Original Admission: Y  -LK Side: Left  -LK Location: gluteal  -LK      Row Name             Wound 01/09/24 1008 Left anterior hip Pressure Injury    Wound - Properties Group Placement Date: 01/09/24  -NH Placement Time: 1008  -NH Side: Left  -NH Orientation: anterior  -NH Location: hip  -NH Primary Wound Type: Pressure inj  -NH    Retired Wound - Properties Group Placement Date: 01/09/24  -NH Placement Time: 1008  -NH Side: Left  -NH Orientation: anterior  -NH Location: hip  -NH Primary Wound Type: Pressure inj  -NH    Retired Wound - Properties Group Date first assessed: 01/09/24  -NH Time first assessed: 1008  -NH Side: Left  -NH Location: hip  -NH Primary Wound Type: Pressure inj  -NH      Row Name             Wound 01/09/24 1008 Right anterior hip Pressure Injury    Wound - Properties Group Placement Date: 01/09/24  -NH Placement Time: 1008  -NH Side: Right  -NH Orientation: anterior  -NH Location: hip  -NH Primary Wound Type: Pressure inj  -NH    Retired Wound - Properties Group Placement Date:  01/09/24  -NH Placement Time: 1008  -NH Side: Right  -NH Orientation: anterior  -NH Location: hip  -NH Primary Wound Type: Pressure inj  -NH    Retired Wound - Properties Group Date first assessed: 01/09/24  -NH Time first assessed: 1008  -NH Side: Right  -NH Location: hip  -NH Primary Wound Type: Pressure inj  -NH      Row Name             Wound 01/09/24 1008 Left anterior foot Pressure Injury    Wound - Properties Group Placement Date: 01/09/24  -NH Placement Time: 1008  -NH Side: Left  -NH Orientation: anterior  -NH Location: foot  -NH Primary Wound Type: Pressure inj  -NH    Retired Wound - Properties Group Placement Date: 01/09/24  -NH Placement Time: 1008  -NH Side: Left  -NH Orientation: anterior  -NH Location: foot  -NH Primary Wound Type: Pressure inj  -NH    Retired Wound - Properties Group Date first assessed: 01/09/24  -NH Time first assessed: 1008  -NH Side: Left  -NH Location: foot  -NH Primary Wound Type: Pressure inj  -NH      Row Name             Wound 01/09/24 1008 Right gluteal Pressure Injury    Wound - Properties Group Placement Date: 01/09/24  -NH Placement Time: 1008  -NH Side: Right  -NH Location: gluteal  -NH Primary Wound Type: Pressure inj  -NH    Retired Wound - Properties Group Placement Date: 01/09/24  -NH Placement Time: 1008  -NH Side: Right  -NH Location: gluteal  -NH Primary Wound Type: Pressure inj  -NH    Retired Wound - Properties Group Date first assessed: 01/09/24  -NH Time first assessed: 1008  -NH Side: Right  -NH Location: gluteal  -NH Primary Wound Type: Pressure inj  -NH      Row Name             Wound 01/09/24 1008 Right posterior thigh Pressure Injury    Wound - Properties Group Placement Date: 01/09/24  -NH Placement Time: 1008  -NH Side: Right  -NH Orientation: posterior  -NH Location: thigh  -NH Primary Wound Type: Pressure inj  -NH    Retired Wound - Properties Group Placement Date: 01/09/24  -NH Placement Time: 1008  -NH Side: Right  -NH Orientation: posterior  -NH  Location: thigh  -NH Primary Wound Type: Pressure inj  -NH    Retired Wound - Properties Group Date first assessed: 01/09/24  -NH Time first assessed: 1008  -NH Side: Right  -NH Location: thigh  -NH Primary Wound Type: Pressure inj  -NH      Row Name 01/11/24 1200          Plan of Care Review    Plan of Care Reviewed With patient  -LEOBARDO     Outcome Evaluation Patient presents with decreased strength, transfers and ambulation.  Skilled physical therapy services will be required to address these mobility deficits.  -LEOBARDO       Row Name 01/11/24 1200          Therapy Assessment/Plan (PT)    Rehab Potential (PT) good, to achieve stated therapy goals  -LEOBRADO     Criteria for Skilled Interventions Met (PT) skilled treatment is necessary  -LEOBARDO     Therapy Frequency (PT) daily  -LEOBARDO     Predicted Duration of Therapy Intervention (PT) 10 days  -LEOBARDO     Problem List (PT) problems related to;balance;mobility;strength;pain  -LEOBARDO     Activity Limitations Related to Problem List (PT) unable to ambulate safely;unable to transfer safely  -LEOBARDO       Row Name 01/11/24 1200          PT Evaluation Complexity    History, PT Evaluation Complexity no personal factors and/or comorbidities  -LEOBARDO     Examination of Body Systems (PT Eval Complexity) total of 4 or more elements  -LEOBARDO     Clinical Presentation (PT Evaluation Complexity) stable  -LEOBARDO     Clinical Decision Making (PT Evaluation Complexity) low complexity  -LEOBARDO     Overall Complexity (PT Evaluation Complexity) low complexity  -LEOBARDO       Row Name 01/11/24 1200          Therapy Plan Review/Discharge Plan (PT)    Therapy Plan Review (PT) evaluation/treatment results reviewed;participants voiced agreement with care plan;participants included;patient  -LEOBARDO       Row Name 01/11/24 1200          Physical Therapy Goals    Transfer Goal Selection (PT) transfer, PT goal 1  -LEOBARDO     Gait Training Goal Selection (PT) gait training, PT goal 1  -LEOBARDO       Row Name 01/11/24 1200          Transfer Goal 1 (PT)     Activity/Assistive Device (Transfer Goal 1, PT) transfers, all  -LEOBARDO     [place] Level/Cues Needed (Transfer Goal 1, PT) independent  -LEOBARDO     Time Frame (Transfer Goal 1, PT) long term goal (LTG);10 days  -LEOBARDO       Row Name 01/11/24 1200          Gait Training Goal 1 (PT)    Activity/Assistive Device (Gait Training Goal 1, PT) gait (walking locomotion);assistive device use;walker, rolling  -LEOBARDO     [place] Level (Gait Training Goal 1, PT) independent  -LEOBARDO     Distance (Gait Training Goal 1, PT) 300  -LEOBARDO     Time Frame (Gait Training Goal 1, PT) long term goal (LTG);10 days  -LEOBARDO               User Key  (r) = Recorded By, (t) = Taken By, (c) = Cosigned By      Initials Name Provider Type    Kylah Long, RN Registered Nurse    Kar Wilks, PT Physical Therapist    Erica Patel RN Registered Nurse                      PT Recommendation and Plan  Anticipated Discharge Disposition (PT): sub acute care setting  Planned Therapy Interventions (PT): balance training, bed mobility training, gait training, home exercise program, stair training, strengthening, transfer training  Therapy Frequency (PT): daily  Plan of Care Reviewed With: patient  Outcome Evaluation: Patient presents with decreased strength, transfers and ambulation.  Skilled physical therapy services will be required to address these mobility deficits.   Outcome Measures       Row Name 01/11/24 1200             How much help from another person do you currently need...    Turning from your back to your side while in flat bed without using bedrails? 2  -LEOBARDO      Moving from lying on back to sitting on the side of a flat bed without bedrails? 2  -LEOBARDO      Moving to and from a bed to a chair (including a wheelchair)? 2  -LEOBARDO      Standing up from a chair using your arms (e.g., wheelchair, bedside chair)? 2  -LEOBARDO      Climbing 3-5 steps with a railing? 1  -LEOBARDO      To walk in hospital room? 2  -LEOBARDO      AM-PAC 6 Clicks Score (PT) 11  -LEOBARDO       Highest Level of Mobility Goal 4 --> Transfer to chair/commode  -LEOBARDO         Functional Assessment    Outcome Measure Options AM-PAC 6 Clicks Basic Mobility (PT)  -LEOBARDO                User Key  (r) = Recorded By, (t) = Taken By, (c) = Cosigned By      Initials Name Provider Type    Kar Wilks, PT Physical Therapist                     Time Calculation:    PT Charges       Row Name 01/11/24 1250             Time Calculation    PT Received On 01/11/24  -LEOBARDO      PT Goal Re-Cert Due Date 01/20/24  -LEOBARDO         Untimed Charges    PT Eval/Re-eval Minutes 20  -LEOBARDO         Total Minutes    Untimed Charges Total Minutes 20  -LEOBARDO       Total Minutes 20  -LEOBARDO                User Key  (r) = Recorded By, (t) = Taken By, (c) = Cosigned By      Initials Name Provider Type    Kar Wilks PT Physical Therapist                  Therapy Charges for Today       Code Description Service Date Service Provider Modifiers Qty    95898344449 HC PT EVAL LOW COMPLEXITY 2 1/11/2024 Kar Butt PT GP 1            PT G-Codes  Outcome Measure Options: AM-PAC 6 Clicks Basic Mobility (PT)  AM-PAC 6 Clicks Score (PT): 11  AM-PAC 6 Clicks Score (OT): 16    Kar Butt PT  1/11/2024

## 2024-01-12 PROBLEM — L89.92 PRESSURE ULCER, STAGE 2: Status: ACTIVE | Noted: 2024-01-12

## 2024-01-12 LAB
ANION GAP SERPL CALCULATED.3IONS-SCNC: 6.7 MMOL/L (ref 5–15)
BUN SERPL-MCNC: 9 MG/DL (ref 8–23)
BUN/CREAT SERPL: 15 (ref 7–25)
CALCIUM SPEC-SCNC: 8.1 MG/DL (ref 8.6–10.5)
CHLORIDE SERPL-SCNC: 100 MMOL/L (ref 98–107)
CO2 SERPL-SCNC: 28.3 MMOL/L (ref 22–29)
CREAT SERPL-MCNC: 0.6 MG/DL (ref 0.57–1)
EGFRCR SERPLBLD CKD-EPI 2021: 94.9 ML/MIN/1.73
GLUCOSE SERPL-MCNC: 110 MG/DL (ref 65–99)
POTASSIUM SERPL-SCNC: 4.4 MMOL/L (ref 3.5–5.2)
SODIUM SERPL-SCNC: 135 MMOL/L (ref 136–145)

## 2024-01-12 PROCEDURE — 97530 THERAPEUTIC ACTIVITIES: CPT

## 2024-01-12 PROCEDURE — 25010000002 ENOXAPARIN PER 10 MG: Performed by: INTERNAL MEDICINE

## 2024-01-12 PROCEDURE — 80048 BASIC METABOLIC PNL TOTAL CA: CPT | Performed by: INTERNAL MEDICINE

## 2024-01-12 PROCEDURE — 25010000002 CEFTRIAXONE PER 250 MG: Performed by: INTERNAL MEDICINE

## 2024-01-12 RX ADMIN — HYDROCODONE BITARTRATE AND ACETAMINOPHEN 1 TABLET: 5; 325 TABLET ORAL at 18:48

## 2024-01-12 RX ADMIN — METHOCARBAMOL 500 MG: 500 TABLET ORAL at 20:16

## 2024-01-12 RX ADMIN — HYDROCODONE BITARTRATE AND ACETAMINOPHEN 1 TABLET: 5; 325 TABLET ORAL at 02:41

## 2024-01-12 RX ADMIN — KETOROLAC TROMETHAMINE 10 MG: 10 TABLET, FILM COATED ORAL at 12:17

## 2024-01-12 RX ADMIN — DOCUSATE SODIUM 50MG AND SENNOSIDES 8.6MG 2 TABLET: 8.6; 5 TABLET, FILM COATED ORAL at 20:17

## 2024-01-12 RX ADMIN — FAMOTIDINE 20 MG: 20 TABLET ORAL at 08:25

## 2024-01-12 RX ADMIN — LEVOTHYROXINE SODIUM 75 MCG: 75 TABLET ORAL at 05:51

## 2024-01-12 RX ADMIN — Medication 10 ML: at 20:16

## 2024-01-12 RX ADMIN — KETOROLAC TROMETHAMINE 10 MG: 10 TABLET, FILM COATED ORAL at 23:40

## 2024-01-12 RX ADMIN — MONTELUKAST 10 MG: 10 TABLET, FILM COATED ORAL at 20:16

## 2024-01-12 RX ADMIN — AMITRIPTYLINE HYDROCHLORIDE 50 MG: 50 TABLET, FILM COATED ORAL at 20:16

## 2024-01-12 RX ADMIN — ENOXAPARIN SODIUM 40 MG: 100 INJECTION SUBCUTANEOUS at 08:25

## 2024-01-12 RX ADMIN — ATORVASTATIN CALCIUM 20 MG: 20 TABLET, FILM COATED ORAL at 20:16

## 2024-01-12 RX ADMIN — Medication 10 ML: at 08:26

## 2024-01-12 RX ADMIN — METHOCARBAMOL 500 MG: 500 TABLET ORAL at 08:25

## 2024-01-12 RX ADMIN — CEFTRIAXONE SODIUM 2000 MG: 2 INJECTION, POWDER, FOR SOLUTION INTRAMUSCULAR; INTRAVENOUS at 09:50

## 2024-01-12 RX ADMIN — SPIRONOLACTONE 25 MG: 25 TABLET ORAL at 08:26

## 2024-01-12 NOTE — PLAN OF CARE
Goal Outcome Evaluation:           Progress: no change  Outcome Evaluation: VSS. Medicated for pain per MAR x2. Patient turned q2 hours. No acute changes.

## 2024-01-12 NOTE — PRE-PROCEDURE INSTRUCTIONS
"Instructed on date and arrival time of 1000. Come to entrance \"C\".  Must have  over age 18 to drive home.  May have two visitors; however, children under 12 must stay in waiting room.  Discussed diet/NPO.  May take medications as usual except for blood thinners, diabetic medications, or weight loss medications.  Bring list of medications to hospital.  Verbalized understanding of instructions given.  Instructed to call for questions or concerns.  Awaiting cardiac clearance.  "

## 2024-01-12 NOTE — THERAPY TREATMENT NOTE
Patient Name: Lona Rodríguez  : 1950    MRN: [id number]                              Today's Date: 2024       Admit Date: 2024    Visit Dx:     ICD-10-CM ICD-9-CM   1. Acute hypokalemia  E87.6 276.8   2. Hypokalemic periodic paralysis  G72.3 359.3   3. Elevated LFTs  R79.89 790.6   4. Acute UTI (urinary tract infection)  N39.0 599.0   5. Decreased activities of daily living (ADL)  Z78.9 V49.89   6. Difficulty in walking  R26.2 719.7     Patient Active Problem List   Diagnosis    Intractable pain    Wrist fracture    Rib fractures    Lung nodule    Dyspnea    Closed fracture of one rib, unspecified laterality, initial encounter    Breast asymmetry in female    Breast skin changes    GERD (gastroesophageal reflux disease)    Chronic nausea    Pedal edema    Esophagitis determined by endoscopy    Hypokalemia    Periodic paralysis    Acute UTI (urinary tract infection)     Past Medical History:   Diagnosis Date    AMD (age related macular degeneration)     Anemia Following surgery    Arthritis     Asthma     Breast cancer Left breast.    Breast mass 2 lumpectomies 1 cancerous 1 benign    CHF (congestive heart failure) Just getting evaluation    Colon polyp Removed during colonos    COPD (chronic obstructive pulmonary disease)     Coronary artery disease Chf    Deep vein thrombosis 1979    Fibrocystic breast Left breast    Fibromyalgia     History of transfusion Aug 2019 2 units    Hyperlipidemia     Hypertension     Nausea and vomiting 2023    Obesity     Polymyalgia arteritica     Postoperative wound infection Lymph node resection site,5th toe left foot infection after neuroma surgery. 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    Sciatica      Past Surgical History:   Procedure Laterality Date    BREAST BIOPSY  Left    BREAST SURGERY  Lumpectomies left breast    ENDOSCOPY N/A 2023    Procedure: ESOPHAGOGASTRODUODENOSCOPY WITH BIOPSIES;  Surgeon: Maximino Aceves MD;  Location: [facility] ENDOSCOPY;  Service:  Gastroenterology;  Laterality: N/A;  ESOPHAGITIS, GASTRITIS, HIATAL HERNIA    EYE SURGERY  Lens implants both eyes    REPLACEMENT TOTAL KNEE      TONSILLECTOMY        General Information       Row Name 01/12/24 1401          OT Time and Intention    Document Type therapy note (daily note)  -ES     Mode of Treatment individual therapy;occupational therapy  -ES       Row Name 01/12/24 1401          General Information    Existing Precautions/Restrictions fall  -ES       Row Name 01/12/24 1401          Cognition    Orientation Status (Cognition) oriented x 3  Patient pleasant and cooperative, agreeable to therapy participation. Patient is motivated this session for funcitonal improvements.  -ES       Row Name 01/12/24 1401          Safety Issues, Functional Mobility    Impairments Affecting Function (Mobility) balance;strength;endurance/activity tolerance;pain  -ES               User Key  (r) = Recorded By, (t) = Taken By, (c) = Cosigned By      Initials Name Provider Type    ES Shania Rushing, OTR/L, CSRS Occupational Therapist                     Mobility/ADL's       Row Name 01/12/24 1401          Bed Mobility    Bed Mobility supine-sit  -ES     Supine-Sit [place] (Bed Mobility) moderate assist (50% patient effort);1 person assist  -ES     Bed Mobility, Safety Issues decreased use of arms for pushing/pulling;decreased use of legs for bridging/pushing  -ES     Assistive Device (Bed Mobility) bed rails;head of bed elevated  -ES       Row Name 01/12/24 1401          Transfers    Transfers sit-stand transfer;stand-sit transfer  -ES       Row Name 01/12/24 1401          Sit-Stand Transfer    Sit-Stand [place] (Transfers) moderate assist (50% patient effort);1 person assist  -ES     Assistive Device (Sit-Stand Transfers) walker, front-wheeled  -ES       Row Name 01/12/24 1401          Stand-Sit Transfer    Stand-Sit [place] (Transfers) moderate assist (50% patient effort);1 person assist  -ES     Assistive  Device (Stand-Sit Transfers) walker, front-wheeled  -ES       Row Name 01/12/24 1401          Functional Mobility    Functional Mobility- Ind. Level 1 person  -ES     Functional Mobility- Device walker, front-wheeled  -ES     Functional Mobility- Comment Patient takes x4 steps from edge of bed to bed side recliner, moderate assist with verbal cueing for LE advancement and assist with LLE advancement. Patient is fearful of falling with all mobility secondary to recent falls at home.  -ES               User Key  (r) = Recorded By, (t) = Taken By, (c) = Cosigned By      Initials Name Provider Type    Shania Grossman, KAYR/L, RADHA Occupational Therapist                   Obj/Interventions       Row Name 01/12/24 1408          Balance    Balance Assessment sitting dynamic balance;standing dynamic balance  -ES     Dynamic Sitting Balance contact guard  -ES     Position, Sitting Balance unsupported;sitting edge of bed  -ES     Dynamic Standing Balance moderate assist;1-person assist  -ES     Position/Device Used, Standing Balance supported;walker, front-wheeled  -ES     Comment, Balance Patient completes dynamic sitting balance task seated edge of bed completing hip hinge 2 x 10 with forward reach for core strength and LE activation in preperation for STS and transfers. Patient tolerates well, continues to report pain in left hip with forward flexion  -ES               User Key  (r) = Recorded By, (t) = Taken By, (c) = Cosigned By      Initials Name Provider Type    Shania Grossman, KAYR/L, RADHA Occupational Therapist                   Goals/Plan    No documentation.                  Clinical Impression       Row Name 01/12/24 1406          Plan of Care Review    Plan of Care Reviewed With patient  -ES     Outcome Evaluation Patient with good participation in therapy session. Patient continues to demonstrate motivation for functional improvement. This session, patient is able to complete STS x4, requiring decreased level  of assist. Patient completes short distance functional mobility, however pain in L hip continues to limit patient. Secondary to frequent recent falls, patient demonstrates fear of falling, limiting therapy participation. patient would benefit from continued skilled OT intervention to promote return to baseline.  -ES       Row Name 01/12/24 1409          Positioning and Restraints    Pre-Treatment Position in bed  -ES     Post Treatment Position chair  -ES     In Chair reclined;call light within reach;encouraged to call for assist;exit alarm on  -ES               User Key  (r) = Recorded By, (t) = Taken By, (c) = Cosigned By      Initials Name Provider Type    ES Shania Rushing, OTR/L, CSRS Occupational Therapist                   Outcome Measures       Row Name 01/12/24 1417          How much help from another is currently needed...    Putting on and taking off regular lower body clothing? 2  -ES     Bathing (including washing, rinsing, and drying) 2  -ES     Toileting (which includes using toilet bed pan or urinal) 2  -ES     Putting on and taking off regular upper body clothing 3  -ES     Taking care of personal grooming (such as brushing teeth) 3  -ES     Eating meals 4  -ES     AM-PAC 6 Clicks Score (OT) 16  -ES       Row Name 01/12/24 0746          How much help from another person do you currently need...    Turning from your back to your side while in flat bed without using bedrails? 2  -RB     Moving from lying on back to sitting on the side of a flat bed without bedrails? 2  -RB     Moving to and from a bed to a chair (including a wheelchair)? 2  -RB     Standing up from a chair using your arms (e.g., wheelchair, bedside chair)? 2  -RB     Climbing 3-5 steps with a railing? 1  -RB     To walk in hospital room? 1  -RB     AM-PAC 6 Clicks Score (PT) 10  -RB     Highest Level of Mobility Goal 4 --> Transfer to chair/commode  -RB       Row Name 01/12/24 1417          Functional Assessment    Outcome Measure  Options AM-PAC 6 Clicks Daily Activity (OT);Optimal Instrument  -ES       Row Name 01/12/24 1417          Optimal Instrument    Optimal Instrument Optimal - 3  -ES     Bending/Stooping 3  -ES     Balancing 3  -ES     Standing 3  -ES     From the list, choose the 3 activities you would most like to be able to do without any difficulty Bending/stooping;Standing;Balancing  -ES     Total Score Optimal - 3 9  -ES               User Key  (r) = Recorded By, (t) = Taken By, (c) = Cosigned By      Initials Name Provider Type    Malu Gamble, RN Registered Nurse    Shania Grossman, OTR/L, CSRS Occupational Therapist                      OT Recommendation and Plan  Planned Therapy Interventions (OT): activity tolerance training, BADL retraining, functional balance retraining, occupation/activity based interventions, patient/caregiver education/training, strengthening exercise, transfer/mobility retraining  Therapy Frequency (OT): 5 times/wk  Plan of Care Review  Plan of Care Reviewed With: patient  Progress: improving  Outcome Evaluation: Patient with good participation in therapy session. Patient continues to demonstrate motivation for functional improvement. This session, patient is able to complete STS x4, requiring decreased level of assist. Patient completes short distance functional mobility, however pain in L hip continues to limit patient. Secondary to frequent recent falls, patient demonstrates fear of falling, limiting therapy participation. patient would benefit from continued skilled OT intervention to promote return to baseline.     Time Calculation:   Evaluation Complexity (OT)  Review Occupational Profile/Medical/Therapy History Complexity: brief/low complexity  Assessment, Occupational Performance/Identification of Deficit Complexity: 3-5 performance deficits  Clinical Decision Making Complexity (OT): problem focused assessment/low complexity  Overall Complexity of Evaluation (OT): low complexity     Time  Calculation- OT       Row Name 01/12/24 1418             Time Calculation- OT    OT Received On 01/12/24  -ES      OT Goal Re-Cert Due Date 01/19/24  -ES         Timed Charges    97092 - OT Therapeutic Activity Minutes 24  -ES         Total Minutes    Timed Charges Total Minutes 24  -ES       Total Minutes 24  -ES                User Key  (r) = Recorded By, (t) = Taken By, (c) = Cosigned By      Initials Name Provider Type    ES Shania Rushing, OTR/L, CSRS Occupational Therapist                  Therapy Charges for Today       Code Description Service Date Service Provider Modifiers Qty    17138692050 HC OT THERAPEUTIC ACT EA 15 MIN 1/11/2024 Shania Rushing, OTR/L, CSRS GO 2    45652723967 HC OT THERAPEUTIC ACT EA 15 MIN 1/12/2024 Shania Rushing, OTR/L, CSRS GO 2                 Shania Rushing OTR/L, CSRS  1/12/2024

## 2024-01-12 NOTE — PROGRESS NOTES
[facility]     Progress Note    Patient Name: Lona Rodríguez  : 1950  MRN: [id number]  Primary Care Physician:  Christopher Thao MD  Date of admission: 2024      Subjective   Brief summary.  Patient admitted with severe hypokalemia and paralysis      HPI:  No new issues but continues to have complaints of aches and pains and skin breakdown in the back would like to stay in hospital or go to nursing home prefers to stay in hospital until she gets better, possibly until the ulcer heals.    Review of Systems     Fatigue and weakness, no urinary symptoms, no burning.  No chest pain or palpitations      Objective     Vitals:   Temp:  [97.7 °F (36.5 °C)-98.1 °F (36.7 °C)] 98.1 °F (36.7 °C)  Heart Rate:  [] 100  Resp:  [16-20] 18  BP: ()/(51-66) 107/61    Physical Exam :     Elderly female not in acute distress.    Awake alert and oriented.    Heart regular.  Lungs clear.  Abdomen soft obese.  Nontender.  Neuro awake alert and oriented.  Extremities trace of edema      Result Review:  I have personally reviewed the results from the time of this admission to 2024 18:18 EST and agree with these findings:  []  Laboratory  []  Microbiology  []  Radiology  []  EKG/Telemetry   []  Cardiology/Vascular   []  Pathology  []  Old records  []  Other:    Potassium normal 4.4    Assessment / Plan       Active Hospital Problems:  Active Hospital Problems    Diagnosis     Pressure ulcer, stage 2     Acute UTI (urinary tract infection)     Periodic paralysis     Hypokalemia        Plan:   Potassium normalized, .  Currently on Aldactone  Continues to complain of weakness and fatigue.  Unable to walk.  Prefers to go to nursing home or rehab.  Patient also has skin breakdown.  Continue wound care.  Evaluate for inpatient rehab    DVT prophylaxis:  Medical DVT prophylaxis orders are present.    CODE STATUS:   Code Status (Patient has no pulse and is not breathing): CPR (Attempt to Resuscitate)  Medical  Interventions (Patient has pulse or is breathing): Full Support              Electronically signed by Luiz Diana MD, 01/12/24, 6:18 PM EST.

## 2024-01-12 NOTE — PLAN OF CARE
Goal Outcome Evaluation:  Plan of Care Reviewed With: patient        Progress: no change

## 2024-01-13 LAB
ALBUMIN SERPL-MCNC: 2.3 G/DL (ref 3.5–5.2)
ALBUMIN/GLOB SERPL: 1 G/DL
ALP SERPL-CCNC: 143 U/L (ref 39–117)
ALT SERPL W P-5'-P-CCNC: 28 U/L (ref 1–33)
ANION GAP SERPL CALCULATED.3IONS-SCNC: 5.9 MMOL/L (ref 5–15)
AST SERPL-CCNC: 26 U/L (ref 1–32)
BACTERIA SPEC AEROBE CULT: NORMAL
BACTERIA SPEC AEROBE CULT: NORMAL
BASOPHILS # BLD AUTO: 0.07 10*3/MM3 (ref 0–0.2)
BASOPHILS NFR BLD AUTO: 1 % (ref 0–1.5)
BILIRUB SERPL-MCNC: 0.4 MG/DL (ref 0–1.2)
BUN SERPL-MCNC: 8 MG/DL (ref 8–23)
BUN/CREAT SERPL: 14.8 (ref 7–25)
CALCIUM SPEC-SCNC: 8.3 MG/DL (ref 8.6–10.5)
CHLORIDE SERPL-SCNC: 102 MMOL/L (ref 98–107)
CO2 SERPL-SCNC: 26.1 MMOL/L (ref 22–29)
CREAT SERPL-MCNC: 0.54 MG/DL (ref 0.57–1)
DEPRECATED RDW RBC AUTO: 75.7 FL (ref 37–54)
EGFRCR SERPLBLD CKD-EPI 2021: 97.4 ML/MIN/1.73
EOSINOPHIL # BLD AUTO: 0.35 10*3/MM3 (ref 0–0.4)
EOSINOPHIL NFR BLD AUTO: 5 % (ref 0.3–6.2)
ERYTHROCYTE [DISTWIDTH] IN BLOOD BY AUTOMATED COUNT: 23 % (ref 12.3–15.4)
GLOBULIN UR ELPH-MCNC: 2.3 GM/DL
GLUCOSE BLDC GLUCOMTR-MCNC: 89 MG/DL (ref 70–99)
GLUCOSE SERPL-MCNC: 109 MG/DL (ref 65–99)
HAV AB SER QL IA: POSITIVE
HAV IGM SERPL QL IA: NEGATIVE
HBV CORE AB SERPL QL IA: NEGATIVE
HBV CORE IGM SERPL QL IA: NEGATIVE
HBV SURFACE AB SER QL: NON REACTIVE
HBV SURFACE AG SERPL QL IA: NEGATIVE
HCT VFR BLD AUTO: 27.6 % (ref 34–46.6)
HGB BLD-MCNC: 9.2 G/DL (ref 12–15.9)
IMM GRANULOCYTES # BLD AUTO: 0.1 10*3/MM3 (ref 0–0.05)
IMM GRANULOCYTES NFR BLD AUTO: 1.4 % (ref 0–0.5)
LYMPHOCYTES # BLD AUTO: 1.56 10*3/MM3 (ref 0.7–3.1)
LYMPHOCYTES NFR BLD AUTO: 22.4 % (ref 19.6–45.3)
MCH RBC QN AUTO: 31 PG (ref 26.6–33)
MCHC RBC AUTO-ENTMCNC: 33.3 G/DL (ref 31.5–35.7)
MCV RBC AUTO: 92.9 FL (ref 79–97)
MONOCYTES # BLD AUTO: 0.98 10*3/MM3 (ref 0.1–0.9)
MONOCYTES NFR BLD AUTO: 14.1 % (ref 5–12)
NEUTROPHILS NFR BLD AUTO: 3.89 10*3/MM3 (ref 1.7–7)
NEUTROPHILS NFR BLD AUTO: 56.1 % (ref 42.7–76)
NRBC BLD AUTO-RTO: 0 /100 WBC (ref 0–0.2)
PLATELET # BLD AUTO: 290 10*3/MM3 (ref 140–450)
PMV BLD AUTO: 9.1 FL (ref 6–12)
POTASSIUM SERPL-SCNC: 4.3 MMOL/L (ref 3.5–5.2)
PROT SERPL-MCNC: 4.6 G/DL (ref 6–8.5)
RBC # BLD AUTO: 2.97 10*6/MM3 (ref 3.77–5.28)
SODIUM SERPL-SCNC: 134 MMOL/L (ref 136–145)
WBC NRBC COR # BLD AUTO: 6.95 10*3/MM3 (ref 3.4–10.8)

## 2024-01-13 PROCEDURE — 82948 REAGENT STRIP/BLOOD GLUCOSE: CPT

## 2024-01-13 PROCEDURE — 25010000002 CEFTRIAXONE PER 250 MG: Performed by: INTERNAL MEDICINE

## 2024-01-13 PROCEDURE — 80053 COMPREHEN METABOLIC PANEL: CPT | Performed by: INTERNAL MEDICINE

## 2024-01-13 PROCEDURE — 25010000002 ENOXAPARIN PER 10 MG: Performed by: INTERNAL MEDICINE

## 2024-01-13 PROCEDURE — 85025 COMPLETE CBC W/AUTO DIFF WBC: CPT | Performed by: INTERNAL MEDICINE

## 2024-01-13 RX ADMIN — AMITRIPTYLINE HYDROCHLORIDE 50 MG: 50 TABLET, FILM COATED ORAL at 20:32

## 2024-01-13 RX ADMIN — DOCUSATE SODIUM 50MG AND SENNOSIDES 8.6MG 2 TABLET: 8.6; 5 TABLET, FILM COATED ORAL at 20:32

## 2024-01-13 RX ADMIN — Medication 10 ML: at 08:27

## 2024-01-13 RX ADMIN — KETOROLAC TROMETHAMINE 10 MG: 10 TABLET, FILM COATED ORAL at 21:54

## 2024-01-13 RX ADMIN — ENOXAPARIN SODIUM 40 MG: 100 INJECTION SUBCUTANEOUS at 08:27

## 2024-01-13 RX ADMIN — METHOCARBAMOL 500 MG: 500 TABLET ORAL at 08:27

## 2024-01-13 RX ADMIN — HYDROCODONE BITARTRATE AND ACETAMINOPHEN 1 TABLET: 5; 325 TABLET ORAL at 08:27

## 2024-01-13 RX ADMIN — HYDROCODONE BITARTRATE AND ACETAMINOPHEN 1 TABLET: 5; 325 TABLET ORAL at 03:03

## 2024-01-13 RX ADMIN — HYDROCODONE BITARTRATE AND ACETAMINOPHEN 1 TABLET: 5; 325 TABLET ORAL at 19:01

## 2024-01-13 RX ADMIN — METHOCARBAMOL 500 MG: 500 TABLET ORAL at 20:32

## 2024-01-13 RX ADMIN — Medication 10 ML: at 20:32

## 2024-01-13 RX ADMIN — ATORVASTATIN CALCIUM 20 MG: 20 TABLET, FILM COATED ORAL at 20:32

## 2024-01-13 RX ADMIN — CEFTRIAXONE SODIUM 2000 MG: 2 INJECTION, POWDER, FOR SOLUTION INTRAMUSCULAR; INTRAVENOUS at 10:30

## 2024-01-13 RX ADMIN — KETOROLAC TROMETHAMINE 10 MG: 10 TABLET, FILM COATED ORAL at 05:41

## 2024-01-13 RX ADMIN — MONTELUKAST 10 MG: 10 TABLET, FILM COATED ORAL at 20:32

## 2024-01-13 RX ADMIN — SPIRONOLACTONE 25 MG: 25 TABLET ORAL at 08:27

## 2024-01-13 RX ADMIN — FAMOTIDINE 20 MG: 20 TABLET ORAL at 08:27

## 2024-01-13 RX ADMIN — LEVOTHYROXINE SODIUM 75 MCG: 75 TABLET ORAL at 05:41

## 2024-01-13 NOTE — PLAN OF CARE
Goal Outcome Evaluation:      No acute events this shift.  VSS.

## 2024-01-13 NOTE — PLAN OF CARE
Goal Outcome Evaluation:           Progress: no change  Outcome Evaluation: VSS. Medicated per MAR for pain. No acute changes.

## 2024-01-13 NOTE — PROGRESS NOTES
[facility]     Progress Note    Patient Name: Lona Rodríguez  : 1950  MRN: [id number]  Primary Care Physician:  Christopher Thao MD  Date of admission: 2024      Subjective   Brief summary.  Patient admitted with severe hypokalemia and paralysis      HPI:  No new complaints or issues sleepy.  Continues to complains of aches and pain, inability to walk.  Muscle cramps etc..    Review of Systems     No fever chills, no chest pain, no palpitation      Objective     Vitals:   Temp:  [97.3 °F (36.3 °C)-98.6 °F (37 °C)] 98.1 °F (36.7 °C)  Heart Rate:  [] 98  Resp:  [15-19] 15  BP: ()/(54-76) 86/62    Physical Exam :     Elderly female not in acute distress.    Awake alert and oriented.    Heart regular.  Lungs clear.  Abdomen soft obese.  Nontender.  Neuro awake alert and oriented.  Extremities trace of edema      Result Review:  I have personally reviewed the results from the time of this admission to 2024 12:37 EST and agree with these findings:  []  Laboratory  []  Microbiology  []  Radiology  []  EKG/Telemetry   []  Cardiology/Vascular   []  Pathology  []  Old records  []  Other:        Assessment / Plan       Active Hospital Problems:  Active Hospital Problems    Diagnosis     Pressure ulcer, stage 2     Acute UTI (urinary tract infection)     Periodic paralysis     Hypokalemia        Plan:   Stable and improving.  Recheck labs in AM.  PT OT.  Waiting for placement, pre-CERT pending.    DVT prophylaxis:  Medical DVT prophylaxis orders are present.    CODE STATUS:   Code Status (Patient has no pulse and is not breathing): CPR (Attempt to Resuscitate)  Medical Interventions (Patient has pulse or is breathing): Full Support                Electronically signed by Luiz Diana MD, 24, 12:38 PM EST.

## 2024-01-14 LAB
ALBUMIN SERPL-MCNC: 2 G/DL (ref 3.5–5.2)
ALBUMIN/GLOB SERPL: 1 G/DL
ALP SERPL-CCNC: 111 U/L (ref 39–117)
ALT SERPL W P-5'-P-CCNC: 21 U/L (ref 1–33)
ANION GAP SERPL CALCULATED.3IONS-SCNC: 7.5 MMOL/L (ref 5–15)
AST SERPL-CCNC: 20 U/L (ref 1–32)
BASOPHILS # BLD AUTO: 0.06 10*3/MM3 (ref 0–0.2)
BASOPHILS NFR BLD AUTO: 1 % (ref 0–1.5)
BILIRUB SERPL-MCNC: 0.3 MG/DL (ref 0–1.2)
BUN SERPL-MCNC: 7 MG/DL (ref 8–23)
BUN/CREAT SERPL: 13 (ref 7–25)
CALCIUM SPEC-SCNC: 8.1 MG/DL (ref 8.6–10.5)
CHLORIDE SERPL-SCNC: 106 MMOL/L (ref 98–107)
CO2 SERPL-SCNC: 23.5 MMOL/L (ref 22–29)
CREAT SERPL-MCNC: 0.54 MG/DL (ref 0.57–1)
DEPRECATED RDW RBC AUTO: 82 FL (ref 37–54)
EGFRCR SERPLBLD CKD-EPI 2021: 97.4 ML/MIN/1.73
EOSINOPHIL # BLD AUTO: 0.34 10*3/MM3 (ref 0–0.4)
EOSINOPHIL NFR BLD AUTO: 5.4 % (ref 0.3–6.2)
ERYTHROCYTE [DISTWIDTH] IN BLOOD BY AUTOMATED COUNT: 23.4 % (ref 12.3–15.4)
FERRITIN SERPL-MCNC: 317.4 NG/ML (ref 13–150)
FOLATE SERPL-MCNC: 4.18 NG/ML (ref 4.78–24.2)
GLOBULIN UR ELPH-MCNC: 2.1 GM/DL
GLUCOSE SERPL-MCNC: 92 MG/DL (ref 65–99)
HCT VFR BLD AUTO: 27.4 % (ref 34–46.6)
HGB BLD-MCNC: 8.6 G/DL (ref 12–15.9)
IMM GRANULOCYTES # BLD AUTO: 0.08 10*3/MM3 (ref 0–0.05)
IMM GRANULOCYTES NFR BLD AUTO: 1.3 % (ref 0–0.5)
IRON 24H UR-MRATE: 54 MCG/DL (ref 37–145)
IRON SATN MFR SERPL: 36 % (ref 20–50)
LDH SERPL-CCNC: 272 U/L (ref 135–214)
LYMPHOCYTES # BLD AUTO: 1.28 10*3/MM3 (ref 0.7–3.1)
LYMPHOCYTES NFR BLD AUTO: 20.4 % (ref 19.6–45.3)
MCH RBC QN AUTO: 30.8 PG (ref 26.6–33)
MCHC RBC AUTO-ENTMCNC: 31.4 G/DL (ref 31.5–35.7)
MCV RBC AUTO: 98.2 FL (ref 79–97)
MONOCYTES # BLD AUTO: 0.89 10*3/MM3 (ref 0.1–0.9)
MONOCYTES NFR BLD AUTO: 14.2 % (ref 5–12)
NEUTROPHILS NFR BLD AUTO: 3.62 10*3/MM3 (ref 1.7–7)
NEUTROPHILS NFR BLD AUTO: 57.7 % (ref 42.7–76)
NRBC BLD AUTO-RTO: 0 /100 WBC (ref 0–0.2)
PLATELET # BLD AUTO: 248 10*3/MM3 (ref 140–450)
PMV BLD AUTO: 8.9 FL (ref 6–12)
POTASSIUM SERPL-SCNC: 4.3 MMOL/L (ref 3.5–5.2)
PROT SERPL-MCNC: 4.1 G/DL (ref 6–8.5)
RBC # BLD AUTO: 2.79 10*6/MM3 (ref 3.77–5.28)
RETICS # AUTO: 0.05 10*6/MM3 (ref 0.02–0.13)
RETICS/RBC NFR AUTO: 1.81 % (ref 0.7–1.9)
SODIUM SERPL-SCNC: 137 MMOL/L (ref 136–145)
TIBC SERPL-MCNC: 152 MCG/DL (ref 298–536)
TRANSFERRIN SERPL-MCNC: 102 MG/DL (ref 200–360)
VIT B12 BLD-MCNC: 985 PG/ML (ref 211–946)
WBC NRBC COR # BLD AUTO: 6.27 10*3/MM3 (ref 3.4–10.8)

## 2024-01-14 PROCEDURE — 82607 VITAMIN B-12: CPT | Performed by: INTERNAL MEDICINE

## 2024-01-14 PROCEDURE — 25010000002 ENOXAPARIN PER 10 MG: Performed by: INTERNAL MEDICINE

## 2024-01-14 PROCEDURE — 80053 COMPREHEN METABOLIC PANEL: CPT | Performed by: INTERNAL MEDICINE

## 2024-01-14 PROCEDURE — 85045 AUTOMATED RETICULOCYTE COUNT: CPT | Performed by: INTERNAL MEDICINE

## 2024-01-14 PROCEDURE — 83615 LACTATE (LD) (LDH) ENZYME: CPT | Performed by: INTERNAL MEDICINE

## 2024-01-14 PROCEDURE — 84466 ASSAY OF TRANSFERRIN: CPT | Performed by: INTERNAL MEDICINE

## 2024-01-14 PROCEDURE — 82728 ASSAY OF FERRITIN: CPT | Performed by: INTERNAL MEDICINE

## 2024-01-14 PROCEDURE — 85025 COMPLETE CBC W/AUTO DIFF WBC: CPT | Performed by: INTERNAL MEDICINE

## 2024-01-14 PROCEDURE — 82746 ASSAY OF FOLIC ACID SERUM: CPT | Performed by: INTERNAL MEDICINE

## 2024-01-14 PROCEDURE — 83540 ASSAY OF IRON: CPT | Performed by: INTERNAL MEDICINE

## 2024-01-14 PROCEDURE — 25010000002 CEFTRIAXONE PER 250 MG: Performed by: INTERNAL MEDICINE

## 2024-01-14 RX ORDER — FERROUS SULFATE 325(65) MG
325 TABLET ORAL 2 TIMES DAILY WITH MEALS
Status: DISCONTINUED | OUTPATIENT
Start: 2024-01-14 | End: 2024-01-19 | Stop reason: HOSPADM

## 2024-01-14 RX ADMIN — LEVOTHYROXINE SODIUM 75 MCG: 75 TABLET ORAL at 06:09

## 2024-01-14 RX ADMIN — DOCUSATE SODIUM 50MG AND SENNOSIDES 8.6MG 2 TABLET: 8.6; 5 TABLET, FILM COATED ORAL at 21:06

## 2024-01-14 RX ADMIN — ATORVASTATIN CALCIUM 20 MG: 20 TABLET, FILM COATED ORAL at 20:51

## 2024-01-14 RX ADMIN — HYDROCODONE BITARTRATE AND ACETAMINOPHEN 1 TABLET: 5; 325 TABLET ORAL at 10:44

## 2024-01-14 RX ADMIN — FERROUS SULFATE TAB 325 MG (65 MG ELEMENTAL FE) 325 MG: 325 (65 FE) TAB at 17:23

## 2024-01-14 RX ADMIN — FERROUS SULFATE TAB 325 MG (65 MG ELEMENTAL FE) 325 MG: 325 (65 FE) TAB at 10:44

## 2024-01-14 RX ADMIN — HYDROCODONE BITARTRATE AND ACETAMINOPHEN 1 TABLET: 5; 325 TABLET ORAL at 02:00

## 2024-01-14 RX ADMIN — Medication 10 ML: at 20:51

## 2024-01-14 RX ADMIN — FAMOTIDINE 20 MG: 20 TABLET ORAL at 08:53

## 2024-01-14 RX ADMIN — SPIRONOLACTONE 25 MG: 25 TABLET ORAL at 08:53

## 2024-01-14 RX ADMIN — Medication 10 ML: at 08:53

## 2024-01-14 RX ADMIN — METHOCARBAMOL 500 MG: 500 TABLET ORAL at 08:53

## 2024-01-14 RX ADMIN — AMITRIPTYLINE HYDROCHLORIDE 50 MG: 50 TABLET, FILM COATED ORAL at 20:50

## 2024-01-14 RX ADMIN — MONTELUKAST 10 MG: 10 TABLET, FILM COATED ORAL at 20:50

## 2024-01-14 RX ADMIN — HYDROCODONE BITARTRATE AND ACETAMINOPHEN 1 TABLET: 5; 325 TABLET ORAL at 17:50

## 2024-01-14 RX ADMIN — POLYETHYLENE GLYCOL 3350 17 G: 17 POWDER, FOR SOLUTION ORAL at 20:50

## 2024-01-14 RX ADMIN — ENOXAPARIN SODIUM 40 MG: 100 INJECTION SUBCUTANEOUS at 08:53

## 2024-01-14 RX ADMIN — METHOCARBAMOL 500 MG: 500 TABLET ORAL at 20:51

## 2024-01-14 RX ADMIN — HYDROCODONE BITARTRATE AND ACETAMINOPHEN 1 TABLET: 5; 325 TABLET ORAL at 06:09

## 2024-01-14 RX ADMIN — CEFTRIAXONE SODIUM 2000 MG: 2 INJECTION, POWDER, FOR SOLUTION INTRAMUSCULAR; INTRAVENOUS at 10:44

## 2024-01-14 NOTE — PLAN OF CARE
Goal Outcome Evaluation:   No acute events this shift. VSS.

## 2024-01-14 NOTE — PROGRESS NOTES
[facility]     Progress Note    Patient Name: Lona Rodríguez  : 1950  MRN: [id number]  Primary Care Physician:  Christopher Thao MD  Date of admission: 2024      Subjective   Brief summary.  Patient admitted with severe hypokalemia and subsequently had muscle aches and pain, wants to go to rehab      HPI:  Doing better, still aches and pains, requesting Toradol.  Patient is having low hemoglobin now.  No active bleed noted.      Review of Systems     No fever chills, no chest pain, no palpitation  \No blood in the stools or black-colored stools  Chronic leg pains and cramps      Objective     Vitals:   Temp:  [97.7 °F (36.5 °C)-98.2 °F (36.8 °C)] 97.7 °F (36.5 °C)  Heart Rate:  [] 88  Resp:  [16-18] 16  BP: ()/(56-68) 102/66    Physical Exam :     Elderly female not in acute distress.    Awake alert and oriented.    Heart regular.  Lungs clear.  Abdomen soft obese.  Nontender.  Neuro awake alert and oriented.  Extremities trace of edema      Result Review:  I have personally reviewed the results from the time of this admission to 2024 12:30 EST and agree with these findings:  []  Laboratory  []  Microbiology  []  Radiology  []  EKG/Telemetry   []  Cardiology/Vascular   []  Pathology  []  Old records  []  Other:      Hemoglobin 8 point  Assessment / Plan       Active Hospital Problems:  Active Hospital Problems    Diagnosis     Pressure ulcer, stage 2     Acute UTI (urinary tract infection)     Periodic paralysis     Hypokalemia        Plan:   Overall doing well.  Potassium stabilized.  Hemoglobin drifting down.  Will proceed with iron profile and B12 levels start oral iron supplements, avoid NSAIDs.  Waiting for placement.    DVT prophylaxis:  Medical DVT prophylaxis orders are present.    CODE STATUS:   Code Status (Patient has no pulse and is not breathing): CPR (Attempt to Resuscitate)  Medical Interventions (Patient has pulse or is breathing): Full  Support                Electronically signed by Luiz Diana MD, 01/14/24, 12:31 PM EST.

## 2024-01-15 PROCEDURE — 97535 SELF CARE MNGMENT TRAINING: CPT

## 2024-01-15 PROCEDURE — 97530 THERAPEUTIC ACTIVITIES: CPT

## 2024-01-15 PROCEDURE — 97110 THERAPEUTIC EXERCISES: CPT

## 2024-01-15 PROCEDURE — 97116 GAIT TRAINING THERAPY: CPT

## 2024-01-15 PROCEDURE — 25010000002 ENOXAPARIN PER 10 MG: Performed by: INTERNAL MEDICINE

## 2024-01-15 PROCEDURE — 25010000002 CEFTRIAXONE PER 250 MG: Performed by: INTERNAL MEDICINE

## 2024-01-15 RX ORDER — FOLIC ACID 1 MG/1
1 TABLET ORAL DAILY
Status: DISCONTINUED | OUTPATIENT
Start: 2024-01-15 | End: 2024-01-19 | Stop reason: HOSPADM

## 2024-01-15 RX ADMIN — ATORVASTATIN CALCIUM 20 MG: 20 TABLET, FILM COATED ORAL at 22:20

## 2024-01-15 RX ADMIN — DOCUSATE SODIUM 50MG AND SENNOSIDES 8.6MG 2 TABLET: 8.6; 5 TABLET, FILM COATED ORAL at 22:21

## 2024-01-15 RX ADMIN — Medication 10 ML: at 09:02

## 2024-01-15 RX ADMIN — SPIRONOLACTONE 25 MG: 25 TABLET ORAL at 09:01

## 2024-01-15 RX ADMIN — FERROUS SULFATE TAB 325 MG (65 MG ELEMENTAL FE) 325 MG: 325 (65 FE) TAB at 09:01

## 2024-01-15 RX ADMIN — FERROUS SULFATE TAB 325 MG (65 MG ELEMENTAL FE) 325 MG: 325 (65 FE) TAB at 18:17

## 2024-01-15 RX ADMIN — Medication 1 MG: at 10:21

## 2024-01-15 RX ADMIN — CEFTRIAXONE SODIUM 2000 MG: 2 INJECTION, POWDER, FOR SOLUTION INTRAMUSCULAR; INTRAVENOUS at 10:21

## 2024-01-15 RX ADMIN — METHOCARBAMOL 500 MG: 500 TABLET ORAL at 22:37

## 2024-01-15 RX ADMIN — METHOCARBAMOL 500 MG: 500 TABLET ORAL at 09:01

## 2024-01-15 RX ADMIN — HYDROCODONE BITARTRATE AND ACETAMINOPHEN 1 TABLET: 5; 325 TABLET ORAL at 22:21

## 2024-01-15 RX ADMIN — AMITRIPTYLINE HYDROCHLORIDE 50 MG: 50 TABLET, FILM COATED ORAL at 22:37

## 2024-01-15 RX ADMIN — HYDROCODONE BITARTRATE AND ACETAMINOPHEN 1 TABLET: 5; 325 TABLET ORAL at 16:03

## 2024-01-15 RX ADMIN — MONTELUKAST 10 MG: 10 TABLET, FILM COATED ORAL at 22:21

## 2024-01-15 RX ADMIN — FAMOTIDINE 20 MG: 20 TABLET ORAL at 09:01

## 2024-01-15 RX ADMIN — Medication 10 ML: at 22:39

## 2024-01-15 RX ADMIN — LEVOTHYROXINE SODIUM 75 MCG: 75 TABLET ORAL at 05:51

## 2024-01-15 RX ADMIN — HYDROCODONE BITARTRATE AND ACETAMINOPHEN 1 TABLET: 5; 325 TABLET ORAL at 02:04

## 2024-01-15 RX ADMIN — ENOXAPARIN SODIUM 40 MG: 100 INJECTION SUBCUTANEOUS at 09:01

## 2024-01-15 NOTE — PROGRESS NOTES
[facility]     Progress Note    Patient Name: Lona Rodríguez  : 1950  MRN: [id number]  Primary Care Physician:  Christopher Thao MD  Date of admission: 2024      Subjective   Brief summary.  Patient admitted with severe hypokalemia and subsequently had muscle aches and pain, wants to go to rehab      HPI:  Patient feeling better, continues to have issues with weakness, acute rehab pre-CERT still pending.  Patient says now she cannot go home and she wants to go to inpatient rehab or nursing home.      Review of Systems     Chronic aches and pains, no chest pain no shortness of breath      Objective     Vitals:   Temp:  [97.3 °F (36.3 °C)-98.4 °F (36.9 °C)] 98.4 °F (36.9 °C)  Heart Rate:  [] 98  Resp:  [16-20] 20  BP: ()/(57-92) 103/63    Physical Exam :     Elderly female not in acute distress.    Awake alert and oriented.    Heart regular.  Lungs clear.  Abdomen soft obese.  Nontender.  Neuro awake alert and oriented.  Extremities trace of edema      Result Review:  I have personally reviewed the results from the time of this admission to 1/15/2024 17:03 EST and agree with these findings:  []  Laboratory  []  Microbiology  []  Radiology  []  EKG/Telemetry   []  Cardiology/Vascular   []  Pathology  []  Old records  []  Other:    Folic acid 4.1 somewhat low    Assessment / Plan       Active Hospital Problems:  Active Hospital Problems    Diagnosis     Pressure ulcer, stage 2     Acute UTI (urinary tract infection)     Periodic paralysis     Hypokalemia        Plan:   Stable start folic acid supplement.  Patient now does not want to go back to home unless she gets into rehab, acute rehab pre-CERT pending.  Discussed with , check labs in a.m.    DVT prophylaxis:  Medical DVT prophylaxis orders are present.    CODE STATUS:   Code Status (Patient has no pulse and is not breathing): CPR (Attempt to Resuscitate)  Medical Interventions (Patient has pulse or is breathing): Full  Support                Electronically signed by Luiz Diana MD, 01/15/24, 5:06 PM EST.

## 2024-01-15 NOTE — THERAPY TREATMENT NOTE
Acute Care - Physical Therapy Treatment Note  CATALINA Rodriguez     Patient Name: Lona Rodríguez  : 1950  MRN: [id number]  Today's Date: 1/15/2024      Visit Dx:     ICD-10-CM ICD-9-CM   1. Acute hypokalemia  E87.6 276.8   2. Hypokalemic periodic paralysis  G72.3 359.3   3. Elevated LFTs  R79.89 790.6   4. Acute UTI (urinary tract infection)  N39.0 599.0   5. Decreased activities of daily living (ADL)  Z78.9 V49.89   6. Difficulty in walking  R26.2 719.7     Patient Active Problem List   Diagnosis    Intractable pain    Wrist fracture    Rib fractures    Lung nodule    Dyspnea    Closed fracture of one rib, unspecified laterality, initial encounter    Breast asymmetry in female    Breast skin changes    GERD (gastroesophageal reflux disease)    Chronic nausea    Pedal edema    Esophagitis determined by endoscopy    Hypokalemia    Periodic paralysis    Acute UTI (urinary tract infection)    Pressure ulcer, stage 2     Past Medical History:   Diagnosis Date    AMD (age related macular degeneration)     Anemia Following surgery    Arthritis     Asthma     Breast cancer Left breast.    Breast mass 2 lumpectomies 1 cancerous 1 benign    CHF (congestive heart failure) Just getting evaluation    Colon polyp Removed during colonos    COPD (chronic obstructive pulmonary disease)     Coronary artery disease Chf    Deep vein thrombosis 1979    Fibrocystic breast Left breast    Fibromyalgia     History of transfusion Aug 2019 2 units    Hyperlipidemia     Hypertension     Nausea and vomiting 2023    Obesity     Polymyalgia arteritica     Postoperative wound infection Lymph node resection site,5th toe left foot infection after neuroma surgery. 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    Sciatica      Past Surgical History:   Procedure Laterality Date    BREAST BIOPSY  Left    BREAST SURGERY  Lumpectomies left breast    ENDOSCOPY N/A 2023    Procedure: ESOPHAGOGASTRODUODENOSCOPY WITH BIOPSIES;  Surgeon: Maximino Aceves MD;  Location:  [facility] END[facility];  Service: Gastroenterology;  Laterality: N/A;  ESOPHAGITIS, GASTRITIS, HIATAL HERNIA    EYE SURGERY  Lens implants both eyes    REPLACEMENT TOTAL KNEE      TONSILLECTOMY       PT Assessment (last 12 hours)       PT Evaluation and Treatment       Row Name 01/15/24 1058          Physical Therapy Time and Intention    Subjective Information complains of;weakness;dyspnea;numbness  -VK     Document Type therapy note (daily note)  -VK     Mode of Treatment individual therapy;physical therapy  -VK     Patient Effort good  -VK     Symptoms Noted During/After Treatment other (see comments)  Pt reports her LLE is numb and has been since admission.  -VK       Row Name 01/15/24 1058          General Information    Patient Profile Reviewed yes  -VK       Row Name 01/15/24 1058          Cognition    Affect/Mental Status (Cognition) WFL;anxious  -VK     Behavioral Issues (Cognition) overwhelmed easily  -VK     Orientation Status (Cognition) oriented to;person;place;situation  -VK     Personal Safety Interventions fall prevention program maintained;gait belt;nonskid shoes/slippers when out of bed  -VK       Row Name 01/15/24 1058          Bed Mobility    Supine-Sit [place] (Bed Mobility) moderate assist (50% patient effort);1 person assist  -VK     Sit-Supine [place] (Bed Mobility) moderate assist (50% patient effort);1 person assist  -VK     Bed Mobility, Safety Issues decreased use of arms for pushing/pulling;decreased use of legs for bridging/pushing  -VK     Assistive Device (Bed Mobility) bed rails;head of bed elevated;draw sheet  -VK       Row Name 01/15/24 1058          Transfers    Transfers sit-stand transfer;stand-sit transfer;toilet transfer  -VK       Row Name 01/15/24 1058          Sit-Stand Transfer    Sit-Stand [place] (Transfers) minimum assist (75% patient effort);moderate assist (50% patient effort);verbal cues;1 person assist  -VK     Assistive Device (Sit-Stand Transfers) walker,  front-wheeled  -VK       Row Name 01/15/24 1058          Stand-Sit Transfer    Stand-Sit [place] (Transfers) minimum assist (75% patient effort);moderate assist (50% patient effort);verbal cues;1 person assist  -VK     Assistive Device (Stand-Sit Transfers) walker, front-wheeled  -VK       Row Name 01/15/24 1058          Toilet Transfer    Type (Toilet Transfer) sit-stand;stand-sit  -VK     [place] Level (Toilet Transfer) minimum assist (75% patient effort);moderate assist (50% patient effort);verbal cues;1 person assist  -VK     Assistive Device (Toilet Transfer) commode, bedside without drop arms;walker, front-wheeled  -VK       Row Name 01/15/24 1058          Gait/Stairs (Locomotion)    Gait/Stairs Locomotion gait/ambulation assistive device  -VK     [place] Level (Gait) minimum assist (75% patient effort);moderate assist (50% patient effort);1 person assist  -VK     Assistive Device (Gait) walker, front-wheeled  -VK     Patient was able to Ambulate yes  -VK     Distance in Feet (Gait) 4x2  -VK     Pattern (Gait) step-to  -VK     Deviations/Abnormal Patterns (Gait) gait speed decreased;stride length decreased;base of support, narrow  -VK     Bilateral Gait Deviations weight shift ability decreased;heel strike decreased;forward flexed posture  -VK     Gait Assessment/Intervention Pt becomes very fearful of falling with ambulation.  -VK       Row Name 01/15/24 1058          Safety Issues, Functional Mobility    Impairments Affecting Function (Mobility) balance;strength;endurance/activity tolerance;pain  -VK       Row Name 01/15/24 1058          Balance    Balance Assessment sitting static balance  -VK     Static Sitting Balance supervision;contact guard  -VK     Position, Sitting Balance sitting edge of bed  -VK       Row Name             Wound 01/09/24 0120 Left gluteal    Wound - Properties Group Placement Date: 01/09/24  -LK Placement Time: 0120  -LK Present on Original Admission: Y  -LK Side:  Left  -LK Location: gluteal  -LK    Retired Wound - Properties Group Placement Date: 01/09/24  -LK Placement Time: 0120  -LK Present on Original Admission: Y  -LK Side: Left  -LK Location: gluteal  -LK    Retired Wound - Properties Group Date first assessed: 01/09/24  -LK Time first assessed: 0120  -LK Present on Original Admission: Y  -LK Side: Left  -LK Location: gluteal  -LK      Row Name             Wound 01/09/24 1008 Left anterior hip Pressure Injury    Wound - Properties Group Placement Date: 01/09/24  -NH Placement Time: 1008  -NH Side: Left  -NH Orientation: anterior  -NH Location: hip  -NH Primary Wound Type: Pressure inj  -NH    Retired Wound - Properties Group Placement Date: 01/09/24  -NH Placement Time: 1008  -NH Side: Left  -NH Orientation: anterior  -NH Location: hip  -NH Primary Wound Type: Pressure inj  -NH    Retired Wound - Properties Group Date first assessed: 01/09/24  -NH Time first assessed: 1008  -NH Side: Left  -NH Location: hip  -NH Primary Wound Type: Pressure inj  -NH      Row Name             Wound 01/09/24 1008 Right anterior hip Pressure Injury    Wound - Properties Group Placement Date: 01/09/24  -NH Placement Time: 1008  -NH Side: Right  -NH Orientation: anterior  -NH Location: hip  -NH Primary Wound Type: Pressure inj  -NH    Retired Wound - Properties Group Placement Date: 01/09/24  -NH Placement Time: 1008  -NH Side: Right  -NH Orientation: anterior  -NH Location: hip  -NH Primary Wound Type: Pressure inj  -NH    Retired Wound - Properties Group Date first assessed: 01/09/24  -NH Time first assessed: 1008  -NH Side: Right  -NH Location: hip  -NH Primary Wound Type: Pressure inj  -NH      Row Name             Wound 01/09/24 1008 Left anterior foot Pressure Injury    Wound - Properties Group Placement Date: 01/09/24  -NH Placement Time: 1008  -NH Side: Left  -NH Orientation: anterior  -NH Location: foot  -NH Primary Wound Type: Pressure inj  -NH    Retired Wound - Properties Group  Placement Date: 01/09/24  -NH Placement Time: 1008  -NH Side: Left  -NH Orientation: anterior  -NH Location: foot  -NH Primary Wound Type: Pressure inj  -NH    Retired Wound - Properties Group Date first assessed: 01/09/24  -NH Time first assessed: 1008  -NH Side: Left  -NH Location: foot  -NH Primary Wound Type: Pressure inj  -NH      Row Name             Wound 01/09/24 1008 Right gluteal Pressure Injury    Wound - Properties Group Placement Date: 01/09/24  -NH Placement Time: 1008  -NH Side: Right  -NH Location: gluteal  -NH Primary Wound Type: Pressure inj  -NH    Retired Wound - Properties Group Placement Date: 01/09/24  -NH Placement Time: 1008  -NH Side: Right  -NH Location: gluteal  -NH Primary Wound Type: Pressure inj  -NH    Retired Wound - Properties Group Date first assessed: 01/09/24  -NH Time first assessed: 1008  -NH Side: Right  -NH Location: gluteal  -NH Primary Wound Type: Pressure inj  -NH      Row Name             Wound 01/09/24 1008 Right posterior thigh Pressure Injury    Wound - Properties Group Placement Date: 01/09/24  -NH Placement Time: 1008  -NH Side: Right  -NH Orientation: posterior  -NH Location: thigh  -NH Primary Wound Type: Pressure inj  -NH    Retired Wound - Properties Group Placement Date: 01/09/24  -NH Placement Time: 1008  -NH Side: Right  -NH Orientation: posterior  -NH Location: thigh  -NH Primary Wound Type: Pressure inj  -NH    Retired Wound - Properties Group Date first assessed: 01/09/24  -NH Time first assessed: 1008  -NH Side: Right  -NH Location: thigh  -NH Primary Wound Type: Pressure inj  -NH      Row Name 01/15/24 1058          Positioning and Restraints    Pre-Treatment Position in bed  -VK     Post Treatment Position chair  -VK     In Chair reclined;call light within reach;encouraged to call for assist;exit alarm on;notified ns  -VK       Row Name 01/15/24 1054          Progress Summary (PT)    Progress Toward Functional Goals (PT) progress toward functional goals  is fair  -VK     Daily Progress Summary (PT) Pt agreeable to treatment this date. Pt became very fearful of falling during transfers/ambulation, at times believing she was falling when she was not, pt was reassured she was safe. Pt requires a lot of encouragement to complete transfers. Pt cont to benefit from skilled PT to address stated deficits.  -VK               User Key  (r) = Recorded By, (t) = Taken By, (c) = Cosigned By      Initials Name Provider Type    Kylah Long, RN Registered Nurse    Erica Patel RN Registered Nurse    Ml Nobles PTA Physical Therapist Assistant                      PT Recommendation and Plan     Progress Summary (PT)  Progress Toward Functional Goals (PT): progress toward functional goals is fair  Daily Progress Summary (PT): Pt agreeable to treatment this date. Pt became very fearful of falling during transfers/ambulation, at times believing she was falling when she was not, pt was reassured she was safe. Pt requires a lot of encouragement to complete transfers. Pt cont to benefit from skilled PT to address stated deficits.   Outcome Measures       Row Name 01/15/24 1300             How much help from another person do you currently need...    Turning from your back to your side while in flat bed without using bedrails? 2  -VK      Moving from lying on back to sitting on the side of a flat bed without bedrails? 2  -VK      Moving to and from a bed to a chair (including a wheelchair)? 2  -VK      Standing up from a chair using your arms (e.g., wheelchair, bedside chair)? 2  -VK      Climbing 3-5 steps with a railing? 1  -VK      To walk in hospital room? 2  -VK      AM-PAC 6 Clicks Score (PT) 11  -VK      Highest Level of Mobility Goal 4 --> Transfer to chair/commode  -VK                User Key  (r) = Recorded By, (t) = Taken By, (c) = Cosigned By      Initials Name Provider Type    Ml Nobles PTA Physical Therapist Assistant                     Time  Calculation:    PT Charges       Row Name 01/15/24 1330             Time Calculation    PT Received On 01/15/24  -VK         Timed Charges    53237 - Gait Training Minutes  20  -VK      19050 - PT Therapeutic Activity Minutes 29  -VK         Total Minutes    Timed Charges Total Minutes 49  -VK       Total Minutes 49  -VK                User Key  (r) = Recorded By, (t) = Taken By, (c) = Cosigned By      Initials Name Provider Type    Ml Nobles PTA Physical Therapist Assistant                  Therapy Charges for Today       Code Description Service Date Service Provider Modifiers Qty    78088148646 HC GAIT TRAINING EA 15 MIN 1/15/2024 Ml Borden PTA GP 1    24945198113 HC PT THERAPEUTIC ACT EA 15 MIN 1/15/2024 Ml Borden PTA GP 2            PT G-Codes  Outcome Measure Options: AM-PAC 6 Clicks Daily Activity (OT), Optimal Instrument  AM-PAC 6 Clicks Score (PT): 11  AM-PAC 6 Clicks Score (OT): 16    Ml Borden PTA  1/15/2024

## 2024-01-15 NOTE — THERAPY TREATMENT NOTE
Acute Care - Physical Therapy Treatment Note  CATALINA Rodriguez     Patient Name: Loan Rodríguez  : 1950  MRN: [id number]  Today's Date: 1/15/2024      Visit Dx:     ICD-10-CM ICD-9-CM   1. Acute hypokalemia  E87.6 276.8   2. Hypokalemic periodic paralysis  G72.3 359.3   3. Elevated LFTs  R79.89 790.6   4. Acute UTI (urinary tract infection)  N39.0 599.0   5. Decreased activities of daily living (ADL)  Z78.9 V49.89   6. Difficulty in walking  R26.2 719.7     Patient Active Problem List   Diagnosis    Intractable pain    Wrist fracture    Rib fractures    Lung nodule    Dyspnea    Closed fracture of one rib, unspecified laterality, initial encounter    Breast asymmetry in female    Breast skin changes    GERD (gastroesophageal reflux disease)    Chronic nausea    Pedal edema    Esophagitis determined by endoscopy    Hypokalemia    Periodic paralysis    Acute UTI (urinary tract infection)    Pressure ulcer, stage 2     Past Medical History:   Diagnosis Date    AMD (age related macular degeneration)     Anemia Following surgery    Arthritis     Asthma     Breast cancer Left breast.    Breast mass 2 lumpectomies 1 cancerous 1 benign    CHF (congestive heart failure) Just getting evaluation    Colon polyp Removed during colonos    COPD (chronic obstructive pulmonary disease)     Coronary artery disease Chf    Deep vein thrombosis 1979    Fibrocystic breast Left breast    Fibromyalgia     History of transfusion Aug 2019 2 units    Hyperlipidemia     Hypertension     Nausea and vomiting 2023    Obesity     Polymyalgia arteritica     Postoperative wound infection Lymph node resection site,5th toe left foot infection after neuroma surgery. 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    Sciatica      Past Surgical History:   Procedure Laterality Date    BREAST BIOPSY  Left    BREAST SURGERY  Lumpectomies left breast    ENDOSCOPY N/A 2023    Procedure: ESOPHAGOGASTRODUODENOSCOPY WITH BIOPSIES;  Surgeon: Maximino Aceves MD;  Location:  [facility] ENDOSCOPY;  Service: Gastroenterology;  Laterality: N/A;  ESOPHAGITIS, GASTRITIS, HIATAL HERNIA    EYE SURGERY  Lens implants both eyes    REPLACEMENT TOTAL KNEE      TONSILLECTOMY       PT Assessment (last 12 hours)       PT Evaluation and Treatment       Row Name 01/15/24 1430 01/15/24 1058       Physical Therapy Time and Intention    Subjective Information no complaints  -VK complains of;weakness;dyspnea;numbness  -VK    Document Type therapy note (daily note)  -VK therapy note (daily note)  -VK    Mode of Treatment individual therapy;physical therapy  -VK individual therapy;physical therapy  -VK    Patient Effort good  -VK good  -VK    Symptoms Noted During/After Treatment -- other (see comments)  Pt reports her LLE is numb and has been since admission.  -VK      Row Name 01/15/24 1430 01/15/24 1058       General Information    Patient Profile Reviewed yes  -VK yes  -VK      Row Name 01/15/24 1430 01/15/24 1058       Cognition    Affect/Mental Status (Cognition) WFL  -VK WFL;anxious  -VK    Behavioral Issues (Cognition) -- overwhelmed easily  -VK    Orientation Status (Cognition) -- oriented to;person;place;situation  -VK    Personal Safety Interventions -- fall prevention program maintained;gait belt;nonskid shoes/slippers when out of bed  -VK      Row Name 01/15/24 1430 01/15/24 1058       Bed Mobility    Bed Mobility scooting/bridging  -VK --    Scooting/Bridging [place] (Bed Mobility) maximum assist (25% patient effort);2 person assist  -VK --    Supine-Sit [place] (Bed Mobility) -- moderate assist (50% patient effort);1 person assist  -VK    Sit-Supine [place] (Bed Mobility) moderate assist (50% patient effort);1 person assist  -VK moderate assist (50% patient effort);1 person assist  -VK    Bed Mobility, Safety Issues decreased use of arms for pushing/pulling;decreased use of legs for bridging/pushing  -VK decreased use of arms for pushing/pulling;decreased use of legs for  bridging/pushing  -    Assistive Device (Bed Mobility) draw sheet;head of bed elevated;bed rails  -VK bed rails;head of bed elevated;draw sheet  -      Row Name 01/15/24 1430 01/15/24 1058       Transfers    Transfers chair-bed transfer  -VK sit-stand transfer;stand-sit transfer;toilet transfer  -      Row Name 01/15/24 1430          Chair-Bed Transfer    Chair-Bed [place] (Transfers) minimum assist (75% patient effort);moderate assist (50% patient effort);1 person assist  -     Assistive Device (Chair-Bed Transfers) walker, front-wheeled  -VK       Row Name 01/15/24 1430 01/15/24 1058       Sit-Stand Transfer    Sit-Stand [place] (Transfers) minimum assist (75% patient effort);moderate assist (50% patient effort);1 person assist;verbal cues  - minimum assist (75% patient effort);moderate assist (50% patient effort);verbal cues;1 person assist  -    Assistive Device (Sit-Stand Transfers) walker, front-wheeled  - walker, front-wheeled  -      Row Name 01/15/24 1430 01/15/24 1058       Stand-Sit Transfer    Stand-Sit [place] (Transfers) minimum assist (75% patient effort);moderate assist (50% patient effort);verbal cues;1 person assist  -VK minimum assist (75% patient effort);moderate assist (50% patient effort);verbal cues;1 person assist  -    Assistive Device (Stand-Sit Transfers) walker, front-wheeled  - walker, front-wheeled  -      Row Name 01/15/24 1058          Toilet Transfer    Type (Toilet Transfer) sit-stand;stand-sit  -     [place] Level (Toilet Transfer) minimum assist (75% patient effort);moderate assist (50% patient effort);verbal cues;1 person assist  -     Assistive Device (Toilet Transfer) commode, bedside without drop arms;walker, front-wheeled  -VK       Row Name 01/15/24 1058          Gait/Stairs (Locomotion)    Gait/Stairs Locomotion gait/ambulation assistive device  -     [place] Level (Gait) minimum assist (75% patient effort);moderate  assist (50% patient effort);1 person assist  -VK     Assistive Device (Gait) walker, front-wheeled  -VK     Patient was able to Ambulate yes  -VK     Distance in Feet (Gait) 4x2  -VK     Pattern (Gait) step-to  -VK     Deviations/Abnormal Patterns (Gait) gait speed decreased;stride length decreased;base of support, narrow  -VK     Bilateral Gait Deviations weight shift ability decreased;heel strike decreased;forward flexed posture  -VK     Gait Assessment/Intervention Pt becomes very fearful of falling with ambulation.  -VK       Row Name 01/15/24 1430 01/15/24 1058       Safety Issues, Functional Mobility    Impairments Affecting Function (Mobility) balance;strength;endurance/activity tolerance;pain  -VK balance;strength;endurance/activity tolerance;pain  -VK      Row Name 01/15/24 1058          Balance    Balance Assessment sitting static balance  -VK     Static Sitting Balance supervision;contact guard  -VK     Position, Sitting Balance sitting edge of bed  -VK       Row Name             Wound 01/09/24 0120 Left gluteal    Wound - Properties Group Placement Date: 01/09/24  -LK Placement Time: 0120  -LK Present on Original Admission: Y  -LK Side: Left  -LK Location: gluteal  -LK    Retired Wound - Properties Group Placement Date: 01/09/24  -LK Placement Time: 0120  -LK Present on Original Admission: Y  -LK Side: Left  -LK Location: gluteal  -LK    Retired Wound - Properties Group Date first assessed: 01/09/24  -LK Time first assessed: 0120  -LK Present on Original Admission: Y  -LK Side: Left  -LK Location: gluteal  -LK      Row Name             Wound 01/09/24 1008 Left anterior hip Pressure Injury    Wound - Properties Group Placement Date: 01/09/24  -NH Placement Time: 1008  -NH Side: Left  -NH Orientation: anterior  -NH Location: hip  -NH Primary Wound Type: Pressure inj  -NH    Retired Wound - Properties Group Placement Date: 01/09/24  -NH Placement Time: 1008  -NH Side: Left  -NH Orientation: anterior  -NH  Location: hip  -NH Primary Wound Type: Pressure inj  -NH    Retired Wound - Properties Group Date first assessed: 01/09/24  -NH Time first assessed: 1008  -NH Side: Left  -NH Location: hip  -NH Primary Wound Type: Pressure inj  -NH      Row Name             Wound 01/09/24 1008 Right anterior hip Pressure Injury    Wound - Properties Group Placement Date: 01/09/24  -NH Placement Time: 1008  -NH Side: Right  -NH Orientation: anterior  -NH Location: hip  -NH Primary Wound Type: Pressure inj  -NH    Retired Wound - Properties Group Placement Date: 01/09/24  -NH Placement Time: 1008  -NH Side: Right  -NH Orientation: anterior  -NH Location: hip  -NH Primary Wound Type: Pressure inj  -NH    Retired Wound - Properties Group Date first assessed: 01/09/24  -NH Time first assessed: 1008  -NH Side: Right  -NH Location: hip  -NH Primary Wound Type: Pressure inj  -NH      Row Name             Wound 01/09/24 1008 Left anterior foot Pressure Injury    Wound - Properties Group Placement Date: 01/09/24  -NH Placement Time: 1008  -NH Side: Left  -NH Orientation: anterior  -NH Location: foot  -NH Primary Wound Type: Pressure inj  -NH    Retired Wound - Properties Group Placement Date: 01/09/24  -NH Placement Time: 1008  -NH Side: Left  -NH Orientation: anterior  -NH Location: foot  -NH Primary Wound Type: Pressure inj  -NH    Retired Wound - Properties Group Date first assessed: 01/09/24  -NH Time first assessed: 1008  -NH Side: Left  -NH Location: foot  -NH Primary Wound Type: Pressure inj  -NH      Row Name             Wound 01/09/24 1008 Right gluteal Pressure Injury    Wound - Properties Group Placement Date: 01/09/24  -NH Placement Time: 1008  -NH Side: Right  -NH Location: gluteal  -NH Primary Wound Type: Pressure inj  -NH    Retired Wound - Properties Group Placement Date: 01/09/24  -NH Placement Time: 1008  -NH Side: Right  -NH Location: gluteal  -NH Primary Wound Type: Pressure inj  -NH    Retired Wound - Properties Group  Date first assessed: 01/09/24  -NH Time first assessed: 1008  -NH Side: Right  -NH Location: gluteal  -NH Primary Wound Type: Pressure inj  -NH      Row Name             Wound 01/09/24 1008 Right posterior thigh Pressure Injury    Wound - Properties Group Placement Date: 01/09/24  -NH Placement Time: 1008  -NH Side: Right  -NH Orientation: posterior  -NH Location: thigh  -NH Primary Wound Type: Pressure inj  -NH    Retired Wound - Properties Group Placement Date: 01/09/24  -NH Placement Time: 1008  -NH Side: Right  -NH Orientation: posterior  -NH Location: thigh  -NH Primary Wound Type: Pressure inj  -NH    Retired Wound - Properties Group Date first assessed: 01/09/24  -NH Time first assessed: 1008  -NH Side: Right  -NH Location: thigh  -NH Primary Wound Type: Pressure inj  -NH      Row Name 01/15/24 1430 01/15/24 1058       Positioning and Restraints    Pre-Treatment Position sitting in chair/recliner  -VK in bed  -VK    Post Treatment Position bed  -VK chair  -VK    In Bed supine;call light within reach;encouraged to call for assist;exit alarm on;notified nsg  -VK --    In Chair -- reclined;call light within reach;encouraged to call for assist;exit alarm on;notified nsg  -VK      Row Name 01/15/24 1430 01/15/24 1058       Progress Summary (PT)    Progress Toward Functional Goals (PT) progress toward functional goals is fair  -VK progress toward functional goals is fair  -VK    Daily Progress Summary (PT) Assisted pt from chair to bed for upcoming transfer. Pt continues to benefit from skilled PT to address stated deficits.  -VK Pt agreeable to treatment this date. Pt became very fearful of falling during transfers/ambulation, at times believing she was falling when she was not, pt was reassured she was safe. Pt requires a lot of encouragement to complete transfers. Pt cont to benefit from skilled PT to address stated deficits.  -VK              User Key  (r) = Recorded By, (t) = Taken By, (c) = Cosigned By       Initials Name Provider Type    Kylah Long, RN Registered Nurse    Erica Patel RN Registered Nurse    Ml Nobles PTA Physical Therapist Assistant                      PT Recommendation and Plan     Progress Summary (PT)  Progress Toward Functional Goals (PT): progress toward functional goals is fair  Daily Progress Summary (PT): Assisted pt from chair to bed for upcoming transfer. Pt continues to benefit from skilled PT to address stated deficits.   Outcome Measures       Row Name 01/15/24 1600 01/15/24 1300          How much help from another person do you currently need...    Turning from your back to your side while in flat bed without using bedrails? 2  -VK 2  -VK     Moving from lying on back to sitting on the side of a flat bed without bedrails? 2  -VK 2  -VK     Moving to and from a bed to a chair (including a wheelchair)? 2  -VK 2  -VK     Standing up from a chair using your arms (e.g., wheelchair, bedside chair)? 2  -VK 2  -VK     Climbing 3-5 steps with a railing? 1  -VK 1  -VK     To walk in hospital room? 2  -VK 2  -VK     AM-PAC 6 Clicks Score (PT) 11  -VK 11  -VK     Highest Level of Mobility Goal 4 --> Transfer to chair/commode  -VK 4 --> Transfer to chair/commode  -VK               User Key  (r) = Recorded By, (t) = Taken By, (c) = Cosigned By      Initials Name Provider Type    Ml Nobles PTA Physical Therapist Assistant                     Time Calculation:    PT Charges       Row Name 01/15/24 1635 01/15/24 1330          Time Calculation    PT Received On 01/15/24  -VK 01/15/24  -VK        Timed Charges    37828 - Gait Training Minutes  -- 20  -VK     10119 - PT Therapeutic Activity Minutes 15  -VK 29  -VK        Total Minutes    Timed Charges Total Minutes 15  -VK 49  -VK      Total Minutes 15  -VK 49  -VK               User Key  (r) = Recorded By, (t) = Taken By, (c) = Cosigned By      Initials Name Provider Type    Ml Nobles PTA Physical Therapist Assistant                   Therapy Charges for Today       Code Description Service Date Service Provider Modifiers Qty    15042110181 HC GAIT TRAINING EA 15 MIN 1/15/2024 Ml Borden, PTA GP 1    37772634328 HC PT THERAPEUTIC ACT EA 15 MIN 1/15/2024 Ml Borden, PTA GP 2    95725563483 HC PT THERAPEUTIC ACT EA 15 MIN 1/15/2024 Ml Borden, PTA GP 1            PT G-Codes  Outcome Measure Options: AM-PAC 6 Clicks Daily Activity (OT)  AM-PAC 6 Clicks Score (PT): 11  AM-PAC 6 Clicks Score (OT): 15    Ml Borden PTA  1/15/2024

## 2024-01-15 NOTE — PLAN OF CARE
Goal Outcome Evaluation:  Plan of Care Reviewed With: patient        Progress: no change. Rested fairly well. Still C/O weakness.

## 2024-01-15 NOTE — PLAN OF CARE
Goal Outcome Evaluation:   VSS. Pt treated for pain once during shift. Patient had no other complaints. Will continue to monitor

## 2024-01-15 NOTE — THERAPY TREATMENT NOTE
Patient Name: Lona Rodríguez  : 1950    MRN: [id number]                              Today's Date: 1/15/2024       Admit Date: 2024    Visit Dx:     ICD-10-CM ICD-9-CM   1. Acute hypokalemia  E87.6 276.8   2. Hypokalemic periodic paralysis  G72.3 359.3   3. Elevated LFTs  R79.89 790.6   4. Acute UTI (urinary tract infection)  N39.0 599.0   5. Decreased activities of daily living (ADL)  Z78.9 V49.89   6. Difficulty in walking  R26.2 719.7     Patient Active Problem List   Diagnosis    Intractable pain    Wrist fracture    Rib fractures    Lung nodule    Dyspnea    Closed fracture of one rib, unspecified laterality, initial encounter    Breast asymmetry in female    Breast skin changes    GERD (gastroesophageal reflux disease)    Chronic nausea    Pedal edema    Esophagitis determined by endoscopy    Hypokalemia    Periodic paralysis    Acute UTI (urinary tract infection)    Pressure ulcer, stage 2     Past Medical History:   Diagnosis Date    AMD (age related macular degeneration)     Anemia Following surgery    Arthritis     Asthma     Breast cancer Left breast.    Breast mass 2 lumpectomies 1 cancerous 1 benign    CHF (congestive heart failure) Just getting evaluation    Colon polyp Removed during colonos    COPD (chronic obstructive pulmonary disease)     Coronary artery disease Chf    Deep vein thrombosis 1979    Fibrocystic breast Left breast    Fibromyalgia     History of transfusion Aug 2019 2 units    Hyperlipidemia     Hypertension     Nausea and vomiting 2023    Obesity     Polymyalgia arteritica     Postoperative wound infection Lymph node resection site,5th toe left foot infection after neuroma surgery. 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    Sciatica      Past Surgical History:   Procedure Laterality Date    BREAST BIOPSY  Left    BREAST SURGERY  Lumpectomies left breast    ENDOSCOPY N/A 2023    Procedure: ESOPHAGOGASTRODUODENOSCOPY WITH BIOPSIES;  Surgeon: Maximino Aceves MD;  Location:   "[facility];  Service: Gastroenterology;  Laterality: N/A;  ESOPHAGITIS, GASTRITIS, HIATAL HERNIA    EYE SURGERY  Lens implants both eyes    REPLACEMENT TOTAL KNEE      TONSILLECTOMY        General Information       Row Name 01/15/24 1346          OT Time and Intention    Document Type therapy note (daily note)  -ES     Mode of Treatment individual therapy;occupational therapy  -ES       Row Name 01/15/24 1340          General Information    Existing Precautions/Restrictions fall  -ES       Row Name 01/15/24 1343          Cognition    Orientation Status (Cognition) oriented to;person;place;situation  Patient cooperative, agreeable to therapy participation. Patient is anxious throughout therapy session in relation to falling, frequent redirection and cueing provided.  -ES               User Key  (r) = Recorded By, (t) = Taken By, (c) = Cosigned By      Initials Name Provider Type    ES Shania Rushing, OTR/L, CSRS Occupational Therapist                     Mobility/ADL's       Row Name 01/15/24 3894          Bed Mobility    Bed Mobility supine-sit  -ES     Supine-Sit [place] (Bed Mobility) moderate assist (50% patient effort);1 person assist  -ES     Bed Mobility, Safety Issues decreased use of arms for pushing/pulling;decreased use of legs for bridging/pushing  -ES     Assistive Device (Bed Mobility) draw sheet;head of bed elevated;bed rails  -       Row Name 01/15/24 5052          Transfers    Transfers sit-stand transfer;stand-sit transfer  -ES     Comment, (Transfers) STS x6 completed this session in preperation for independent transfers and LE strength. Bed height elevated for sit <> stand. Requires max cueing for posturing and hand placement with sit <> stand, as patient frequently reports she is \"falling\" while in stance  -ES       Row Name 01/15/24 7821          Sit-Stand Transfer    Sit-Stand [place] (Transfers) minimum assist (75% patient effort);moderate assist (50% patient effort);1 person " assist  -ES     Assistive Device (Sit-Stand Transfers) walker, front-wheeled  -ES       Row Name 01/15/24 1354          Stand-Sit Transfer    Stand-Sit [place] (Transfers) minimum assist (75% patient effort);moderate assist (50% patient effort);verbal cues;1 person assist  -ES     Assistive Device (Stand-Sit Transfers) walker, front-wheeled  -ES               User Key  (r) = Recorded By, (t) = Taken By, (c) = Cosigned By      Initials Name Provider Type    Shania Grossman OTR/L, CSRS Occupational Therapist                   Obj/Interventions       Row Name 01/15/24 1404          Shoulder (Therapeutic Exercise)    Shoulder (Therapeutic Exercise) strengthening exercise  -ES     Shoulder Strengthening (Therapeutic Exercise) horizontal aBduction/aDduction;resisted diagonal exercise;2 sets;10 repetitions  -ES       Row Name 01/15/24 1404          Motor Skills    Therapeutic Exercise shoulder  -ES       Row Name 01/15/24 1404          Balance    Balance Assessment sitting dynamic balance;standing dynamic balance  -ES     Dynamic Sitting Balance standby assist  -ES     Position, Sitting Balance unsupported;sitting edge of bed  -ES     Dynamic Standing Balance minimal assist;moderate assist;1-person assist  -ES     Position/Device Used, Standing Balance supported;walker, front-wheeled  -ES               User Key  (r) = Recorded By, (t) = Taken By, (c) = Cosigned By      Initials Name Provider Type    Shania Grossman OTR/L, RADHA Occupational Therapist                   Goals/Plan    No documentation.                  Clinical Impression       Row Name 01/15/24 1410          Plan of Care Review    Plan of Care Reviewed With patient  -ES     Outcome Evaluation Patient with fair participation in therapy session. Patient with decreased assist level required with bed mobility and transfers, demonstrating improvements with strength and tolerance from initial evaluation. patient completes x6 STS, minimal assist provided.  Patient continues to demonstrate fear of falling, limiting patient participation in mobility.  -ES       Row Name 01/15/24 1410          Therapy Plan Review/Discharge Plan (OT)    Anticipated Discharge Disposition (OT) sub acute care setting  -ES               User Key  (r) = Recorded By, (t) = Taken By, (c) = Cosigned By      Initials Name Provider Type    ES Shania Rushing, OTR/L, CSRS Occupational Therapist                   Outcome Measures       Row Name 01/15/24 1412          How much help from another is currently needed...    Putting on and taking off regular lower body clothing? 2  -ES     Bathing (including washing, rinsing, and drying) 2  -ES     Toileting (which includes using toilet bed pan or urinal) 1  -ES     Putting on and taking off regular upper body clothing 3  -ES     Taking care of personal grooming (such as brushing teeth) 3  -ES     Eating meals 4  -ES     AM-PAC 6 Clicks Score (OT) 15  -ES       Row Name 01/15/24 1300 01/15/24 0731       How much help from another person do you currently need...    Turning from your back to your side while in flat bed without using bedrails? 2  -VK 2  -AC    Moving from lying on back to sitting on the side of a flat bed without bedrails? 2  -VK 2  -AC    Moving to and from a bed to a chair (including a wheelchair)? 2  -VK 2  -AC    Standing up from a chair using your arms (e.g., wheelchair, bedside chair)? 2  -VK 2  -AC    Climbing 3-5 steps with a railing? 1  -VK 1  -AC    To walk in hospital room? 2  -VK 1  -AC    AM-PAC 6 Clicks Score (PT) 11  -VK 10  -AC    Highest Level of Mobility Goal 4 --> Transfer to chair/commode  -VK 4 --> Transfer to chair/commode  -AC      Row Name 01/15/24 1412          Functional Assessment    Outcome Measure Options AM-PAC 6 Clicks Daily Activity (OT)  -ES               User Key  (r) = Recorded By, (t) = Taken By, (c) = Cosigned By      Initials Name Provider Type    ES Shania Rushing, OTR/L, CSRS Occupational Therapist    VK  Ml Borden PTA Physical Therapist Assistant    Umu Rodrigues RNA Registered Nurse                      OT Recommendation and Plan  Planned Therapy Interventions (OT): activity tolerance training, BADL retraining, functional balance retraining, occupation/activity based interventions, patient/caregiver education/training, strengthening exercise, transfer/mobility retraining  Therapy Frequency (OT): 5 times/wk  Plan of Care Review  Plan of Care Reviewed With: patient  Progress: improving  Outcome Evaluation: Patient with fair participation in therapy session. Patient with decreased assist level required with bed mobility and transfers, demonstrating improvements with strength and tolerance from initial evaluation. patient completes x6 STS, minimal assist provided. Patient continues to demonstrate fear of falling, limiting patient participation in mobility.     Time Calculation:   Evaluation Complexity (OT)  Review Occupational Profile/Medical/Therapy History Complexity: brief/low complexity  Assessment, Occupational Performance/Identification of Deficit Complexity: 3-5 performance deficits  Clinical Decision Making Complexity (OT): problem focused assessment/low complexity  Overall Complexity of Evaluation (OT): low complexity     Time Calculation- OT       Row Name 01/15/24 1412 01/15/24 1330          Time Calculation- OT    OT Received On 01/15/24  -ES --     OT Goal Re-Cert Due Date 01/19/24  -ES --        Timed Charges    00102 - OT Therapeutic Exercise Minutes 10  -ES --     74494 - Gait Training Minutes  -- 20  -VK     45404 - OT Self Care/Mgmt Minutes 15  -ES --        Total Minutes    Timed Charges Total Minutes 25  -ES 20  -VK      Total Minutes 25  -ES 20  -VK               User Key  (r) = Recorded By, (t) = Taken By, (c) = Cosigned By      Initials Name Provider Type    Shania Grossman, OTR/L, CSRS Occupational Therapist    Ml Nobles PTA Physical Therapist Assistant                   Therapy Charges for Today       Code Description Service Date Service Provider Modifiers Qty    74558567772  OT THER PROC EA 15 MIN 1/15/2024 Shania Rushing OTR/L, CSRS GO 1    49561495654 HC OT SELF CARE/MGMT/TRAIN EA 15 MIN 1/15/2024 Shania Rushing OTR/L, CSRS GO 1                 Shania Rushing OTR/L, CSRS  1/15/2024

## 2024-01-15 NOTE — SIGNIFICANT NOTE
01/15/24 1200   Coping/Psychosocial   Observed Emotional State calm;cooperative   Verbalized Emotional State hopefulness   Trust Relationship/Rapport empathic listening provided   Involvement in Care interacting with patient   Additional Documentation Spiritual Care (Group)   Spiritual Care   Use of Spiritual Resources non-Spiritism use of spiritual care   Spiritual Care Source  initiative   Spiritual Care Follow-Up follow-up, none required as presently assessed   Response to Spiritual Care receptive of support;engaged in conversation;thanks expressed   Spiritual Care Interventions supportive conversation provided   Spiritual Care Visit Type initial   Receptivity to Spiritual Care visit welcomed

## 2024-01-16 ENCOUNTER — TELEPHONE (OUTPATIENT)
Dept: GASTROENTEROLOGY | Facility: CLINIC | Age: 74
End: 2024-01-16
Payer: MEDICARE

## 2024-01-16 ENCOUNTER — APPOINTMENT (OUTPATIENT)
Dept: GENERAL RADIOLOGY | Facility: HOSPITAL | Age: 74
End: 2024-01-16
Payer: MEDICARE

## 2024-01-16 LAB
ALBUMIN SERPL-MCNC: 2.1 G/DL (ref 3.5–5.2)
ALBUMIN/GLOB SERPL: 0.8 G/DL
ALP SERPL-CCNC: 98 U/L (ref 39–117)
ALT SERPL W P-5'-P-CCNC: 18 U/L (ref 1–33)
ANION GAP SERPL CALCULATED.3IONS-SCNC: 8.3 MMOL/L (ref 5–15)
ANISOCYTOSIS BLD QL: NORMAL
AST SERPL-CCNC: 17 U/L (ref 1–32)
BASOPHILS # BLD AUTO: 0.03 10*3/MM3 (ref 0–0.2)
BASOPHILS NFR BLD AUTO: 0.5 % (ref 0–1.5)
BILIRUB SERPL-MCNC: 0.3 MG/DL (ref 0–1.2)
BUN SERPL-MCNC: 7 MG/DL (ref 8–23)
BUN/CREAT SERPL: 12.7 (ref 7–25)
CALCIUM SPEC-SCNC: 8.3 MG/DL (ref 8.6–10.5)
CHLORIDE SERPL-SCNC: 106 MMOL/L (ref 98–107)
CO2 SERPL-SCNC: 22.7 MMOL/L (ref 22–29)
CREAT SERPL-MCNC: 0.55 MG/DL (ref 0.57–1)
DEPRECATED RDW RBC AUTO: 75.7 FL (ref 37–54)
EGFRCR SERPLBLD CKD-EPI 2021: 96.9 ML/MIN/1.73
EOSINOPHIL # BLD AUTO: 0.2 10*3/MM3 (ref 0–0.4)
EOSINOPHIL NFR BLD AUTO: 3.2 % (ref 0.3–6.2)
ERYTHROCYTE [DISTWIDTH] IN BLOOD BY AUTOMATED COUNT: 22.5 % (ref 12.3–15.4)
GLOBULIN UR ELPH-MCNC: 2.5 GM/DL
GLUCOSE SERPL-MCNC: 90 MG/DL (ref 65–99)
HCT VFR BLD AUTO: 25.2 % (ref 34–46.6)
HGB BLD-MCNC: 8.5 G/DL (ref 12–15.9)
IMM GRANULOCYTES # BLD AUTO: 0.04 10*3/MM3 (ref 0–0.05)
IMM GRANULOCYTES NFR BLD AUTO: 0.6 % (ref 0–0.5)
LYMPHOCYTES # BLD AUTO: 1.21 10*3/MM3 (ref 0.7–3.1)
LYMPHOCYTES NFR BLD AUTO: 19.4 % (ref 19.6–45.3)
MCH RBC QN AUTO: 31.7 PG (ref 26.6–33)
MCHC RBC AUTO-ENTMCNC: 33.7 G/DL (ref 31.5–35.7)
MCV RBC AUTO: 94 FL (ref 79–97)
MONOCYTES # BLD AUTO: 1 10*3/MM3 (ref 0.1–0.9)
MONOCYTES NFR BLD AUTO: 16 % (ref 5–12)
NEUTROPHILS NFR BLD AUTO: 3.77 10*3/MM3 (ref 1.7–7)
NEUTROPHILS NFR BLD AUTO: 60.3 % (ref 42.7–76)
NRBC BLD AUTO-RTO: 0 /100 WBC (ref 0–0.2)
PLATELET # BLD AUTO: 257 10*3/MM3 (ref 140–450)
PMV BLD AUTO: 9.1 FL (ref 6–12)
POTASSIUM SERPL-SCNC: 3.8 MMOL/L (ref 3.5–5.2)
PROT SERPL-MCNC: 4.6 G/DL (ref 6–8.5)
RBC # BLD AUTO: 2.68 10*6/MM3 (ref 3.77–5.28)
SMALL PLATELETS BLD QL SMEAR: ADEQUATE
SODIUM SERPL-SCNC: 137 MMOL/L (ref 136–145)
WBC MORPH BLD: NORMAL
WBC NRBC COR # BLD AUTO: 6.25 10*3/MM3 (ref 3.4–10.8)

## 2024-01-16 PROCEDURE — 80053 COMPREHEN METABOLIC PANEL: CPT | Performed by: INTERNAL MEDICINE

## 2024-01-16 PROCEDURE — 85025 COMPLETE CBC W/AUTO DIFF WBC: CPT | Performed by: INTERNAL MEDICINE

## 2024-01-16 PROCEDURE — 72100 X-RAY EXAM L-S SPINE 2/3 VWS: CPT

## 2024-01-16 PROCEDURE — 85007 BL SMEAR W/DIFF WBC COUNT: CPT | Performed by: INTERNAL MEDICINE

## 2024-01-16 PROCEDURE — 72170 X-RAY EXAM OF PELVIS: CPT

## 2024-01-16 PROCEDURE — 25010000002 ENOXAPARIN PER 10 MG: Performed by: INTERNAL MEDICINE

## 2024-01-16 RX ORDER — POTASSIUM CHLORIDE 750 MG/1
10 CAPSULE, EXTENDED RELEASE ORAL DAILY
Status: COMPLETED | OUTPATIENT
Start: 2024-01-16 | End: 2024-01-18

## 2024-01-16 RX ORDER — HYDROCODONE BITARTRATE AND ACETAMINOPHEN 5; 325 MG/1; MG/1
1 TABLET ORAL EVERY 4 HOURS PRN
Status: DISPENSED | OUTPATIENT
Start: 2024-01-16 | End: 2024-01-16

## 2024-01-16 RX ORDER — HYDROCODONE BITARTRATE AND ACETAMINOPHEN 5; 325 MG/1; MG/1
1 TABLET ORAL EVERY 8 HOURS PRN
Status: DISCONTINUED | OUTPATIENT
Start: 2024-01-16 | End: 2024-01-19 | Stop reason: HOSPADM

## 2024-01-16 RX ADMIN — Medication 1 MG: at 08:35

## 2024-01-16 RX ADMIN — METHOCARBAMOL 500 MG: 500 TABLET ORAL at 21:39

## 2024-01-16 RX ADMIN — Medication 10 ML: at 21:35

## 2024-01-16 RX ADMIN — FAMOTIDINE 20 MG: 20 TABLET ORAL at 08:35

## 2024-01-16 RX ADMIN — FERROUS SULFATE TAB 325 MG (65 MG ELEMENTAL FE) 325 MG: 325 (65 FE) TAB at 08:34

## 2024-01-16 RX ADMIN — ENOXAPARIN SODIUM 40 MG: 100 INJECTION SUBCUTANEOUS at 08:34

## 2024-01-16 RX ADMIN — Medication 10 ML: at 08:35

## 2024-01-16 RX ADMIN — AMITRIPTYLINE HYDROCHLORIDE 50 MG: 50 TABLET, FILM COATED ORAL at 21:35

## 2024-01-16 RX ADMIN — ACETAMINOPHEN 650 MG: 325 TABLET ORAL at 00:38

## 2024-01-16 RX ADMIN — MONTELUKAST 10 MG: 10 TABLET, FILM COATED ORAL at 21:35

## 2024-01-16 RX ADMIN — FERROUS SULFATE TAB 325 MG (65 MG ELEMENTAL FE) 325 MG: 325 (65 FE) TAB at 17:32

## 2024-01-16 RX ADMIN — ATORVASTATIN CALCIUM 20 MG: 20 TABLET, FILM COATED ORAL at 21:36

## 2024-01-16 RX ADMIN — HYDROCODONE BITARTRATE AND ACETAMINOPHEN 1 TABLET: 5; 325 TABLET ORAL at 16:32

## 2024-01-16 RX ADMIN — SPIRONOLACTONE 25 MG: 25 TABLET ORAL at 08:35

## 2024-01-16 RX ADMIN — LEVOTHYROXINE SODIUM 75 MCG: 75 TABLET ORAL at 06:38

## 2024-01-16 RX ADMIN — ACETAMINOPHEN 650 MG: 325 TABLET ORAL at 21:34

## 2024-01-16 RX ADMIN — METHOCARBAMOL 500 MG: 500 TABLET ORAL at 08:34

## 2024-01-16 RX ADMIN — POTASSIUM CHLORIDE 10 MEQ: 10 CAPSULE, COATED, EXTENDED RELEASE ORAL at 10:00

## 2024-01-16 RX ADMIN — ACETAMINOPHEN 650 MG: 325 TABLET ORAL at 13:09

## 2024-01-16 RX ADMIN — HYDROCODONE BITARTRATE AND ACETAMINOPHEN 1 TABLET: 5; 325 TABLET ORAL at 02:58

## 2024-01-16 NOTE — TELEPHONE ENCOUNTER
Lona Rodríguez  1950    Patient requested to Cancel their EGD. I have offered to reschedule this patient and patient has agreed. Once patient is discharged from the hospital, she will spend some time in rehab. I will call patient back in 2 weeks and try to work her in asap.    Reason for cancelling/rescheduling: Currently Inpatient    This procedure was ordered by Les Henderson MD for an important reason. We want to inform you that there are risks associated with not proceeding with the procedure at this time such as a delay in diagnosis, risk of incurable disease, or cancer.    Patient plans to call us back to reschedule: I will call patient back to r/s.    Updated clearance needed?: Yes    If yes, clearance request will be submitted to: Dr. Greene     Is the patient currently on any injectable medications for weight loss or diabetes? No    Patient verbalized understanding for all of the above information.    Postponing Patient Call until 2.1.24

## 2024-01-16 NOTE — SIGNIFICANT NOTE
Wound Eval / Progress Noted     Michael     Patient Name: Lona Rodríguez  : 1950  MRN: [id number]  Today's Date: 2024                 Admit Date: 2024    Visit Dx:    ICD-10-CM ICD-9-CM   1. Acute hypokalemia  E87.6 276.8   2. Hypokalemic periodic paralysis  G72.3 359.3   3. Elevated LFTs  R79.89 790.6   4. Acute UTI (urinary tract infection)  N39.0 599.0   5. Decreased activities of daily living (ADL)  Z78.9 V49.89   6. Difficulty in walking  R26.2 719.7         Hypokalemia    Periodic paralysis    Acute UTI (urinary tract infection)    Pressure ulcer, stage 2        Past Medical History:   Diagnosis Date    AMD (age related macular degeneration)     Anemia Following surgery    Arthritis     Asthma     Breast cancer Left breast.    Breast mass 2 lumpectomies 1 cancerous 1 benign    CHF (congestive heart failure) Just getting evaluation    Colon polyp Removed during colonos    COPD (chronic obstructive pulmonary disease)     Coronary artery disease Chf    Deep vein thrombosis 1979    Fibrocystic breast Left breast    Fibromyalgia     History of transfusion Aug 2019 2 units    Hyperlipidemia     Hypertension     Nausea and vomiting 2023    Obesity     Polymyalgia arteritica     Postoperative wound infection Lymph node resection site,5th toe left foot infection after neuroma surgery. 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;  Service: Gastroenterology;  Laterality: N/A;  ESOPHAGITIS, GASTRITIS, HIATAL HERNIA    EYE SURGERY  Lens implants both eyes    REPLACEMENT TOTAL KNEE      TONSILLECTOMY           Physical Assessment:     24 1235   Wound 24 0120 Left gluteal   Placement Date/Time: 24 012   Present on Original  Admission: Yes  Side: Left  Location: gluteal   Wound Image    Dressing Appearance intact;moist drainage   Closure None   Base moist;red   Periwound blanchable;redness;moist   Periwound Temperature warm   Periwound Skin Turgor soft   Edges open   Wound Length (cm) 0.6 cm   Wound Width (cm) 0.4 cm   Wound Depth (cm) 0.1 cm   Wound Surface Area (cm^2) 0.24 cm^2   Wound Volume (cm^3) 0.024 cm^3   Drainage Characteristics/Odor serosanguineous   Drainage Amount small   Care, Wound cleansed with;sterile normal saline   Dressing Care dressing applied;border dressing;hydrofiber;silver impregnated;silicone   Periwound Care absorptive dressing applied   Wound 01/09/24 1008 Left anterior hip Pressure Injury   Placement Date/Time: 01/09/24 1008   Side: Left  Orientation: anterior  Location: hip  Primary Wound Type: Pressure Injury   Wound Image    Dressing Appearance open to air   Closure None   Base maroon/purple;dry;red;non-blanchable   Periwound intact;pink;blanchable   Periwound Temperature warm   Periwound Skin Turgor soft   Edges rolled/closed   Wound Length (cm) 12 cm  (6.2cm more proximal smaller wound)   Wound Width (cm) 0.5 cm  (0.5cm more proximal, smaller wound)   Wound Surface Area (cm^2) 6 cm^2   Drainage Amount none   Care, Wound cleansed with;sterile normal saline   Dressing Care open to air   Periwound Care barrier ointment applied   Wound 01/09/24 1008 Right anterior hip Pressure Injury   Placement Date/Time: 01/09/24 1008   Side: Right  Orientation: anterior  Location: hip  Primary Wound Type: Pressure Injury   Dressing Appearance open to air   Closure None   Base blanchable;red   Periwound intact;blanchable;pink   Periwound Temperature warm   Periwound Skin Turgor soft   Edges rolled/closed   Drainage Amount none   Wound 01/09/24 1008 Left anterior foot Pressure Injury   Placement Date/Time: 01/09/24 1008   Side: Left  Orientation: anterior  Location: foot  Primary Wound Type: Pressure Injury   Wound Image     Dressing Appearance intact;no drainage   Closure None   Base maroon/purple;red;non-blanchable;dry   Periwound intact;pink   Periwound Temperature warm   Periwound Skin Turgor soft   Edges rolled/closed;open   Wound Length (cm) 0.8 cm   Wound Width (cm) 7.3 cm   Wound Depth (cm) 0.1 cm   Wound Surface Area (cm^2) 5.84 cm^2   Wound Volume (cm^3) 0.584 cm^3   Drainage Amount none   Care, Wound cleansed with;sterile normal saline   Dressing Care dressing applied;border dressing;non-adherent;petroleum-based;silicone   Periwound Care absorptive dressing applied   Wound 01/09/24 1008 Right gluteal Pressure Injury   Placement Date/Time: 01/09/24 1008   Side: Right  Location: gluteal  Primary Wound Type: Pressure Injury   Dressing Appearance intact;moist drainage   Closure None   Base moist;red   Periwound blanchable;redness;moist   Periwound Temperature warm   Periwound Skin Turgor soft   Edges open   Wound Length (cm) 0.7 cm   Wound Width (cm) 1 cm   Wound Depth (cm) 0.1 cm   Wound Surface Area (cm^2) 0.7 cm^2   Wound Volume (cm^3) 0.07 cm^3   Drainage Characteristics/Odor serosanguineous   Drainage Amount scant   Care, Wound cleansed with;sterile normal saline   Dressing Care dressing applied;border dressing;hydrofiber;silver impregnated;silicone   Periwound Care barrier ointment applied   Wound 01/09/24 1008 Right posterior thigh Pressure Injury   Placement Date/Time: 01/09/24 1008   Side: Right  Orientation: posterior  Location: thigh  Primary Wound Type: Pressure Injury   Dressing Appearance open to air   Closure None   Base blanchable;red   Periwound moist;redness;warm   Periwound Temperature warm   Periwound Skin Turgor soft   Edges rolled/closed   Drainage Amount none   Care, Wound cleansed with;sterile normal saline   Periwound Care barrier ointment applied   Wound 01/16/24 1235 medial coccyx Pressure Injury   Placement Date/Time: 01/16/24 1235   Orientation: medial  Location: coccyx  Primary Wound Type: Pressure  Injury   Pressure Injury Stage 3   Dressing Appearance intact;moist drainage   Closure None   Base moist;slough;yellow;red   Periwound blanchable;redness;warm;moist   Periwound Temperature warm   Periwound Skin Turgor soft   Edges open   Wound Length (cm) 1.3 cm  (distal coccyx / gluteal crease 2cm)   Wound Width (cm) 0.5 cm  (distal coccyx / gluteal crease 0.5cm)   Wound Depth (cm) 0.1 cm  (distal coccyx / gluteal crease 0.1cm)   Wound Surface Area (cm^2) 0.65 cm^2   Wound Volume (cm^3) 0.065 cm^3   Drainage Characteristics/Odor serosanguineous;serous   Drainage Amount small   Care, Wound cleansed with;sterile normal saline   Dressing Care dressing applied;border dressing;hydrofiber;silver impregnated;silicone   Periwound Care barrier ointment applied        Wound Check / Follow-up:  Patient seen today for wound follow-up. Patient is currently lying in bed awake, alert and oriented.     She has DTI to left dorsal foot as well as to left lateral thigh / hip area. To left dorsal foot, tissue has began to slightly open, although the tissue discoloration is slightly improved from previously. Now recommending daily dressing with non-adherent petroleum based gauze and silicone border dressing. To Left lateral thigh / hip recommending continued application of barrier ointment.     Posterior gluteal aspects with red moist blanchable tissue also revealing slight maceration. No pressure injury noted at this time. Recommending application of barrier for skin protection and moisture prevention.     There are Stage II pressure injuries to bilateral gluteal aspects as well as a Stage III to coccyx and Stage II to medial coccyx more distally. Cleansed with NS and gauze. Recommending daily dressing with silver impregnated hydrofiber and silicone border dressing.       Impression: Pressure injuries to left dorsal foot, left thigh / hip, bilateral and medial gluteal aspects.     Short term goals:  Regain skin integrity. Skin  protection, moisture prevention, pressure reduction, Daily dressing changes    Luz Fulton RN    1/16/2024    16:38 EST

## 2024-01-16 NOTE — PROGRESS NOTES
[facility]     Progress Note    Patient Name: Lona Rodríguez  : 1950  MRN: [id number]  Primary Care Physician:  Christopher Thao MD  Date of admission: 2024      Subjective   Brief summary.  Patient admitted with severe hypokalemia and subsequently had muscle aches and pain, wants to go to rehab      HPI:  Patient Complains of hip pain, left hip hurts and left leg hurts.  Patient reports paramedics dropped her and since then the pain is worse.  She reports no x-rays were done even though she notify the ER.  Continues to have leg cramps requesting pain meds more often      Review of Systems     Chronic aches and pains, no chest pain no shortness of breath.  Hip pain low back pain      Objective     Vitals:   Temp:  [97.3 °F (36.3 °C)-98.6 °F (37 °C)] 97.3 °F (36.3 °C)  Heart Rate:  [] 95  Resp:  [18-20] 18  BP: (105-113)/(53-77) 113/65    Physical Exam :     Elderly female not in acute distress.    Awake alert and oriented.    Heart regular.  Lungs clear.  Abdomen soft obese.  Nontender.  Neuro awake alert and oriented.  Extremities trace of edema, tenderness on the left hip range of movement limited, chronically bedridden difficult to assess      Result Review:  I have personally reviewed the results from the time of this admission to 2024 17:17 EST and agree with these findings:  []  Laboratory  []  Microbiology  []  Radiology  []  EKG/Telemetry   []  Cardiology/Vascular   []  Pathology  []  Old records  []  Other:      Assessment / Plan       Active Hospital Problems:  Active Hospital Problems    Diagnosis     Pressure ulcer, stage 2     Acute UTI (urinary tract infection)     Periodic paralysis     Hypokalemia        Plan:   Overall patient stable potassium normalized.  Slowly drifting down again we will restart potassium supplements.  Patient now complains of hip pain, will proceed with x-rays.  Continue pain meds.  Waiting for placement.    DVT prophylaxis:  Medical DVT prophylaxis  orders are present.    CODE STATUS:   Code Status (Patient has no pulse and is not breathing): CPR (Attempt to Resuscitate)  Medical Interventions (Patient has pulse or is breathing): Full Support                  Electronically signed by Luiz Diana MD, 01/16/24, 5:18 PM EST.

## 2024-01-16 NOTE — PLAN OF CARE
Goal Outcome Evaluation:  Plan of Care Reviewed With: patient        Progress: no change  Outcome Evaluation: Patient resting in bed, pt c/o pain in LLE states it is muscle paim. Patient alert and oriented, call light within reach.

## 2024-01-16 NOTE — PLAN OF CARE
Goal Outcome Evaluation:   VSS. Patient treated for pain once during shift. Pt reports anxiety with turns. Pt reports anxiety with transfers. Patient had no other complaints or acute events. Will continue to monitor. DSG change performed by wound care nurse this shift

## 2024-01-17 ENCOUNTER — APPOINTMENT (OUTPATIENT)
Dept: MRI IMAGING | Facility: HOSPITAL | Age: 74
End: 2024-01-17
Payer: MEDICARE

## 2024-01-17 PROCEDURE — 97530 THERAPEUTIC ACTIVITIES: CPT

## 2024-01-17 PROCEDURE — 72148 MRI LUMBAR SPINE W/O DYE: CPT

## 2024-01-17 PROCEDURE — 97110 THERAPEUTIC EXERCISES: CPT

## 2024-01-17 PROCEDURE — 25010000002 ENOXAPARIN PER 10 MG: Performed by: INTERNAL MEDICINE

## 2024-01-17 RX ADMIN — SPIRONOLACTONE 25 MG: 25 TABLET ORAL at 09:21

## 2024-01-17 RX ADMIN — HYDROCODONE BITARTRATE AND ACETAMINOPHEN 1 TABLET: 5; 325 TABLET ORAL at 00:22

## 2024-01-17 RX ADMIN — METHOCARBAMOL 500 MG: 500 TABLET ORAL at 20:10

## 2024-01-17 RX ADMIN — HYDROCODONE BITARTRATE AND ACETAMINOPHEN 1 TABLET: 5; 325 TABLET ORAL at 11:18

## 2024-01-17 RX ADMIN — METHOCARBAMOL 500 MG: 500 TABLET ORAL at 10:09

## 2024-01-17 RX ADMIN — FAMOTIDINE 20 MG: 20 TABLET ORAL at 09:21

## 2024-01-17 RX ADMIN — ENOXAPARIN SODIUM 40 MG: 100 INJECTION SUBCUTANEOUS at 09:20

## 2024-01-17 RX ADMIN — Medication 10 ML: at 09:20

## 2024-01-17 RX ADMIN — POTASSIUM CHLORIDE 10 MEQ: 10 CAPSULE, COATED, EXTENDED RELEASE ORAL at 09:21

## 2024-01-17 RX ADMIN — DOCUSATE SODIUM 50MG AND SENNOSIDES 8.6MG 2 TABLET: 8.6; 5 TABLET, FILM COATED ORAL at 20:10

## 2024-01-17 RX ADMIN — Medication 10 ML: at 20:10

## 2024-01-17 RX ADMIN — ACETAMINOPHEN 650 MG: 325 TABLET ORAL at 23:29

## 2024-01-17 RX ADMIN — ATORVASTATIN CALCIUM 20 MG: 20 TABLET, FILM COATED ORAL at 20:10

## 2024-01-17 RX ADMIN — AMITRIPTYLINE HYDROCHLORIDE 50 MG: 50 TABLET, FILM COATED ORAL at 20:10

## 2024-01-17 RX ADMIN — MONTELUKAST 10 MG: 10 TABLET, FILM COATED ORAL at 20:10

## 2024-01-17 RX ADMIN — HYDROCODONE BITARTRATE AND ACETAMINOPHEN 1 TABLET: 5; 325 TABLET ORAL at 20:10

## 2024-01-17 RX ADMIN — FERROUS SULFATE TAB 325 MG (65 MG ELEMENTAL FE) 325 MG: 325 (65 FE) TAB at 17:44

## 2024-01-17 RX ADMIN — Medication 1 MG: at 09:21

## 2024-01-17 RX ADMIN — FERROUS SULFATE TAB 325 MG (65 MG ELEMENTAL FE) 325 MG: 325 (65 FE) TAB at 09:21

## 2024-01-17 RX ADMIN — LEVOTHYROXINE SODIUM 75 MCG: 75 TABLET ORAL at 06:20

## 2024-01-17 NOTE — PLAN OF CARE
Goal Outcome Evaluation:  Plan of Care Reviewed With: patient has scheduled PRN and scheduled pain medication given on the shift, Pt stated that she still having pain on her coccyx area, Dressing changed has been done this morning patient is on turning scheduled  every two hours to prevent further breakdown to the coccyx area, plan of care on going.

## 2024-01-17 NOTE — PLAN OF CARE
Goal Outcome Evaluation:   VSS. Pt treated for pain once. Pt reports anxiety with transfer and rolling. Pt had no other complaints or acute events. Will continue to monitor

## 2024-01-17 NOTE — THERAPY TREATMENT NOTE
Patient Name: Lona Rodríguez  : 1950    MRN: [id number]                              Today's Date: 2024       Admit Date: 2024    Visit Dx:     ICD-10-CM ICD-9-CM   1. Acute hypokalemia  E87.6 276.8   2. Hypokalemic periodic paralysis  G72.3 359.3   3. Elevated LFTs  R79.89 790.6   4. Acute UTI (urinary tract infection)  N39.0 599.0   5. Decreased activities of daily living (ADL)  Z78.9 V49.89   6. Difficulty in walking  R26.2 719.7     Patient Active Problem List   Diagnosis    Intractable pain    Wrist fracture    Rib fractures    Lung nodule    Dyspnea    Closed fracture of one rib, unspecified laterality, initial encounter    Breast asymmetry in female    Breast skin changes    GERD (gastroesophageal reflux disease)    Chronic nausea    Pedal edema    Esophagitis determined by endoscopy    Hypokalemia    Periodic paralysis    Acute UTI (urinary tract infection)    Pressure ulcer, stage 2     Past Medical History:   Diagnosis Date    AMD (age related macular degeneration)     Anemia Following surgery    Arthritis     Asthma     Breast cancer Left breast.    Breast mass 2 lumpectomies 1 cancerous 1 benign    CHF (congestive heart failure) Just getting evaluation    Colon polyp Removed during colonos    COPD (chronic obstructive pulmonary disease)     Coronary artery disease Chf    Deep vein thrombosis 1979    Fibrocystic breast Left breast    Fibromyalgia     History of transfusion Aug 2019 2 units    Hyperlipidemia     Hypertension     Nausea and vomiting 2023    Obesity     Polymyalgia arteritica     Postoperative wound infection Lymph node resection site,5th toe left foot infection after neuroma surgery. 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    Sciatica      Past Surgical History:   Procedure Laterality Date    BREAST BIOPSY  Left    BREAST SURGERY  Lumpectomies left breast    ENDOSCOPY N/A 2023    Procedure: ESOPHAGOGASTRODUODENOSCOPY WITH BIOPSIES;  Surgeon: Maximino Aceves MD;  Location:   [facility];  Service: Gastroenterology;  Laterality: N/A;  ESOPHAGITIS, GASTRITIS, HIATAL HERNIA    EYE SURGERY  Lens implants both eyes    REPLACEMENT TOTAL KNEE      TONSILLECTOMY        General Information       Row Name 01/17/24 1252          OT Time and Intention    Document Type therapy note (daily note)  -PG     Mode of Treatment individual therapy;occupational therapy  -PG               User Key  (r) = Recorded By, (t) = Taken By, (c) = Cosigned By      Initials Name Provider Type    PG Raudel Bartlett OT Occupational Therapist                     Mobility/ADL's       Row Name 01/17/24 1252          Transfers    Transfers bed-chair transfer  -PG       Row Name 01/17/24 1252          Bed-Chair Transfer    Bed-Chair [place] (Transfers) verbal cues;minimum assist (75% patient effort);2 person assist  -PG     Assistive Device (Bed-Chair Transfers) walker, front-wheeled  -PG               User Key  (r) = Recorded By, (t) = Taken By, (c) = Cosigned By      Initials Name Provider Type    PG Raudel Bartlett OT Occupational Therapist                   Obj/Interventions       Row Name 01/17/24 1252          Shoulder (Therapeutic Exercise)    Shoulder (Therapeutic Exercise) strengthening exercise  -PG     Shoulder Strengthening (Therapeutic Exercise) 1 lb free weight;15 repititions  -PG       Row Name 01/17/24 1252          Elbow/Forearm (Therapeutic Exercise)    Elbow/Forearm (Therapeutic Exercise) strengthening exercise  -PG     Elbow/Forearm Strengthening (Therapeutic Exercise) 1 lb free weight;15 repititions  -PG       Row Name 01/17/24 1252          Motor Skills    Therapeutic Exercise shoulder;elbow/forearm  -PG               User Key  (r) = Recorded By, (t) = Taken By, (c) = Cosigned By      Initials Name Provider Type    PG Raudel Bartlett OT Occupational Therapist                   Goals/Plan    No documentation.                  Clinical Impression       Row Name 01/17/24 1252          Plan of Care  Review    Outcome Evaluation Patient able to complete bed to recliner transfer with minimal assist x 2 and rolling walker for support.  Patient will continue to benefit from inpatient rehab before returning home  -PG               User Key  (r) = Recorded By, (t) = Taken By, (c) = Cosigned By      Initials Name Provider Type    PG Raudel Bartlett OT Occupational Therapist                   Outcome Measures       Row Name 01/17/24 0922          How much help from another person do you currently need...    Turning from your back to your side while in flat bed without using bedrails? 2  -RL     Moving from lying on back to sitting on the side of a flat bed without bedrails? 2  -RL     Moving to and from a bed to a chair (including a wheelchair)? 2  -RL     Standing up from a chair using your arms (e.g., wheelchair, bedside chair)? 2  -RL     Climbing 3-5 steps with a railing? 1  -RL     To walk in hospital room? 2  -RL     AM-PAC 6 Clicks Score (PT) 11  -RL     Highest Level of Mobility Goal 4 --> Transfer to chair/commode  -RL               User Key  (r) = Recorded By, (t) = Taken By, (c) = Cosigned By      Initials Name Provider Type    RL Janee Blackmon, RN Registered Nurse                      OT Recommendation and Plan     Plan of Care Review  Outcome Evaluation: Patient able to complete bed to recliner transfer with minimal assist x 2 and rolling walker for support.  Patient will continue to benefit from inpatient rehab before returning home     Time Calculation:         Time Calculation- OT       Row Name 01/17/24 1254             Time Calculation- OT    OT Received On 01/17/24  -PG      OT Goal Re-Cert Due Date 01/19/24  -PG         Timed Charges    57929 - OT Therapeutic Exercise Minutes 13  -PG      17682 - OT Therapeutic Activity Minutes 10  -PG         Total Minutes    Timed Charges Total Minutes 23  -PG       Total Minutes 23  -PG                User Key  (r) = Recorded By, (t) = Taken By, (c) = Cosigned By       Initials Name Provider Type    PG Raudel Bartlett OT Occupational Therapist                  Therapy Charges for Today       Code Description Service Date Service Provider Modifiers Qty    19314906852 HC OT THER PROC EA 15 MIN 1/17/2024 Raudel Bartlett OT GO 1    [id number]  OT THERAPEUTIC ACT EA 15 MIN 1/17/2024 Raudel Bartlett OT GO 1                 Raudel Bartlett OT  1/17/2024

## 2024-01-18 ENCOUNTER — APPOINTMENT (OUTPATIENT)
Dept: MRI IMAGING | Facility: HOSPITAL | Age: 74
End: 2024-01-18
Payer: MEDICARE

## 2024-01-18 VITALS
RESPIRATION RATE: 18 BRPM | BODY MASS INDEX: 31.66 KG/M2 | HEIGHT: 67 IN | SYSTOLIC BLOOD PRESSURE: 97 MMHG | DIASTOLIC BLOOD PRESSURE: 52 MMHG | OXYGEN SATURATION: 97 % | WEIGHT: 201.72 LBS | TEMPERATURE: 97.7 F | HEART RATE: 93 BPM

## 2024-01-18 PROBLEM — E87.6 HYPOKALEMIA: Status: RESOLVED | Noted: 2024-01-08 | Resolved: 2024-01-18

## 2024-01-18 PROBLEM — G72.3 PERIODIC PARALYSIS: Status: RESOLVED | Noted: 2024-01-09 | Resolved: 2024-01-18

## 2024-01-18 PROBLEM — N83.201 RIGHT OVARIAN CYST: Chronic | Status: ACTIVE | Noted: 2024-01-18

## 2024-01-18 PROBLEM — M25.559 CHRONIC HIP PAIN: Chronic | Status: ACTIVE | Noted: 2024-01-18

## 2024-01-18 PROBLEM — M79.7 FIBROMYALGIA: Chronic | Status: ACTIVE | Noted: 2024-01-18

## 2024-01-18 PROBLEM — N39.0 ACUTE UTI (URINARY TRACT INFECTION): Status: RESOLVED | Noted: 2024-01-10 | Resolved: 2024-01-18

## 2024-01-18 PROBLEM — G89.29 CHRONIC HIP PAIN: Chronic | Status: ACTIVE | Noted: 2024-01-18

## 2024-01-18 PROBLEM — M51.36 DEGENERATIVE DISC DISEASE, LUMBAR: Chronic | Status: ACTIVE | Noted: 2024-01-18

## 2024-01-18 PROBLEM — M51.369 DEGENERATIVE DISC DISEASE, LUMBAR: Chronic | Status: ACTIVE | Noted: 2024-01-18

## 2024-01-18 LAB
ALBUMIN SERPL-MCNC: 2.4 G/DL (ref 3.5–5.2)
ALBUMIN/GLOB SERPL: 1 G/DL
ALP SERPL-CCNC: 88 U/L (ref 39–117)
ALT SERPL W P-5'-P-CCNC: 16 U/L (ref 1–33)
ANION GAP SERPL CALCULATED.3IONS-SCNC: 9 MMOL/L (ref 5–15)
AST SERPL-CCNC: 14 U/L (ref 1–32)
BASOPHILS # BLD AUTO: 0.04 10*3/MM3 (ref 0–0.2)
BASOPHILS NFR BLD AUTO: 0.7 % (ref 0–1.5)
BILIRUB SERPL-MCNC: 0.3 MG/DL (ref 0–1.2)
BUN SERPL-MCNC: 6 MG/DL (ref 8–23)
BUN/CREAT SERPL: 12.2 (ref 7–25)
CALCIUM SPEC-SCNC: 8.7 MG/DL (ref 8.6–10.5)
CHLORIDE SERPL-SCNC: 105 MMOL/L (ref 98–107)
CO2 SERPL-SCNC: 25 MMOL/L (ref 22–29)
CREAT SERPL-MCNC: 0.49 MG/DL (ref 0.57–1)
DEPRECATED RDW RBC AUTO: 76.5 FL (ref 37–54)
EGFRCR SERPLBLD CKD-EPI 2021: 99.7 ML/MIN/1.73
EOSINOPHIL # BLD AUTO: 0.26 10*3/MM3 (ref 0–0.4)
EOSINOPHIL NFR BLD AUTO: 4.4 % (ref 0.3–6.2)
ERYTHROCYTE [DISTWIDTH] IN BLOOD BY AUTOMATED COUNT: 22 % (ref 12.3–15.4)
GLOBULIN UR ELPH-MCNC: 2.5 GM/DL
GLUCOSE SERPL-MCNC: 95 MG/DL (ref 65–99)
HCT VFR BLD AUTO: 27.2 % (ref 34–46.6)
HGB BLD-MCNC: 8.9 G/DL (ref 12–15.9)
IMM GRANULOCYTES # BLD AUTO: 0.08 10*3/MM3 (ref 0–0.05)
IMM GRANULOCYTES NFR BLD AUTO: 1.3 % (ref 0–0.5)
LYMPHOCYTES # BLD AUTO: 1.32 10*3/MM3 (ref 0.7–3.1)
LYMPHOCYTES NFR BLD AUTO: 22.1 % (ref 19.6–45.3)
MCH RBC QN AUTO: 31.4 PG (ref 26.6–33)
MCHC RBC AUTO-ENTMCNC: 32.7 G/DL (ref 31.5–35.7)
MCV RBC AUTO: 96.1 FL (ref 79–97)
MONOCYTES # BLD AUTO: 0.8 10*3/MM3 (ref 0.1–0.9)
MONOCYTES NFR BLD AUTO: 13.4 % (ref 5–12)
NEUTROPHILS NFR BLD AUTO: 3.47 10*3/MM3 (ref 1.7–7)
NEUTROPHILS NFR BLD AUTO: 58.1 % (ref 42.7–76)
NRBC BLD AUTO-RTO: 0 /100 WBC (ref 0–0.2)
PLATELET # BLD AUTO: 285 10*3/MM3 (ref 140–450)
PMV BLD AUTO: 8.9 FL (ref 6–12)
POTASSIUM SERPL-SCNC: 3.9 MMOL/L (ref 3.5–5.2)
PROT SERPL-MCNC: 4.9 G/DL (ref 6–8.5)
RBC # BLD AUTO: 2.83 10*6/MM3 (ref 3.77–5.28)
SODIUM SERPL-SCNC: 139 MMOL/L (ref 136–145)
WBC NRBC COR # BLD AUTO: 5.97 10*3/MM3 (ref 3.4–10.8)

## 2024-01-18 PROCEDURE — 73721 MRI JNT OF LWR EXTRE W/O DYE: CPT

## 2024-01-18 PROCEDURE — 80053 COMPREHEN METABOLIC PANEL: CPT | Performed by: INTERNAL MEDICINE

## 2024-01-18 PROCEDURE — 25010000002 ENOXAPARIN PER 10 MG: Performed by: INTERNAL MEDICINE

## 2024-01-18 PROCEDURE — 85025 COMPLETE CBC W/AUTO DIFF WBC: CPT | Performed by: INTERNAL MEDICINE

## 2024-01-18 RX ORDER — FAMOTIDINE 20 MG/1
20 TABLET, FILM COATED ORAL DAILY
Qty: 30 TABLET | Refills: 0 | Status: SHIPPED | OUTPATIENT
Start: 2024-01-19 | End: 2024-02-18

## 2024-01-18 RX ORDER — HYDROCODONE BITARTRATE AND ACETAMINOPHEN 5; 325 MG/1; MG/1
1 TABLET ORAL EVERY 8 HOURS PRN
Qty: 9 TABLET | Refills: 0 | Status: SHIPPED | OUTPATIENT
Start: 2024-01-18 | End: 2024-01-21

## 2024-01-18 RX ORDER — POTASSIUM CHLORIDE 750 MG/1
10 TABLET, EXTENDED RELEASE ORAL DAILY
Qty: 15 TABLET | Refills: 0 | Status: SHIPPED | OUTPATIENT
Start: 2024-01-18 | End: 2024-02-02

## 2024-01-18 RX ORDER — FERROUS SULFATE 325(65) MG
325 TABLET ORAL 2 TIMES DAILY WITH MEALS
Qty: 60 TABLET | Refills: 0 | Status: SHIPPED | OUTPATIENT
Start: 2024-01-18 | End: 2024-02-17

## 2024-01-18 RX ADMIN — FERROUS SULFATE TAB 325 MG (65 MG ELEMENTAL FE) 325 MG: 325 (65 FE) TAB at 17:56

## 2024-01-18 RX ADMIN — ACETAMINOPHEN 650 MG: 325 TABLET ORAL at 20:50

## 2024-01-18 RX ADMIN — HYDROCODONE BITARTRATE AND ACETAMINOPHEN 1 TABLET: 5; 325 TABLET ORAL at 17:56

## 2024-01-18 RX ADMIN — MONTELUKAST 10 MG: 10 TABLET, FILM COATED ORAL at 20:50

## 2024-01-18 RX ADMIN — ATORVASTATIN CALCIUM 20 MG: 20 TABLET, FILM COATED ORAL at 20:50

## 2024-01-18 RX ADMIN — Medication 10 ML: at 09:00

## 2024-01-18 RX ADMIN — SPIRONOLACTONE 25 MG: 25 TABLET ORAL at 09:00

## 2024-01-18 RX ADMIN — HYDROCODONE BITARTRATE AND ACETAMINOPHEN 1 TABLET: 5; 325 TABLET ORAL at 09:05

## 2024-01-18 RX ADMIN — AMITRIPTYLINE HYDROCHLORIDE 50 MG: 50 TABLET, FILM COATED ORAL at 21:03

## 2024-01-18 RX ADMIN — METHOCARBAMOL 500 MG: 500 TABLET ORAL at 20:49

## 2024-01-18 RX ADMIN — ENOXAPARIN SODIUM 40 MG: 100 INJECTION SUBCUTANEOUS at 09:00

## 2024-01-18 RX ADMIN — LEVOTHYROXINE SODIUM 75 MCG: 75 TABLET ORAL at 06:17

## 2024-01-18 RX ADMIN — METHOCARBAMOL 500 MG: 500 TABLET ORAL at 09:00

## 2024-01-18 RX ADMIN — POTASSIUM CHLORIDE 10 MEQ: 10 CAPSULE, COATED, EXTENDED RELEASE ORAL at 09:00

## 2024-01-18 RX ADMIN — FAMOTIDINE 20 MG: 20 TABLET ORAL at 09:00

## 2024-01-18 RX ADMIN — Medication 1 MG: at 09:00

## 2024-01-18 RX ADMIN — FERROUS SULFATE TAB 325 MG (65 MG ELEMENTAL FE) 325 MG: 325 (65 FE) TAB at 09:00

## 2024-01-18 NOTE — DISCHARGE SUMMARY
[facility]         DISCHARGE SUMMARY    Patient Name: Lona Rodríguez  : 1950  MRN: [id number]    Date of Admission: 2024  Date of Discharge: 2024  Primary Care Physician: Christopher Thao MD    Consults       Date and Time Order Name Status Description    2024  9:29 PM Internal Medicine (on-call MD unless specified)              Presenting Problem:   Hypokalemia [E87.6]  Acute hypokalemia [E87.6]  Hypokalemic periodic paralysis [G72.3]  Elevated LFTs [R79.89]  Acute UTI (urinary tract infection) [N39.0]    Active and Resolved Hospital Problems:  Active Hospital Problems    Diagnosis POA   • Fibromyalgia [M79.7] Yes   • Degenerative disc disease, lumbar [M51.36] Yes   • Chronic hip pain [M25.559, G89.29] Yes   • Right ovarian cyst [N83.201] Yes   • Pressure ulcer, stage 2 [L89.92] Yes      Resolved Hospital Problems    Diagnosis POA   • Acute UTI (urinary tract infection) [N39.0] Yes   • Periodic paralysis [G72.3] Yes   • Hypokalemia [E87.6] Yes         Hospital Course     Hospital Course:  Lona Rodríguez is a 73 y.o. female admitted to hospital for weakness and cramps.  He also reported inability to walk at home and move.  But patient is bedridden for several months.  Workup in the ER revealed severe hypokalemia.  Patient admitted with hypokalemia and.  It paralyzes.  She was given replacement of potassium, her potassium and magnesium was replaced and she improved with this measures.  After improvement she started complaining about her back pain she also reports that paramedics dropped her and her hip is hurting.  Patient underwent physical therapy.  Patient was not cooperative was not able to do any kind of activity and not able to stand much.  Patient requested acute rehab which was denied by insurance.  Patient's MRI of the lumbar spine showed DJD changes no acute findings were noted.    Patient also had UTI on presentation which was treated with antibiotics and  resolved.  Patient has chronic pain issues and pain syndrome with fibromyalgia.     Patient also complains of hip pain, she claims that it got worse after she fell and dropped by paramedics.  Patient MRI did not show anything acute on the hip pain has chronic changes.  Her MRI did show possible ovarian cyst versus mass on the right side.  That needs to be followed as an outpatient.    Patient will be discharged to nursing home for continued therapy.            DISCHARGE Follow Up Recommendations for labs and diagnostics:     Discharged to nursing home  Follow-up with PCP post discharge from rehab/nursing home.  Outpatient workup for ovarian cyst versus mass    Monitor kidney functions and potassium levels    Day of Discharge     Vital Signs:  Temp:  [97.7 °F (36.5 °C)-98.4 °F (36.9 °C)] 97.7 °F (36.5 °C)  Heart Rate:  [] 94  Resp:  [16-18] 18  BP: ()/(50-80) 121/80    Physical Exam:    Elderly female, obese not in acute distress.  Heart regular.  Lungs clear.  Abdomen obese and soft nontender.  Tenderness in the hips bilaterally and lumbar spine, range of movement of hip and knee and other joints are painful and limited.  Neuro patient is awake alert and oriented      Pertinent  and/or Most Recent Results     LAB RESULTS:      Lab 01/18/24  0532 01/16/24  0502 01/14/24  0608 01/13/24  0425   WBC 5.97 6.25 6.27 6.95   HEMOGLOBIN 8.9* 8.5* 8.6* 9.2*   HEMATOCRIT 27.2* 25.2* 27.4* 27.6*   PLATELETS 285 257 248 290   NEUTROS ABS 3.47 3.77 3.62 3.89   IMMATURE GRANS (ABS) 0.08* 0.04 0.08* 0.10*   LYMPHS ABS 1.32 1.21 1.28 1.56   MONOS ABS 0.80 1.00* 0.89 0.98*   EOS ABS 0.26 0.20 0.34 0.35   MCV 96.1 94.0 98.2* 92.9   LDH  --   --  272*  --          Lab 01/18/24  0532 01/16/24  0502 01/14/24  0608 01/13/24  0425 01/12/24  0434   SODIUM 139 137 137 134* 135*   POTASSIUM 3.9 3.8 4.3 4.3 4.4   CHLORIDE 105 106 106 102 100   CO2 25.0 22.7 23.5 26.1 28.3   ANION GAP 9.0 8.3 7.5 5.9 6.7   BUN 6* 7* 7* 8 9    CREATININE 0.49* 0.55* 0.54* 0.54* 0.60   EGFR 99.7 96.9 97.4 97.4 94.9   GLUCOSE 95 90 92 109* 110*   CALCIUM 8.7 8.3* 8.1* 8.3* 8.1*         Lab 01/18/24  0532 01/16/24  0502 01/14/24  0608 01/13/24  0425   TOTAL PROTEIN 4.9* 4.6* 4.1* 4.6*   ALBUMIN 2.4* 2.1* 2.0* 2.3*   GLOBULIN 2.5 2.5 2.1 2.3   ALT (SGPT) 16 18 21 28   AST (SGOT) 14 17 20 26   BILIRUBIN 0.3 0.3 0.3 0.4   ALK PHOS 88 98 111 143*                 Lab 01/14/24  0940 01/14/24  0608   IRON  --  54   IRON SATURATION (TSAT)  --  36   TIBC  --  152*   TRANSFERRIN  --  102*   FERRITIN  --  317.40*   FOLATE 4.18*  --    VITAMIN B 12 985*  --          Brief Urine Lab Results  (Last result in the past 365 days)        Color   Clarity   Blood   Leuk Est   Nitrite   Protein   CREAT   Urine HCG        01/08/24 1917 Dark Yellow   Clear   Negative   Moderate (2+)   Negative   Negative                 Microbiology Results (last 10 days)       Procedure Component Value - Date/Time    Blood Culture - Blood, Chest, Left [[id number]]  (Normal) Collected: 01/08/24 2229    Lab Status: Final result Specimen: Blood from Chest, Left Updated: 01/13/24 2300     Blood Culture No growth at 5 days    Blood Culture - Blood, Chest, Left [[id number]]  (Normal) Collected: 01/08/24 2229    Lab Status: Final result Specimen: Blood from Chest, Left Updated: 01/13/24 2300     Blood Culture No growth at 5 days    Urine Culture - Urine, Urine, Clean Catch [[id number]] Collected: 01/08/24 1917    Lab Status: Final result Specimen: Urine, Clean Catch Updated: 01/09/24 1214     Urine Culture >100,000 CFU/mL Mixed Darlene Isolated    Narrative:      Specimen contains mixed organisms of questionable pathogenicity suggestive of contamination. If symptoms persist, suggest recollection.  Colonization of the urinary tract without infection is common. Treatment is discouraged unless the patient is symptomatic, pregnant, or undergoing an invasive urologic procedure.            PROCEDURES:    MRI Hip  Left Without Contrast    Result Date: 1/18/2024  Impression:   1. No acute fractures or contusions. 2. Remote healed left superior inferior pubic rami fractures. 3. Moderate left and mild right hip arthritis. 4. Nonspecific edema in the left iliacus and psoas muscles.  Minimal edema in the right iliopsoas muscle. 5. Moderate atrophy gluteal muscles. 6. Enlarged right ovary with fluid signal intensity probable cysts.  Gynecological consultation and follow-up recommended.     MILAGROS JACINTO MD       Electronically Signed and Approved By: MILAGROS JACINTO MD on 1/18/2024 at 13:23             MRI Lumbar Spine Without Contrast    Result Date: 1/17/2024  Impression:   1. No acute abnormalities are observed. 2. Mild-to-moderate multilevel discogenic degenerative changes throughout lumbar spine. These findings contribute to central canal narrowing and neural foraminal narrowing throughout the lumbar spine. 3. Moderate hypertrophic degenerative posterior facet changes throughout the lumbar spine. These findings contribute to bilateral neural foraminal narrowing at multiple levels.  These changes are most pronounced at the L5/S1 level.       RADHA GRACIA MD       Electronically Signed and Approved By: RADHA GRACIA MD on 1/17/2024 at 12:16             XR Spine Lumbar 2 or 3 View    Result Date: 1/16/2024  Impression:  Degenerative changes of the lumbar spine without definite acute fracture.     JACE BECERRIL MD       Electronically Signed and Approved By: JACE BECERRIL MD on 1/16/2024 at 13:07             XR Pelvis 1 or 2 View    Result Date: 1/16/2024  Impression:  No evidence of acute fracture, with chronic left pubic rami deformity.  Note that if the patient is unable to bear weight or clinical suspicion for fracture remains high, MRI or CT would be more sensitive for further evaluation.      JACE BECERRIL MD       Electronically Signed and Approved By: JACE BECERRIL MD on 1/16/2024 at 13:04             CT Abdomen Pelvis Without  Contrast    Result Date: 1/8/2024  Impression:   1. Fluid-filled loops and scattered air-fluid levels of the small bowel suggest a nonspecific enteritis.  No evidence of obstruction 2. Diffuse hepatic steatosis please correlate with liver function test 3. Cystic lesion right ovary.  Findings are similar given differences in technique to the comparison.  Nonemergent pelvic ultrasound recommended to evaluate if not already performed.  4. Hiatal hernia 5. Other findings as above     LUC JENSEN MD       Electronically Signed and Approved By: LUC JENSEN MD on 1/08/2024 at 21:09             XR Chest 1 View    Result Date: 1/8/2024  Impression:   1. Cardiomegaly. 2. No radiographic findings of acute pulmonary abnormality.       BLANE JOHNSON MD       Electronically Signed and Approved By: BLANE JOHNSON MD on 1/08/2024 at 15:23              Results for orders placed during the hospital encounter of 09/13/23    Duplex venous lower extremity bilateral CAR    Interpretation Summary  •  Normal bilateral lower extremity venous duplex scan.      Results for orders placed during the hospital encounter of 09/13/23    Duplex venous lower extremity bilateral CAR    Interpretation Summary  •  Normal bilateral lower extremity venous duplex scan.          Labs Pending at Discharge:        Discharge Details        Discharge Medications        New Medications        Instructions Start Date   famotidine 20 MG tablet  Commonly known as: PEPCID   20 mg, Oral, Daily   Start Date: January 19, 2024     ferrous sulfate 325 (65 FE) MG tablet   325 mg, Oral, 2 Times Daily With Meals      HYDROcodone-acetaminophen 5-325 MG per tablet  Commonly known as: NORCO   1 tablet, Oral, Every 8 Hours PRN      potassium chloride 10 MEQ CR tablet  Commonly known as: K-DUR,KLOR-CON   10 mEq, Oral, Daily             Continue These Medications        Instructions Start Date   amitriptyline 50 MG tablet  Commonly known as: ELAVIL   50 mg, Oral,  Nightly      atorvastatin 20 MG tablet  Commonly known as: LIPITOR   20 mg, Oral, Daily      levothyroxine 75 MCG tablet  Commonly known as: SYNTHROID, LEVOTHROID   75 mcg, Oral, Daily      montelukast 10 MG tablet  Commonly known as: SINGULAIR   10 mg, Oral, Nightly      spironolactone 25 MG tablet  Commonly known as: ALDACTONE   25 mg, Oral, Daily      Tylenol 325 MG tablet  Generic drug: acetaminophen   650 mg, Oral, Every 6 Hours PRN      vitamin D 1.25 MG (82866 UT) capsule capsule  Commonly known as: ERGOCALCIFEROL   50,000 Units, Oral, 2 Times Weekly, Monday and Wednesday               Allergies   Allergen Reactions   • Morphine Anaphylaxis         Discharge Disposition:    Rehab Facility or Unit (DC - External)    Diet:    Heart healthy diet    Discharge Activity:     Activity Instructions       Activity as Tolerated              Future Appointments   Date Time Provider Department Center   5/14/2024  1:00 PM [facility] MAR[facility]   5/14/2024  1:30 PM [facility] US 2  [facility]   6/13/2024 11:00 AM Lara Terry MD [facility]       Additional Instructions for the Follow-ups that You Need to Schedule       Discharge Follow-up with PCP   As directed       Currently Documented PCP:    Christopher Thao MD    PCP Phone Number:    [phone]     Follow Up Details: Post discharge from nursing home                Time spent on Discharge including face to face service: 50 minutes.            I have dictated this note utilizing Dragon Dictation.             Please note that portions of this note were completed with a voice recognition program.             Part of this note may be an electronic transcription/translation of spoken language to printed text         using the Dragon Dictation System.       Electronically signed by Luiz Diana MD, 01/18/24, 9:52 AM EST.

## 2024-01-18 NOTE — PLAN OF CARE
Goal Outcome Evaluation:      PT RESTING QUIETLY IN BED. REPORTS OF PAIN THROUGHOUT THIS SHIFT. PRN MEDS GIVEN AS ORDERED. PT EXPRESSED CONCERNS THAT PRN PAIN MEDS WEREN'T AVAILABLE AS FREQUENTLY AS SHE WAS NEEDING. PER DAY SHIFT RN, THIS CONCERN WAS ADDRESSED EARLIER WITH MD. CALL LIGHT IN REACH.

## 2024-01-18 NOTE — PLAN OF CARE
Goal Outcome Evaluation:                                              Problem: Adult Inpatient Plan of Care  Goal: Plan of Care Review  Outcome: Met  Goal: Patient-Specific Goal (Individualized)  Outcome: Met  Goal: Absence of Hospital-Acquired Illness or Injury  Outcome: Met  Goal: Optimal Comfort and Wellbeing  Outcome: Met  Intervention: Monitor Pain and Promote Comfort  Recent Flowsheet Documentation  Taken 1/18/2024 0905 by Gia Schaffer RN  Pain Management Interventions: see MAR  Intervention: Provide Person-Centered Care  Recent Flowsheet Documentation  Taken 1/18/2024 0905 by Gia Schaffer RN  Trust Relationship/Rapport:   care explained   choices provided   emotional support provided   empathic listening provided   questions answered   questions encouraged   reassurance provided   thoughts/feelings acknowledged  Goal: Readiness for Transition of Care  Outcome: Met     Problem: Hypertension Comorbidity  Goal: Blood Pressure in Desired Range  Outcome: Met     Problem: Pain Chronic (Persistent) (Comorbidity Management)  Goal: Acceptable Pain Control and Functional Ability  Outcome: Met  Intervention: Develop Pain Management Plan  Recent Flowsheet Documentation  Taken 1/18/2024 0905 by Gia Schaffer RN  Pain Management Interventions: see MAR  Intervention: Optimize Psychosocial Wellbeing  Recent Flowsheet Documentation  Taken 1/18/2024 0905 by Gia Schaffer RN  Diversional Activities:   smartphone   television  Family/Support System Care: support provided     Problem: Skin Injury Risk Increased  Goal: Skin Health and Integrity  Outcome: Met     Problem: Fall Injury Risk  Goal: Absence of Fall and Fall-Related Injury  Outcome: Met

## 2024-01-18 NOTE — PROGRESS NOTES
[facility]     Progress Note    Patient Name: Lona Rodríguez  : 1950  MRN: [id number]  Primary Care Physician:  Christopher Thao MD  Date of admission: 2024      Subjective   Brief summary.  Patient admitted with severe hypokalemia and subsequently had muscle aches and pain, wants to go to rehab      HPI:  Continues to complain about hip pain and lower back pain unable to move.  RN reports patient cannot move in the bed when we try to rotate it hurts a lot.  Left hip is the worst.  Low back is the worst patient worried about disc herniation      Review of Systems     Chronic aches and pains, no chest pain no shortness of breath.  Hip pain low back pain.  No bowel bladder incontinence      Objective     Vitals:   Temp:  [97.7 °F (36.5 °C)-98.5 °F (36.9 °C)] 98.4 °F (36.9 °C)  Heart Rate:  [] 102  Resp:  [16-18] 18  BP: ()/(44-66) 95/51    Physical Exam :     Elderly female not in acute distress.    Awake alert and oriented.    Heart regular.  Lungs clear.  Abdomen soft obese.  Nontender.  Neuro awake alert and oriented.  Extremities trace of edema, tenderness on the left hip range of movement limited, chronically bedridden difficult to assess      Result Review:  I have personally reviewed the results from the time of this admission to 2024 19:13 EST and agree with these findings:  []  Laboratory  []  Microbiology  []  Radiology  []  EKG/Telemetry   []  Cardiology/Vascular   []  Pathology  []  Old records  []  Other:      Assessment / Plan       Active Hospital Problems:  Active Hospital Problems    Diagnosis     Pressure ulcer, stage 2     Acute UTI (urinary tract infection)     Periodic paralysis     Hypokalemia        Plan:   Continues to have pain.  Will proceed with MRI of the L-spine, left hip.  Continue pain meds patient denied for acute rehab.  Will evaluate for subacute rehab    DVT prophylaxis:  Medical DVT prophylaxis orders are present.    CODE STATUS:   Code Status  (Patient has no pulse and is not breathing): CPR (Attempt to Resuscitate)  Medical Interventions (Patient has pulse or is breathing): Full Support                    Electronically signed by Luiz Diana MD, 01/17/24, 7:14 PM EST.

## 2024-01-29 ENCOUNTER — TELEPHONE (OUTPATIENT)
Dept: GASTROENTEROLOGY | Facility: CLINIC | Age: 74
End: 2024-01-29
Payer: MEDICARE

## 2024-01-29 NOTE — TELEPHONE ENCOUNTER
Hub staff attempted to follow warm transfer process and was unsuccessful     Caller: Lona Rodríguez    Relationship to patient: Self    Best call back number: [phone]     Patient is needing: PATIENT IS NEEDING TO SCHEDULE AN ENDOSCOPY. PLEASE CALL PATIENT.

## 2024-02-02 NOTE — TELEPHONE ENCOUNTER
S/w patient.     She is now rescheduled to 3.13.24 @ 11:00 am.     I will resend Cardiac Clearance to Dr. Greene and add pt to our clearance book.     Patient asked that I send appt reminder to this alternate address:    [address] [place] [address]    I will resend clearance next week.     Postponing Patient Call until 2.5.24

## 2024-02-21 ENCOUNTER — TRANSCRIBE ORDERS (OUTPATIENT)
Dept: ADMINISTRATIVE | Facility: HOSPITAL | Age: 74
End: 2024-02-21
Payer: MEDICARE

## 2024-02-21 DIAGNOSIS — R20.9 SENSATION OF COLD IN LEG: ICD-10-CM

## 2024-02-21 DIAGNOSIS — I70.213 ATHEROSCLEROSIS OF NATIVE ARTERY OF BOTH LOWER EXTREMITIES WITH INTERMITTENT CLAUDICATION: Primary | ICD-10-CM

## 2024-02-21 DIAGNOSIS — R20.9 BILATERAL COLD FEET: ICD-10-CM

## 2024-03-06 ENCOUNTER — HOSPITAL ENCOUNTER (OUTPATIENT)
Dept: CARDIOLOGY | Facility: HOSPITAL | Age: 74
Discharge: HOME | End: 2024-03-06
Admitting: SPECIALIST
Payer: MEDICARE

## 2024-03-06 DIAGNOSIS — R20.9 SENSATION OF COLD IN LEG: ICD-10-CM

## 2024-03-06 DIAGNOSIS — I70.213 ATHEROSCLEROSIS OF NATIVE ARTERY OF BOTH LOWER EXTREMITIES WITH INTERMITTENT CLAUDICATION: ICD-10-CM

## 2024-03-06 DIAGNOSIS — R20.9 BILATERAL COLD FEET: ICD-10-CM

## 2024-03-06 LAB
BH CV LOWER ARTERIAL LEFT ABI RATIO: 0.85
BH CV LOWER ARTERIAL LEFT DORSALIS PEDIS SYS MAX: 129
BH CV LOWER ARTERIAL LEFT GREAT TOE SYS MAX: 91
BH CV LOWER ARTERIAL LEFT POST TIBIAL SYS MAX: 126
BH CV LOWER ARTERIAL LEFT TBI RATIO: 0.6
BH CV LOWER ARTERIAL RIGHT ABI RATIO: 0.97
BH CV LOWER ARTERIAL RIGHT DORSALIS PEDIS SYS MAX: 140
BH CV LOWER ARTERIAL RIGHT GREAT TOE SYS MAX: 112
BH CV LOWER ARTERIAL RIGHT POST TIBIAL SYS MAX: 147
BH CV LOWER ARTERIAL RIGHT TBI RATIO: 0.74
UPPER ARTERIAL LEFT ARM BRACHIAL SYS MAX: 141
UPPER ARTERIAL RIGHT ARM BRACHIAL SYS MAX: 152

## 2024-03-06 PROCEDURE — 93923 UPR/LXTR ART STDY 3+ LVLS: CPT

## 2024-03-06 PROCEDURE — 93923 UPR/LXTR ART STDY 3+ LVLS: CPT | Performed by: SURGERY

## 2024-03-12 ENCOUNTER — TRANSCRIBE ORDERS (OUTPATIENT)
Dept: ADMINISTRATIVE | Facility: HOSPITAL | Age: 74
End: 2024-03-12
Payer: MEDICARE

## 2024-03-12 ENCOUNTER — TELEPHONE (OUTPATIENT)
Dept: GASTROENTEROLOGY | Facility: CLINIC | Age: 74
End: 2024-03-12
Payer: MEDICARE

## 2024-03-12 DIAGNOSIS — M51.16 LUMBAR DISC HERNIATION WITH RADICULOPATHY: Primary | ICD-10-CM

## 2024-03-12 NOTE — TELEPHONE ENCOUNTER
Lona Rodríguez  1950    Patient requested to Reschedule their EGD. I have offered to reschedule this patient and patient has agreed. Patient has been rescheduled to 5.7.24 date.    Reason for cancelling/rescheduling: pt is sick and had to reschedule     This procedure was ordered by Les Henderson MD for an important reason. We want to inform you that there are risks associated with not proceeding with the procedure at this time such as a delay in diagnosis, risk of incurable disease, or cancer.    Patient plans to call us back to reschedule: No    Updated clearance needed?: Yes    If yes, clearance request has been submitted to: [facility]    Is the patient currently on any injectable medications for weight loss or diabetes? No        Patient verbalized understanding for all of the above information.

## 2024-03-26 ENCOUNTER — HOSPITAL ENCOUNTER (OUTPATIENT)
Facility: HOSPITAL | Age: 74
Discharge: HOME | End: 2024-03-26
Admitting: INTERNAL MEDICINE
Payer: MEDICARE

## 2024-03-26 DIAGNOSIS — M51.16 LUMBAR DISC HERNIATION WITH RADICULOPATHY: ICD-10-CM

## 2024-03-26 PROCEDURE — 95911 NRV CNDJ TEST 9-10 STUDIES: CPT

## 2024-03-26 PROCEDURE — 95886 MUSC TEST DONE W/N TEST COMP: CPT

## 2024-05-02 NOTE — PRE-PROCEDURE INSTRUCTIONS
"Instructed on date and arrival time of 1100. Come to entrance \"C\".  Must have  over age 18 to drive home.  May have two visitors; however, children under 12 must stay in waiting room.  Discussed diet/NPO.  May take medications as usual except for blood thinners, diabetic medications, or weight loss medications.  Verbalized understanding of instructions given.  Instructed to call for questions or concerns.  Cardiac clearance noted in chart.  "

## 2024-05-06 ENCOUNTER — ANESTHESIA EVENT (OUTPATIENT)
Dept: GASTROENTEROLOGY | Facility: HOSPITAL | Age: 74
End: 2024-05-06
Payer: MEDICARE

## 2024-05-07 ENCOUNTER — ANESTHESIA (OUTPATIENT)
Dept: GASTROENTEROLOGY | Facility: HOSPITAL | Age: 74
End: 2024-05-07
Payer: MEDICARE

## 2024-05-07 ENCOUNTER — HOSPITAL ENCOUNTER (OUTPATIENT)
Facility: HOSPITAL | Age: 74
Setting detail: HOSPITAL OUTPATIENT SURGERY
Discharge: HOME | End: 2024-05-07
Attending: INTERNAL MEDICINE | Admitting: INTERNAL MEDICINE
Payer: MEDICARE

## 2024-05-07 VITALS
SYSTOLIC BLOOD PRESSURE: 121 MMHG | HEART RATE: 78 BPM | WEIGHT: 185.19 LBS | DIASTOLIC BLOOD PRESSURE: 75 MMHG | RESPIRATION RATE: 18 BRPM | OXYGEN SATURATION: 98 % | TEMPERATURE: 97.3 F | BODY MASS INDEX: 29.44 KG/M2

## 2024-05-07 DIAGNOSIS — K20.90 ESOPHAGITIS DETERMINED BY ENDOSCOPY: ICD-10-CM

## 2024-05-07 PROCEDURE — 43239 EGD BIOPSY SINGLE/MULTIPLE: CPT | Performed by: INTERNAL MEDICINE

## 2024-05-07 PROCEDURE — 88305 TISSUE EXAM BY PATHOLOGIST: CPT | Performed by: INTERNAL MEDICINE

## 2024-05-07 PROCEDURE — 25010000002 PROPOFOL 10 MG/ML EMULSION: Performed by: NURSE ANESTHETIST, CERTIFIED REGISTERED

## 2024-05-07 PROCEDURE — 25810000003 LACTATED RINGERS PER 1000 ML

## 2024-05-07 RX ORDER — SODIUM CHLORIDE, SODIUM LACTATE, POTASSIUM CHLORIDE, CALCIUM CHLORIDE 600; 310; 30; 20 MG/100ML; MG/100ML; MG/100ML; MG/100ML
30 INJECTION, SOLUTION INTRAVENOUS CONTINUOUS
Status: DISCONTINUED | OUTPATIENT
Start: 2024-05-07 | End: 2024-05-07 | Stop reason: HOSPADM

## 2024-05-07 RX ORDER — PROPOFOL 10 MG/ML
VIAL (ML) INTRAVENOUS AS NEEDED
Status: DISCONTINUED | OUTPATIENT
Start: 2024-05-07 | End: 2024-05-07 | Stop reason: SURG

## 2024-05-07 RX ORDER — HYDROCODONE BITARTRATE AND ACETAMINOPHEN 7.5; 325 MG/1; MG/1
1 TABLET ORAL EVERY 4 HOURS PRN
COMMUNITY

## 2024-05-07 RX ORDER — PANTOPRAZOLE SODIUM 40 MG/1
40 TABLET, DELAYED RELEASE ORAL DAILY
COMMUNITY

## 2024-05-07 RX ORDER — DULOXETIN HYDROCHLORIDE 60 MG/1
60 CAPSULE, DELAYED RELEASE ORAL DAILY
COMMUNITY

## 2024-05-07 RX ORDER — LIDOCAINE HYDROCHLORIDE 20 MG/ML
INJECTION, SOLUTION EPIDURAL; INFILTRATION; INTRACAUDAL; PERINEURAL AS NEEDED
Status: DISCONTINUED | OUTPATIENT
Start: 2024-05-07 | End: 2024-05-07 | Stop reason: SURG

## 2024-05-07 RX ADMIN — LIDOCAINE HYDROCHLORIDE 100 MG: 20 INJECTION, SOLUTION EPIDURAL; INFILTRATION; INTRACAUDAL; PERINEURAL at 14:08

## 2024-05-07 RX ADMIN — PROPOFOL 250 MCG/KG/MIN: 10 INJECTION, EMULSION INTRAVENOUS at 14:08

## 2024-05-07 RX ADMIN — SODIUM CHLORIDE, POTASSIUM CHLORIDE, SODIUM LACTATE AND CALCIUM CHLORIDE 30 ML/HR: 600; 310; 30; 20 INJECTION, SOLUTION INTRAVENOUS at 11:52

## 2024-05-07 RX ADMIN — PROPOFOL 100 MG: 10 INJECTION, EMULSION INTRAVENOUS at 14:08

## 2024-05-07 NOTE — H&P
Post-Anesthesia Evaluation and Assessment    Patient: Isidro Frye MRN: [id number]  SSN: [id number]    YOB: 1991  Age: 22 y.o. Sex: female       Cardiovascular Function/Vital Signs  Visit Vitals    /58 (BP Patient Position: At rest)    Pulse 78    Temp 36.8 °C (98.2 °F)    Resp 16    Ht 5' 2\" (1.575 m)    Wt 75.3 kg (166 lb)    SpO2 98%    Breastfeeding Unknown    BMI 30.36 kg/m2       Patient is status post epidural anesthesia for * No procedures listed *. Nausea/Vomiting: None    Postoperative hydration reviewed and adequate. Pain:  Pain Scale 1: Numeric (0 - 10) (08/14/17 0415)  Pain Intensity 1: 3 (08/14/17 0415)   Managed    Neurological Status: At baseline    Mental Status and Level of Consciousness: Arousable    Pulmonary Status:   O2 Device: Room air (08/14/17 0415)   Adequate oxygenation and airway patent    Complications related to anesthesia: None    Post-anesthesia assessment completed. No concerns. The patient was satisfied with her labor epidural and denies any complications. Her lower extremities have returned to baseline neurologically.       Signed By: Kelsey Mendez MD     August 14, 2017 Pre Procedure History & Physical    Chief Complaint:   History of esophageal ulcers    Subjective     HPI:   History of esophageal ulcer and grade D esophagitis    Past Medical History:   Past Medical History:   Diagnosis Date    AMD (age related macular degeneration)     Anemia Following surgery    Arthritis     Asthma     Breast cancer Left breast.    Breast mass 2 lumpectomies 1 cancerous 1 benign    CHF (congestive heart failure) Just getting evaluation    Colon polyp Removed during colonos    COPD (chronic obstructive pulmonary disease)     Coronary artery disease Chf    Deep vein thrombosis 1979    Fibrocystic breast Left breast    Fibromyalgia 01/18/2024    History of transfusion Aug 2019 2 units    Hyperlipidemia     Hypertension     Nausea and vomiting 11/29/2023    Obesity     Polymyalgia arteritica     Postoperative wound infection Lymph node resection site,5th toe left foot infection after neuroma surgery. 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    Sciatica        Past Surgical History:  Past Surgical History:   Procedure Laterality Date    BREAST BIOPSY  Left    BREAST SURGERY  Lumpectomies left breast    ENDOSCOPY N/A 12/2/2023    Procedure: ESOPHAGOGASTRODUODENOSCOPY WITH BIOPSIES;  Surgeon: Maximino Aceves MD;  Location: [facility] ENDOSCOPY;  Service: Gastroenterology;  Laterality: N/A;  ESOPHAGITIS, GASTRITIS, HIATAL HERNIA    EYE SURGERY  Lens implants both eyes    REPLACEMENT TOTAL KNEE      TONSILLECTOMY         Family History:  Family History   Problem Relation Age of Onset    Arthritis Mother     Cancer Mother     Heart disease Mother     Arthritis Father     COPD Father     Arthritis Brother     Diabetes Brother     Learning disabilities Brother         Dyslexic    Mental illness Maternal Aunt         Schizophrenic       Social History:   reports that she has never smoked. She has never used smokeless tobacco. She reports that she does not drink alcohol and does not use drugs.    Medications:   Medications Prior  to Admission   Medication Sig Dispense Refill Last Dose    acetaminophen (Tylenol) 325 MG tablet Take 2 tablets by mouth Every 6 (Six) Hours As Needed for Mild Pain.   5/6/2024    atorvastatin (LIPITOR) 20 MG tablet Take 1 tablet by mouth Daily.   5/6/2024    DULoxetine (CYMBALTA) 60 MG capsule Take 1 capsule by mouth Daily.   5/6/2024    HYDROcodone-acetaminophen (NORCO) 7.5-325 MG per tablet Take 1 tablet by mouth Every 4 (Four) Hours As Needed for Moderate Pain.   5/7/2024    levothyroxine (SYNTHROID, LEVOTHROID) 75 MCG tablet Take 1 tablet by mouth Daily.   5/6/2024    montelukast (SINGULAIR) 10 MG tablet Take 1 tablet by mouth Every Night.   5/6/2024    pantoprazole (PROTONIX) 40 MG EC tablet Take 1 tablet by mouth Daily.   5/6/2024    spironolactone (ALDACTONE) 25 MG tablet Take 1 tablet by mouth Daily for 30 days. 30 tablet 0 5/6/2024    vitamin D (ERGOCALCIFEROL) 1.25 MG (02463 UT) capsule capsule Take 1 capsule by mouth 2 (Two) Times a Week. Monday and Wednesday 5/6/2024       Allergies:  Morphine        Objective     Blood pressure 124/58, pulse 80, temperature 97.6 °F (36.4 °C), temperature source Temporal, resp. rate 18, weight 84 kg (185 lb 3 oz), SpO2 98%.    Physical Exam   Constitutional: Pt is oriented to person, place, and time and well-developed, well-nourished, and in no distress.   Mouth/Throat: Oropharynx is clear and moist.   Neck: Normal range of motion.   Cardiovascular: Normal rate, regular rhythm and normal heart sounds.    Pulmonary/Chest: Effort normal and breath sounds normal.   Abdominal: Soft. Nontender  Skin: Skin is warm and dry.   Psychiatric: Mood, memory, affect and judgment normal.     Assessment & Plan     Diagnosis:  Esophageal ulcers    Anticipated Surgical Procedure:  EGD    The risks, benefits, and alternatives of this procedure have been discussed with the patient or the responsible party- the patient understands and agrees to proceed.

## 2024-05-09 LAB
CYTO UR: NORMAL
LAB AP CASE REPORT: NORMAL
LAB AP CLINICAL INFORMATION: NORMAL
PATH REPORT.FINAL DX SPEC: NORMAL
PATH REPORT.GROSS SPEC: NORMAL

## 2024-05-14 ENCOUNTER — APPOINTMENT (OUTPATIENT)
Dept: ULTRASOUND IMAGING | Facility: HOSPITAL | Age: 74
End: 2024-05-14
Payer: MEDICARE

## 2024-05-14 ENCOUNTER — HOSPITAL ENCOUNTER (OUTPATIENT)
Dept: MAMMOGRAPHY | Facility: HOSPITAL | Age: 74
End: 2024-05-14
Payer: MEDICARE

## 2024-06-03 ENCOUNTER — TELEPHONE (OUTPATIENT)
Dept: GENERAL RADIOLOGY | Facility: HOSPITAL | Age: 74
End: 2024-06-03
Payer: MEDICARE

## 2024-06-03 NOTE — TELEPHONE ENCOUNTER
Patient 6-month follow up mammography reminder letter was returned for reason not deliverable as addressed. Per the patient, she forgot the appointment on 5-30-24 and asked to be transferred to scheduling. The call was transferred as requested.

## 2024-06-18 DIAGNOSIS — R23.4 BREAST SKIN CHANGES: Primary | ICD-10-CM

## 2024-06-18 DIAGNOSIS — Z03.89 ENCOUNTER FOR OBSERVATION FOR OTHER SUSPECTED DISEASES AND CONDITIONS RULED OUT: ICD-10-CM

## 2024-06-20 ENCOUNTER — HOSPITAL ENCOUNTER (OUTPATIENT)
Dept: MAMMOGRAPHY | Facility: HOSPITAL | Age: 74
Discharge: HOME | End: 2024-06-20
Payer: MEDICARE

## 2024-06-20 ENCOUNTER — HOSPITAL ENCOUNTER (OUTPATIENT)
Dept: ULTRASOUND IMAGING | Facility: HOSPITAL | Age: 74
Discharge: HOME | End: 2024-06-20
Payer: MEDICARE

## 2024-06-20 ENCOUNTER — HOSPITAL ENCOUNTER (OUTPATIENT)
Dept: MAMMOGRAPHY | Facility: HOSPITAL | Age: 74
End: 2024-06-20
Payer: MEDICARE

## 2024-06-20 DIAGNOSIS — Z03.89 ENCOUNTER FOR OBSERVATION FOR OTHER SUSPECTED DISEASES AND CONDITIONS RULED OUT: ICD-10-CM

## 2024-06-20 DIAGNOSIS — R23.4 BREAST SKIN CHANGES: ICD-10-CM

## 2024-06-20 PROCEDURE — G0279 TOMOSYNTHESIS, MAMMO: HCPCS

## 2024-06-20 PROCEDURE — 77066 DX MAMMO INCL CAD BI: CPT

## 2024-06-21 RX ORDER — ONDANSETRON 4 MG/1
4 TABLET, FILM COATED ORAL EVERY 8 HOURS
COMMUNITY

## 2024-06-21 RX ORDER — FUROSEMIDE 40 MG/1
TABLET ORAL EVERY 12 HOURS SCHEDULED
COMMUNITY

## 2024-06-21 RX ORDER — METHOCARBAMOL 750 MG/1
TABLET, FILM COATED ORAL
COMMUNITY
Start: 2024-06-06

## 2024-06-21 RX ORDER — METHOCARBAMOL 500 MG/1
TABLET, FILM COATED ORAL EVERY 12 HOURS SCHEDULED
COMMUNITY

## 2024-06-21 RX ORDER — GABAPENTIN 300 MG/1
1 CAPSULE ORAL EVERY 8 HOURS SCHEDULED
COMMUNITY

## 2024-06-21 RX ORDER — OMEPRAZOLE 40 MG/1
CAPSULE, DELAYED RELEASE ORAL EVERY 24 HOURS
COMMUNITY

## 2024-06-21 RX ORDER — IPRATROPIUM BROMIDE 21 UG/1
SPRAY, METERED NASAL EVERY 8 HOURS SCHEDULED
COMMUNITY
Start: 2024-03-14

## 2024-06-21 RX ORDER — PREDNISONE 20 MG/1
TABLET ORAL
COMMUNITY
Start: 2024-04-11

## 2024-06-21 RX ORDER — AMITRIPTYLINE HYDROCHLORIDE 50 MG/1
TABLET, FILM COATED ORAL EVERY 24 HOURS
COMMUNITY

## 2024-06-21 RX ORDER — BUDESONIDE, GLYCOPYRROLATE, AND FORMOTEROL FUMARATE 160; 9; 4.8 UG/1; UG/1; UG/1
AEROSOL, METERED RESPIRATORY (INHALATION) EVERY 12 HOURS SCHEDULED
COMMUNITY

## 2024-06-21 RX ORDER — DULOXETIN HYDROCHLORIDE 30 MG/1
2 CAPSULE, DELAYED RELEASE ORAL DAILY
COMMUNITY
Start: 2024-05-31

## 2024-06-21 RX ORDER — BENZONATATE 100 MG/1
1 CAPSULE ORAL EVERY 8 HOURS SCHEDULED
COMMUNITY

## 2024-06-21 RX ORDER — POTASSIUM CHLORIDE 750 MG/1
TABLET, EXTENDED RELEASE ORAL EVERY 12 HOURS SCHEDULED
COMMUNITY

## 2024-06-21 RX ORDER — LACTULOSE 10 G/15ML
SOLUTION ORAL
COMMUNITY
Start: 2024-03-01

## 2024-06-24 ENCOUNTER — OFFICE VISIT (OUTPATIENT)
Dept: SURGERY | Facility: CLINIC | Age: 74
End: 2024-06-24
Payer: MEDICARE

## 2024-06-24 VITALS — BODY MASS INDEX: 31.39 KG/M2 | RESPIRATION RATE: 16 BRPM | WEIGHT: 200 LBS | HEIGHT: 67 IN

## 2024-06-24 DIAGNOSIS — R23.4 BREAST SKIN CHANGES: Primary | ICD-10-CM

## 2024-06-24 PROCEDURE — 1160F RVW MEDS BY RX/DR IN RCRD: CPT | Performed by: SURGERY

## 2024-06-24 PROCEDURE — 1159F MED LIST DOCD IN RCRD: CPT | Performed by: SURGERY

## 2024-06-24 PROCEDURE — 99212 OFFICE O/P EST SF 10 MIN: CPT | Performed by: SURGERY

## 2024-06-24 NOTE — PROGRESS NOTES
Chief Complaint: Follow-up    Subjective         History of Present Illness  Lona Rodríguez is a 74 y.o. female presents to [facility] to be seen for breast skin changes and enlargement of left breast.  She has a hx of breast cancer in this breast in 1996.  Her imaging is shown below:    Narrative & Impression   PROCEDURE:  MAMMO DIAGNOSTIC DIGITAL TOMOSYNTHESIS BILATERAL W CAD, 7/12/2023, 13:52  US BREAST LEFT LIMITED, 7/12/2023, 14:12     COMPARISON:  [facility], , DIG DIAG LEFT JASVIR W 3D JM, 1/25/2021, 15:05.     VIEWS:  DIAGNOSTIC VIEWS WERE OBTAINED UTILIZING 3D TOMOSYNTHESIS AND R2 CAD SOFTWARE     INDICATIONS:  73-year-old female with remote history of left breast cancer/lumpectomy 1996/1997.    She presents for diagnostic evaluation of intermittent left breast swelling.     FINDINGS:          Technologist note:  Patient with limited range of motion.  Images obtained best possible.  Portions   of the breast nonvisualized cannot be evaluated.     Right breast:  Partially visualized right port catheter.  There are benign-type calcifications   within the right breast.  There are no dominant masses, suspicious microcalcifications or areas of   architectural distortion in the right breast.     Left breast:  Visualized left breast demonstrates progressive coarse calcifications within the   lumpectomy bed compatible with evolving fat necrosis.  There are numerous secretory type   calcifications within the left breast.  There is diffuse trabecular thickening and increased global   asymmetry of the visualized left breast some of which is likely from chronic post treatment   changes.  Superimposed edema or diffuse infiltrative neoplastic disease can have a similar   appearance.  There is chronic diffuse skin thickening.     High-resolution focused left breast ultrasound at the 12 o'clock position 8 cm from the nipple,   0300 hours 5-8 cm from the nipple, 0800 hours 8  cm from the nipple, 0600 hours  5 cm from the   nipple and 0900 hours 8 cm from the nipple, reported areas of breast swelling, demonstrate diffuse   breast edema.  No discrete mass or areas of abnormal shadowing.  There is moderate in skin   thickening at the 1200 hours and 0300 hours positions.     IMPRESSION:  Right breast:  Benign mammogram.  Recommend routine right breast screening.     Left breast:  Trabecular thickening and diffuse edema of the left breast with localized skin   thickening pronounced at the  hours positions.  Etiologies for breast edema and trabecular   thickening include congestive heart failure/problems with cardiac pump function.  The patient   reports six-month history of gradual increasing Lasix dosage to treat peripheral edema which she   reports has improved.  She also reports a history of shortness of breath on exertion which has also   improved with Lasix administration.  She has been scheduled for cardiac consultation 08/2023.  Other etiologies for trabecular thickening included diffuse neoplastic disease.  At this time   suggest short interval four-month mammographic and sonographic follow-up.  Pending cardiac   consultation results, the more immediate skin punch biopsy in the area of edema and or breast MRI   can be performed given high risk.  All findings and recommendations discussed directly with the   patient at the time of exam.     RECOMMENDATION(S):               CLINICAL EVALUATION.       SHORT TERM FOLLOW-UP DIAGNOSTIC MAMMOGRAM LEFT BREAST IN 4 MONTHS.       SHORT TERM FOLLOW-UP ULTRASOUND LEFT BREAST IN 4 MONTHS.       BIRADS:               DIAGNOSTIC CATEGORY 3--PROBABLY BENIGN FINDING.       BREAST COMPOSITION:          Scattered areas fibroglandular density.          She was unable to get her MRI but was able to get her PET scan.      Narrative & Impression   PROCEDURE:  NM PET SKULL BASE TO MID THIGH     COMPARISON: None     INDICATIONS:  NEOPLASM OF LT  BREAST     TECHNIQUE:    After obtaining the patient's consent, F-18 FDG was administered intravenously.  PET/CT   imaging was performed from skull to thigh with multi-planar imaging without oral or intravenous   contrast material, using a dedicated integrated PET/CT scanner.       RADIONUCLIDE:     9.32 MCI   F18 FDG- I.V.  LABS:                          Blood Glucose 122 mg/dl  UPTAKE TIME:        49 mins         MEDIASTINAL BACKGROUND UPTAKE:  2.1, background liver parenchyma max SUV 2.7.     FINDINGS:          HEAD/NECK:    No suspicious FDG avid mass.  Very small mildly hypermetabolic left cervical lymph nodes   for example 7 mm short axis level 1 B node image 40 series 202 max SUV 1.9 and 0.6 cm short axis   left level 2 lymph node max SUV 3.2.  No suspicious adenopathy.  Carotid atherosclerotic disease   with partial retropharyngeal course.  LUNGS:             No suspicious FDG avid mass.  Left pleural based calcifications with associated thickening   and probable small nodular area of round atelectasis image 112 series 202 measuring 0.9 x 1.7 cm.    This could represent sequelae of prior infectious/inflammatory process versus prior trauma or   surgical changes.  MEDIASTINUM/MERRICK:    Mild cardiomegaly.  Aortic atherosclerotic disease without aneurysm.  Moderate to   large sliding hiatal hernia.  No suspicious FDG avid mass.  Right-sided chest port terminates   within mid SVC.  CHEST WALL/AXILLA:  No suspicious FDG avid mass.  No suspicious FDG avid adenopathy.  Linear   calcifications in the left breast.  ABDOMEN:       No suspicious FDG avid mass.  Physiologic uptake in the renal collecting system and GI   tract.  Multiple duodenal diverticula.  Fatty infiltration and atrophy of the pancreas.  Aortic   atherosclerotic disease.  Colonic diverticulosis.  PELVIS:             Multilocular cystic lesion in the right adnexa measuring up to 5.5 x 4.6 cm without   suspicious FDG activity this is incompletely  evaluated on this noncontrast PET-CT.  BONES:             No FDG avid destructive bone lesion.  Old healed left pubic ramus fractures.  Healed left   iliac wing deformity.  Multiple contiguous nondisplaced right-sided rib fractures, sclerosis and   callus formation suggest at most a subacute to chronic process.  Mild FDG activity associated with   the right anterior 8th rib max SUV 3.5.  Degenerative related uptake in the bilateral glenohumeral   joints.  Multilevel lumbar spondylosis noted.  OTHER:             Negative.       IMPRESSION:                 1. No suspicious FDG avid mass or adenopathy to suggest metastatic disease.  2. Small mildly hypermetabolic left level 1B and 2 cervical lymph nodes, almost certainly   benign/reactive.  3. Multiple contiguous nondisplaced right-sided rib fractures, likely subacute to chronic in   etiology.  Mild FDG activity within the right anterior 8th rib without underlying bone lesion,   possibly a more recent nondisplaced fracture.  Recommend correlation with point tenderness.          We discussed options including skin punch biopsy vs repeat imaging and she would like to pursue the repeat imaging as she feels that her breast is unchanged over the last 2 or so years.     Her most recent imaging is shown below:      Study Result    Narrative & Impression   MAMMO DIAGNOSTIC DIGITAL TOMOSYNTHESIS BILATERAL W CAD-     Date of Exam: 6/20/2024 10:50 AM     Indication: Diagnostic mammogram Breast Skin Changes; R23.4-Changes in  skin texture; Z03.89-Encounter for observation for other suspected  diseases and conditions ruled out     Comparison: Priors back to 5/14/2018     Technique: Diagnostic mammographic images of each breast were obtained  in the following views: Lateral and craniocaudal tomosynthesis was  utilized.          FINDINGS:     Images were obtained seated, since the patient was unable to stand.  Posterior breast tissue was not completely included.     Posttreatment  changes are again seen on the left. The patient reportedly  has lost significant weight since the previous study. The breast appears  slightly smaller. Diffuse skin thickening is nonspecific.     Port device is seen on the right.     No suspicious mass, architectural distortion, or suspicious  microcalcifications are seen in either breast.     These mammographic images were interpreted with the assistance of a  computer aided detection system.     The patient's information is entered into a computerized reminder system  with a targeted due date for the next mammogram.        IMPRESSION:   IMPRESSION:  Bilateral diagnostic mammogram demonstrating posttreatment changes on  the left. Diffuse skin thickening is nonspecific.     Stable right breast.           TISSUE DENSITY: The breasts are almost entirely fatty.     BI-RADS ASSESSMENT: BI-RADS 2. Benign findings.        Her breast exam has not changed.  She has a calcified lesion just under scar but does not want to have it excised at this time when this was offered.  This was the same as her last exam and remains unchanged.  She would like to take her followup out to one year and based on no changes on imaging this is likely not an issue.  Objective     Past Medical History:   Diagnosis Date   • AMD (age related macular degeneration)    • Anemia Following surgery   • Arthritis    • Asthma    • Breast cancer Left breast.   • Breast mass 2 lumpectomies 1 cancerous 1 benign   • CHF (congestive heart failure) Just getting evaluation   • Colon polyp Removed during colonos   • COPD (chronic obstructive pulmonary disease)    • Coronary artery disease Chf   • Deep vein thrombosis 1979   • Fibrocystic breast Left breast   • Fibromyalgia 01/18/2024   • History of transfusion Aug 2019 2 units   • Hyperlipidemia    • Hypertension    • Nausea and vomiting 11/29/2023   • Obesity    • Polymyalgia arteritica    • Postoperative wound infection Lymph node resection site,5th toe left  foot infection after neuroma surgery. 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   • Sciatica        Past Surgical History:   Procedure Laterality Date   • BREAST BIOPSY  Left   • BREAST SURGERY  Lumpectomies left breast   • ENDOSCOPY N/A 12/2/2023    Procedure: ESOPHAGOGASTRODUODENOSCOPY WITH BIOPSIES;  Surgeon: Maximino Aceves MD;  Location: [facility] ENDOSCOPY;  Service: Gastroenterology;  Laterality: N/A;  ESOPHAGITIS, GASTRITIS, HIATAL HERNIA   • ENDOSCOPY N/A 5/7/2024    Procedure: ESOPHAGOGASTRODUODENOSCOPY WITH BIOPSIES;  Surgeon: Christopher Henderson MD;  Location: [facility] ENDOSCOPY;  Service: Gastroenterology;  Laterality: N/A;  HIATAL HERNIA   • EYE SURGERY  Lens implants both eyes   • REPLACEMENT TOTAL KNEE     • TONSILLECTOMY           Current Outpatient Medications:   •  acetaminophen (Tylenol) 325 MG tablet, Take 2 tablets by mouth Every 6 (Six) Hours As Needed for Mild Pain., Disp: , Rfl:   •  amitriptyline (ELAVIL) 50 MG tablet, Daily., Disp: , Rfl:   •  atorvastatin (LIPITOR) 20 MG tablet, Take 1 tablet by mouth Daily., Disp: , Rfl:   •  benzonatate (TESSALON) 100 MG capsule, 1 capsule Every 8 (Eight) Hours., Disp: , Rfl:   •  Budeson-Glycopyrrol-Formoterol (Breztri Aerosphere) 160-9-4.8 MCG/ACT aerosol inhaler, Every 12 (Twelve) Hours., Disp: , Rfl:   •  DULoxetine (CYMBALTA) 30 MG capsule, Take 2 capsules by mouth Daily., Disp: , Rfl:   •  DULoxetine (CYMBALTA) 60 MG capsule, Take 1 capsule by mouth Daily., Disp: , Rfl:   •  furosemide (LASIX) 40 MG tablet, Every 12 (Twelve) Hours., Disp: , Rfl:   •  gabapentin (NEURONTIN) 300 MG capsule, 1 capsule Every 8 (Eight) Hours., Disp: , Rfl:   •  HYDROcodone-acetaminophen (NORCO) 7.5-325 MG per tablet, Take 1 tablet by mouth Every 4 (Four) Hours As Needed for Moderate Pain., Disp: , Rfl:   •  hydrocortisone 2.5 % cream, apply topically 3 times a day for 14 days, Disp: , Rfl:   •  ipratropium (ATROVENT) 0.03 % nasal spray, Every 8 (Eight) Hours., Disp: , Rfl:   •  lactulose  (CHRONULAC) 10 GM/15ML solution, take 15 ML BY MOUTH FOUR TIMES DAILY, Disp: , Rfl:   •  levothyroxine (SYNTHROID, LEVOTHROID) 75 MCG tablet, Take 1 tablet by mouth Daily., Disp: , Rfl:   •  methocarbamol (ROBAXIN) 500 MG tablet, Every 12 (Twelve) Hours., Disp: , Rfl:   •  methocarbamol (ROBAXIN) 750 MG tablet, TAKE ONE TABLET BY MOUTH TWICE DAILY for 28 days, Disp: , Rfl:   •  miconazole (MICOTIN) 2 % cream, 1 Application., Disp: , Rfl:   •  montelukast (SINGULAIR) 10 MG tablet, Take 1 tablet by mouth Every Night., Disp: , Rfl:   •  omeprazole (priLOSEC) 40 MG capsule, Daily., Disp: , Rfl:   •  ondansetron (ZOFRAN) 4 MG tablet, Take 1 tablet by mouth Every 8 (Eight) Hours., Disp: , Rfl:   •  pantoprazole (PROTONIX) 40 MG EC tablet, Take 1 tablet by mouth Daily., Disp: , Rfl:   •  potassium chloride (Klor-Con M10) 10 MEQ CR tablet, Every 12 (Twelve) Hours., Disp: , Rfl:   •  predniSONE (DELTASONE) 20 MG tablet, TAKE ONE TABLET BY MOUTH EVERY DAY for 7 days, Disp: , Rfl:   •  vitamin D (ERGOCALCIFEROL) 1.25 MG (10351 UT) capsule capsule, Take 1 capsule by mouth 2 (Two) Times a Week. Monday and Wednesday, Disp: , Rfl:   •  spironolactone (ALDACTONE) 25 MG tablet, Take 1 tablet by mouth Daily for 30 days., Disp: 30 tablet, Rfl: 0    Allergies   Allergen Reactions   • Morphine Anaphylaxis        Family History   Problem Relation Age of Onset   • Arthritis Mother    • Cancer Mother    • Heart disease Mother    • Arthritis Father    • COPD Father    • Arthritis Brother    • Diabetes Brother    • Learning disabilities Brother         Dyslexic   • Mental illness Maternal Aunt         Schizophrenic       Social History     Socioeconomic History   • Marital status: Single   Tobacco Use   • Smoking status: Never   • Smokeless tobacco: Never   Vaping Use   • Vaping status: Never Used   Substance and Sexual Activity   • Alcohol use: Never   • Drug use: Never   • Sexual activity: Defer     Partners: Male     Birth  "control/protection: Diaphragm, Abstinence       Vital Signs:   Resp 16   Ht 168.9 cm (66.5\")   Wt 90.7 kg (200 lb)   BMI 31.80 kg/m²    Review of Systems    Physical Exam  Vitals and nursing note reviewed.   Constitutional:       Appearance: Normal appearance.   HENT:      Head: Normocephalic and atraumatic.   Eyes:      Extraocular Movements: Extraocular movements intact.      Pupils: Pupils are equal, round, and reactive to light.   Cardiovascular:      Pulses: Normal pulses.   Pulmonary:      Effort: Pulmonary effort is normal. No accessory muscle usage or respiratory distress.   Chest:   Breasts:     Right: Normal. No inverted nipple, nipple discharge or skin change.      Left: Swelling and skin change present. No inverted nipple, mass or nipple discharge.      Comments: The left breast is tense and swollen and grossly abnormal appearing when compared to the right.    Abdominal:      General: Abdomen is flat.      Palpations: Abdomen is soft.      Tenderness: There is no abdominal tenderness. There is no guarding.   Musculoskeletal:         General: No swelling, tenderness or deformity.      Cervical back: Neck supple.   Lymphadenopathy:      Upper Body:      Right upper body: No supraclavicular or axillary adenopathy.      Left upper body: No supraclavicular or axillary adenopathy.   Skin:     General: Skin is warm and dry.   Neurological:      General: No focal deficit present.      Mental Status: She is alert and oriented to person, place, and time.   Psychiatric:         Mood and Affect: Mood normal.         Thought Content: Thought content normal.      Linear calcified lesion under scar unchanged from last exam  Result Review :               Assessment and Plan    Diagnoses and all orders for this visit:    1. Breast skin changes (Primary)    Followup 1 year for exam  Continue screening imaging     Follow Up   Return in about 1 year (around 6/24/2025).  Patient was given instructions and counseling " regarding her condition or for health maintenance advice. Please see specific information pulled into the AVS if appropriate.         This document has been electronically signed by Lara Terry MD  June 24, 2024 17:37 EDT

## 2024-07-02 ENCOUNTER — TELEPHONE (OUTPATIENT)
Dept: GENERAL RADIOLOGY | Facility: HOSPITAL | Age: 74
End: 2024-07-02
Payer: MEDICARE

## 2024-07-02 NOTE — TELEPHONE ENCOUNTER
Patient mammography result letter returned for reason unclaimed, unable to forward. Per the patient, she repeatedly has issues receiving mail. She stated she is aware of the results.

## 2025-06-04 ENCOUNTER — TELEPHONE (OUTPATIENT)
Dept: SURGERY | Facility: CLINIC | Age: 75
End: 2025-06-04
Payer: MEDICARE

## 2025-06-04 NOTE — TELEPHONE ENCOUNTER
PATIENT CALLED.  PREVIOUS DR. NO, BUT WILL BE SEEING APRIL 06/25/25 FOR ANNUAL EXAM.    SHE SAID WE WERE SUPPOSED TO SCHEDULE A MAMMOGRAM FOR HER TO HAVE PRIOR.    THERE IS NO ORDER FOR IT.    CB#[phone]

## 2025-06-09 DIAGNOSIS — Z12.31 ENCOUNTER FOR SCREENING MAMMOGRAM FOR MALIGNANT NEOPLASM OF BREAST: Primary | ICD-10-CM

## 2025-06-09 NOTE — TELEPHONE ENCOUNTER
Patient is aware that her mammogram is scheduled on 6/23/25 at [facility] with a 6:15 pm arrival and to keep follow up with Enid Coronado to go over results. She V/U.

## 2025-06-23 ENCOUNTER — HOSPITAL ENCOUNTER (OUTPATIENT)
Dept: MAMMOGRAPHY | Facility: HOSPITAL | Age: 75
Discharge: HOME | End: 2025-06-23
Admitting: NURSE PRACTITIONER
Payer: MEDICARE

## 2025-06-23 DIAGNOSIS — Z12.31 ENCOUNTER FOR SCREENING MAMMOGRAM FOR MALIGNANT NEOPLASM OF BREAST: ICD-10-CM

## 2025-06-23 PROCEDURE — 77063 BREAST TOMOSYNTHESIS BI: CPT

## 2025-06-23 PROCEDURE — 77067 SCR MAMMO BI INCL CAD: CPT

## 2025-06-25 ENCOUNTER — OFFICE VISIT (OUTPATIENT)
Dept: SURGERY | Facility: CLINIC | Age: 75
End: 2025-06-25
Payer: MEDICARE

## 2025-06-25 VITALS — BODY MASS INDEX: 37.02 KG/M2 | HEIGHT: 67 IN | WEIGHT: 235.89 LBS

## 2025-06-25 DIAGNOSIS — Z85.3 HISTORY OF BREAST CANCER: ICD-10-CM

## 2025-06-25 DIAGNOSIS — Z12.31 ENCOUNTER FOR SCREENING MAMMOGRAM FOR MALIGNANT NEOPLASM OF BREAST: Primary | ICD-10-CM

## 2025-06-25 RX ORDER — MELOXICAM 15 MG/1
15 TABLET ORAL DAILY
COMMUNITY

## 2025-06-25 NOTE — PROGRESS NOTES
Chief Complaint: Follow-up    Subjective      Annual exam       History of Present Illness  Lona Rodríguez is a 75 y.o. female presents to [facility] for annual exam and to discuss results of her mammogram.    Patient presents today for follow-up visit, annual exam and to discuss the results of her mammogram.  Patient relates breast skin changes and enlargement of her left breast.  She does have a history of breast cancer in 1996 of the left breast.  Patient no longer has menstrual cycles.  Denies any family history of breast cancer.      6/25: Study Result    Narrative & Impression   MAMMO SCREENING DIGITAL TOMOSYNTHESIS BILATERAL W CAD-     Date of Exam: 6/23/2025 5:13 PM     Indication: Screening. Personal history of left breast cancer 1996  postlumpectomy and radiation     Comparison: Numerous mammograms between June 20, 2024 and May 14, 2018     Technique: Routine screening mammogram images were obtained per  protocol.  Tomosynthesis was utilized.  These mammographic images were  interpreted with the assistance of a computer aided detection system.     Breast Density: The breasts are heterogeneously dense, which may obscure  small masses.     Findings:   Per the technologist, best images were obtained given level of patient  cooperation, tolerance, mobility and/or body habitus. No suspicious  masses, suspicious microcalcifications, or architectural distortions are  identified.     IMPRESSION:  No mammographic evidence of malignancy.  Recommend annual screening  mammography.     BI-RADS ASSESSMENT: Category 2: Benign     Note: It has been reported that there is approximately a 15% false  negative rate in mammography.  Therefore, management of a palpable  abnormality should not be deferred because of a negative mammogram.     6/24/2025 10:09 AM by Ba Bishop     Objective     Past Medical History:   Diagnosis Date    AMD (age related macular degeneration)     Anemia  Following surgery    Arthritis     Asthma     Breast cancer Left breast.    Breast mass 2 lumpectomies 1 cancerous 1 benign    CHF (congestive heart failure) Just getting evaluation    Colon polyp Removed during colonos    COPD (chronic obstructive pulmonary disease)     Coronary artery disease Chf    Deep vein thrombosis 1979    Fibrocystic breast Left breast    Fibromyalgia 01/18/2024    History of transfusion Aug 2019 2 units    Hyperlipidemia     Hypertension     Nausea and vomiting 11/29/2023    Obesity     Ovarian cyst     Polymyalgia arteritica     Postoperative wound infection Lymph node resection site,5th toe left foot infection after neuroma surgery. 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    Sciatica        Past Surgical History:   Procedure Laterality Date    BREAST BIOPSY  Left    BREAST SURGERY  Lumpectomies left breast    ENDOSCOPY N/A 12/2/2023    Procedure: ESOPHAGOGASTRODUODENOSCOPY WITH BIOPSIES;  Surgeon: Maximino Aceves MD;  Location: [facility] END[facility]COPY;  Service: Gastroenterology;  Laterality: N/A;  ESOPHAGITIS, GASTRITIS, HIATAL HERNIA    ENDOSCOPY N/A 5/7/2024    Procedure: ESOPHAGOGASTRODUODENOSCOPY WITH BIOPSIES;  Surgeon: Christopher Henderson MD;  Location: [facility] ENDOSCOPY;  Service: Gastroenterology;  Laterality: N/A;  HIATAL HERNIA    EYE SURGERY  Lens implants both eyes    REPLACEMENT TOTAL KNEE      TONSILLECTOMY         Outpatient Medications Marked as Taking for the 6/25/25 encounter (Office Visit) with Enid Coronado, APRN   Medication Sig Dispense Refill    acetaminophen (Tylenol) 325 MG tablet Take 2 tablets by mouth Every 6 (Six) Hours As Needed for Mild Pain.      amitriptyline (ELAVIL) 50 MG tablet Daily.      atorvastatin (LIPITOR) 20 MG tablet Take 1 tablet by mouth Daily.      Budeson-Glycopyrrol-Formoterol (Breztri Aerosphere) 160-9-4.8 MCG/ACT aerosol inhaler Every 12 (Twelve) Hours.      DULoxetine (CYMBALTA) 30 MG capsule Take 2 capsules by mouth Daily.      DULoxetine (CYMBALTA) 60 MG  capsule Take 1 capsule by mouth Daily.      furosemide (LASIX) 40 MG tablet Every 12 (Twelve) Hours.      gabapentin (NEURONTIN) 300 MG capsule 1 capsule Every 8 (Eight) Hours.      HYDROcodone-acetaminophen (NORCO) 7.5-325 MG per tablet Take 1 tablet by mouth Every 4 (Four) Hours As Needed for Moderate Pain.      hydrocortisone 2.5 % cream apply topically 3 times a day for 14 days      ipratropium (ATROVENT) 0.03 % nasal spray Every 8 (Eight) Hours.      lactulose (CHRONULAC) 10 GM/15ML solution take 15 ML BY MOUTH FOUR TIMES DAILY      levothyroxine (SYNTHROID, LEVOTHROID) 75 MCG tablet Take 1 tablet by mouth Daily.      meloxicam (MOBIC) 15 MG tablet Take 1 tablet by mouth Daily.      methocarbamol (ROBAXIN) 500 MG tablet Every 12 (Twelve) Hours.      methocarbamol (ROBAXIN) 750 MG tablet TAKE ONE TABLET BY MOUTH TWICE DAILY for 28 days      miconazole (MICOTIN) 2 % cream 1 Application.      montelukast (SINGULAIR) 10 MG tablet Take 1 tablet by mouth Every Night.      omeprazole (priLOSEC) 40 MG capsule Daily.      ondansetron (ZOFRAN) 4 MG tablet Take 1 tablet by mouth Every 8 (Eight) Hours.      pantoprazole (PROTONIX) 40 MG EC tablet Take 1 tablet by mouth Daily.      potassium chloride (Klor-Con M10) 10 MEQ CR tablet Every 12 (Twelve) Hours.      predniSONE (DELTASONE) 20 MG tablet TAKE ONE TABLET BY MOUTH EVERY DAY for 7 days      sertraline (ZOLOFT) 50 MG tablet Take 1 tablet by mouth Daily.      vitamin D (ERGOCALCIFEROL) 1.25 MG (47044 UT) capsule capsule Take 1 capsule by mouth 2 (Two) Times a Week. Monday and Wednesday         Allergies   Allergen Reactions    Morphine Anaphylaxis        Family History   Problem Relation Age of Onset    Arthritis Mother     Cancer Mother     Heart disease Mother     Arthritis Father     COPD Father     Arthritis Brother     Diabetes Brother     Learning disabilities Brother         Dyslexic    Mental illness Maternal Aunt         Schizophrenic       Social History  "    Socioeconomic History    Marital status: Single   Tobacco Use    Smoking status: Never    Smokeless tobacco: Never   Vaping Use    Vaping status: Never Used   Substance and Sexual Activity    Alcohol use: Never    Drug use: Never    Sexual activity: Defer     Partners: Male     Birth control/protection: Diaphragm, Abstinence       Review of Systems   Constitutional:  Negative for chills and fever.   Genitourinary:  Positive for amenorrhea. Negative for breast discharge, breast lump and breast pain.        Vital Signs:   Ht 168.9 cm (66.5\")   Wt 107 kg (235 lb 14.3 oz)   BMI 37.50 kg/m²      Physical Exam  Vitals and nursing note reviewed.   Constitutional:       General: She is not in acute distress.     Appearance: She is obese. She is not ill-appearing.   HENT:      Head: Normocephalic and atraumatic.   Cardiovascular:      Rate and Rhythm: Normal rate.   Pulmonary:      Effort: Pulmonary effort is normal.      Breath sounds: No stridor.   Abdominal:      Palpations: Abdomen is soft.      Tenderness: There is no guarding.   Musculoskeletal:         General: No deformity. Normal range of motion.   Skin:     General: Skin is warm and dry.      Comments: Right breast: On inspection, there are no skin changes to suggest underlying malignancy.  On palpitation, there are no dominant masses noted.    Left breast: On inspection, there are no skin changes to suggest an underlying malignancy.  There is some pigmentation issues of the left breast.  On palpitation, there are no dominant masses noted.  In the old lumpectomy bed there is a area of induration noted.  Patient reports it has been like this since surgery.    Axilla (bilateral): No Lymphadenopathy appreciated.   Neurological:      General: No focal deficit present.      Mental Status: She is alert and oriented to person, place, and time.   Psychiatric:         Mood and Affect: Mood normal.         Thought Content: Thought content normal.          Result Review " :          []  Laboratory  []  Radiology  []  Pathology  []  Microbiology  []  EKG/Telemetry   []  Cardiology/Vascular   []  Old records  I spent 30 minutes caring for Lona on this date of service. This time includes time spent by me in the following activities: reviewing tests, obtaining and/or reviewing a separately obtained history, performing a medically appropriate examination and/or evaluation, ordering medications, tests, or procedures, and documenting information in the medical record        Assessment and Plan    Diagnoses and all orders for this visit:    1. Encounter for screening mammogram for malignant neoplasm of breast (Primary)  -     Mammo Screening Digital Tomosynthesis Bilateral With CAD; Future    2. History of breast cancer  -     Mammo Screening Digital Tomosynthesis Bilateral With CAD; Future        Follow Up   Return for Follow-up in 1 year for annual exam; mammogram prior to the visit..    Begin by lying on your back. It is easier to examine all breast tissue if you are lying down.    Place your right hand behind your head. With the middle fingers of your left hand, gently yet firmly press down using small motions to examine the entire right breast.  Next, sit or stand. Feel your armpit, because breast tissue goes into that area.  Gently squeeze the nipple, checking for discharge. Repeat the process on the left breast.  Use one of the patterns shown in the diagram to make sure that you are covering all of the breast tissue.  Breast self-exam           Patient was given instructions and counseling regarding her condition or for health maintenance advice. Please see specific information pulled into the AVS if appropriate.     As always, it has been a pleasure to participate in your patient's care. Please call with questions or concerns.

## 2025-08-19 ENCOUNTER — OFFICE VISIT (OUTPATIENT)
Dept: ORTHOPEDIC SURGERY | Facility: CLINIC | Age: 75
End: 2025-08-19
Payer: MEDICARE

## 2025-08-19 VITALS
DIASTOLIC BLOOD PRESSURE: 80 MMHG | SYSTOLIC BLOOD PRESSURE: 152 MMHG | OXYGEN SATURATION: 95 % | HEIGHT: 67 IN | BODY MASS INDEX: 37.5 KG/M2 | HEART RATE: 91 BPM

## 2025-08-19 DIAGNOSIS — M25.561 RIGHT KNEE PAIN, UNSPECIFIED CHRONICITY: Primary | ICD-10-CM

## 2025-08-19 DIAGNOSIS — M17.11 PRIMARY OSTEOARTHRITIS OF RIGHT KNEE: ICD-10-CM

## 2025-08-19 RX ADMIN — LIDOCAINE HYDROCHLORIDE 5 ML: 10 INJECTION, SOLUTION INFILTRATION; PERINEURAL at 13:24

## 2025-08-19 RX ADMIN — TRIAMCINOLONE ACETONIDE 40 MG: 40 INJECTION, SUSPENSION INTRA-ARTICULAR; INTRAMUSCULAR at 13:24

## 2025-08-20 RX ORDER — TRIAMCINOLONE ACETONIDE 40 MG/ML
40 INJECTION, SUSPENSION INTRA-ARTICULAR; INTRAMUSCULAR
Status: COMPLETED | OUTPATIENT
Start: 2025-08-19 | End: 2025-08-19

## 2025-08-20 RX ORDER — LIDOCAINE HYDROCHLORIDE 10 MG/ML
5 INJECTION, SOLUTION INFILTRATION; PERINEURAL
Status: COMPLETED | OUTPATIENT
Start: 2025-08-19 | End: 2025-08-19

## (undated) DEVICE — Device

## (undated) DEVICE — SOL IRRG H2O PL/BG 1000ML STRL

## (undated) DEVICE — SOLIDIFIER LIQLOC PLS 1500CC BT

## (undated) DEVICE — EGD OR ERCP KIT: Brand: MEDLINE INDUSTRIES, INC.

## (undated) DEVICE — Device: Brand: DEFENDO AIR/WATER/SUCTION AND BIOPSY VALVE

## (undated) DEVICE — SINGLE-USE BIOPSY FORCEPS: Brand: RADIAL JAW 4

## (undated) DEVICE — CONN JET HYDRA H20 AUXILIARY DISP

## (undated) DEVICE — LINER SURG CANSTR SXN S/RIGD 1500CC

## (undated) DEVICE — BLCK/BITE BLOX WO/DENTL/RIM W/STRAP 54F